# Patient Record
Sex: FEMALE | Race: WHITE | NOT HISPANIC OR LATINO | Employment: OTHER | URBAN - METROPOLITAN AREA
[De-identification: names, ages, dates, MRNs, and addresses within clinical notes are randomized per-mention and may not be internally consistent; named-entity substitution may affect disease eponyms.]

---

## 2024-02-03 NOTE — PROGRESS NOTES
Subjective   Sue Carter is a 73 y.o. female who presents for new patient. CAA-RI  HPI  73-year-old female with recommended patient.  Cerebral Amyloid angiopathy with related inflammation Denies  completed steroid therapy was seen at [facility]  Dr Mendez at [facility] ,concerned one episode of chest pain one episodes lasting one hour and gone,  no new episodes reported, patient walks daily up to 30 min without issues shortness of breath fever chill nausea vomiting constipation diarrhea dysuria urgency frequency. Patient Vitamin d calcium.     Mother angina 85 passed CVA  Father MI age 53 ,survived   age 78 Smoker   from Lung cancer  2 brothers 1 passed age 80 Darby disease pacemaker  Brother 79 alive MI stents     Review of Systems  10 system review pertinent as above  Objective     There were no vitals taken for this visit.   Physical Exam  HEENT: Atraumatic normocephalic the pupils are equal and round and reactive to light the sclerae nonicteric extraocular motion are intact.  Neck: Is supple without JVD no carotid bruits the trachea is midline there are no masses pulses are equal and bilateral with normal upstroke.  Skin: Normal.  Skin good texture.  Moist.  Good turgor.  No lesions, no rashes.  Lymph: No lymphadenopathy appreciated, no masses, no lesions  Lungs: Are clear to auscultation and percussion, good breath sounds bilaterally, no rhonchi, no wheezing, good diaphragmatic excursion.  Heart: Normal rate and normal rhythm S1, S2, no S3, no gallop, murmur or rub.  Abdomen: Soft, nontender, no organomegaly, good bowel sounds.    Extremities: Full range of motion, good pulses bilateral.  No cyanosis, no clubbing or edema.  Neuro: Cranial nerves II-XII are grossly intact there is no sensory or motor deficits.  Able to move all extremities.      Assessment/Plan     New to me patient    Significant medical risks include but not limited to blood work and radiology report  Pressure reviewed extensive notes from the  [facility]  Consultation recommendations    Cerebral amyloid angiopathy  With related inflammation  Diagnosed incidentally in 2020 December  Follows with Dr. Mendez [facility]  Completed steroids  Patient is instructed to follow regularly    History of  Fasting blood work CBC BMP lipids AST ALT evaluate 25-hydroxy    Prevention  Colonoscopy 05/05/2022  Mammogram 08/24/2022  Bone density 2021[facility]    EKG 2019 NSR Nl EKG    CACS score ordered  In setting of Fhx CAD  Father age 53 Se Fhx above    Cholesterol 2022 October 173, LDL 97    Immunizations  Flu vaccine 12/2023  Pneumonia vaccine 05/2016   Shingles vaccine declined  Corona vaccine 2/2 and 3 boosters  RSV declined      Hx DVT 2020  Yoana filter  Will follow with [facility]  Problem List Items Addressed This Visit       Autoimmune disorder (CMS/HCC)    Relevant Orders    Basic Metabolic Panel    Cerebral amyloid angiopathy (CODE) - Primary    Relevant Orders    CBC     Other Visit Diagnoses       Dyslipidemia        Relevant Orders    Basic Metabolic Panel    Lipid Panel    AST    ALT    CT cardiac scoring wo IV contrast    Atypical chest pain        Relevant Orders    Basic Metabolic Panel    ECG 12 lead (Clinic Performed) (Completed)    Vitamin D insufficiency        Relevant Orders    Vitamin D 25-Hydroxy,Total (for eval of Vitamin D levels)    Family history of early CAD        Relevant Orders    CT cardiac scoring wo IV contrast    Encounter for screening mammogram for malignant neoplasm of breast        Relevant Orders    BI mammo bilateral screening tomosynthesis              Yuval Gonzalez MD

## 2024-02-07 ENCOUNTER — OFFICE VISIT (OUTPATIENT)
Dept: PRIMARY CARE | Facility: CLINIC | Age: 74
End: 2024-02-07
Payer: MEDICARE

## 2024-02-07 VITALS
BODY MASS INDEX: 24.11 KG/M2 | HEIGHT: 66 IN | SYSTOLIC BLOOD PRESSURE: 124 MMHG | TEMPERATURE: 97.5 F | DIASTOLIC BLOOD PRESSURE: 82 MMHG | RESPIRATION RATE: 16 BRPM | HEART RATE: 78 BPM | WEIGHT: 150 LBS

## 2024-02-07 DIAGNOSIS — E78.5 DYSLIPIDEMIA: ICD-10-CM

## 2024-02-07 DIAGNOSIS — I68.0 CEREBRAL AMYLOID ANGIOPATHY (CODE): Primary | ICD-10-CM

## 2024-02-07 DIAGNOSIS — Z12.31 ENCOUNTER FOR SCREENING MAMMOGRAM FOR MALIGNANT NEOPLASM OF BREAST: ICD-10-CM

## 2024-02-07 DIAGNOSIS — D89.89 AUTOIMMUNE DISORDER (MULTI): ICD-10-CM

## 2024-02-07 DIAGNOSIS — R07.89 ATYPICAL CHEST PAIN: ICD-10-CM

## 2024-02-07 DIAGNOSIS — Z82.49 FAMILY HISTORY OF EARLY CAD: ICD-10-CM

## 2024-02-07 DIAGNOSIS — E55.9 VITAMIN D INSUFFICIENCY: ICD-10-CM

## 2024-02-07 PROBLEM — M81.0 AGE-RELATED OSTEOPOROSIS WITHOUT CURRENT PATHOLOGICAL FRACTURE: Status: ACTIVE | Noted: 2021-10-29

## 2024-02-07 PROBLEM — H04.123 DRY EYE SYNDROME OF BOTH EYES: Status: ACTIVE | Noted: 2023-12-01

## 2024-02-07 PROBLEM — I82.402 ACUTE DEEP VEIN THROMBOSIS (DVT) OF LEFT LOWER EXTREMITY (MULTI): Status: ACTIVE | Noted: 2020-07-17

## 2024-02-07 PROBLEM — H35.3130 BILATERAL DRY AGE-RELATED MACULAR DEGENERATION: Status: ACTIVE | Noted: 2023-11-29

## 2024-02-07 PROBLEM — H35.361 MACULAR DRUSEN, RIGHT: Status: ACTIVE | Noted: 2023-11-29

## 2024-02-07 PROCEDURE — 82306 VITAMIN D 25 HYDROXY: CPT | Performed by: INTERNAL MEDICINE

## 2024-02-07 PROCEDURE — 80048 BASIC METABOLIC PNL TOTAL CA: CPT | Performed by: INTERNAL MEDICINE

## 2024-02-07 PROCEDURE — 1159F MED LIST DOCD IN RCRD: CPT | Performed by: INTERNAL MEDICINE

## 2024-02-07 PROCEDURE — 85025 COMPLETE CBC W/AUTO DIFF WBC: CPT | Performed by: INTERNAL MEDICINE

## 2024-02-07 PROCEDURE — 84450 TRANSFERASE (AST) (SGOT): CPT | Performed by: INTERNAL MEDICINE

## 2024-02-07 PROCEDURE — 84460 ALANINE AMINO (ALT) (SGPT): CPT | Performed by: INTERNAL MEDICINE

## 2024-02-07 PROCEDURE — 80061 LIPID PANEL: CPT | Performed by: INTERNAL MEDICINE

## 2024-02-07 PROCEDURE — 1126F AMNT PAIN NOTED NONE PRSNT: CPT | Performed by: INTERNAL MEDICINE

## 2024-02-07 PROCEDURE — 93000 ELECTROCARDIOGRAM COMPLETE: CPT | Performed by: INTERNAL MEDICINE

## 2024-02-07 PROCEDURE — 1036F TOBACCO NON-USER: CPT | Performed by: INTERNAL MEDICINE

## 2024-02-07 PROCEDURE — 99215 OFFICE O/P EST HI 40 MIN: CPT | Performed by: INTERNAL MEDICINE

## 2024-02-07 ASSESSMENT — PAIN SCALES - GENERAL: PAINLEVEL: 0-NO PAIN

## 2024-02-07 ASSESSMENT — ENCOUNTER SYMPTOMS
OCCASIONAL FEELINGS OF UNSTEADINESS: 0
DEPRESSION: 0
LOSS OF SENSATION IN FEET: 0

## 2024-02-08 ENCOUNTER — HOSPITAL ENCOUNTER (OUTPATIENT)
Dept: RADIOLOGY | Facility: CLINIC | Age: 74
Discharge: HOME | End: 2024-02-08
Payer: MEDICARE

## 2024-02-08 DIAGNOSIS — Z12.31 ENCOUNTER FOR SCREENING MAMMOGRAM FOR MALIGNANT NEOPLASM OF BREAST: ICD-10-CM

## 2024-02-08 PROCEDURE — 77063 BREAST TOMOSYNTHESIS BI: CPT | Performed by: STUDENT IN AN ORGANIZED HEALTH CARE EDUCATION/TRAINING PROGRAM

## 2024-02-08 PROCEDURE — 77067 SCR MAMMO BI INCL CAD: CPT | Performed by: STUDENT IN AN ORGANIZED HEALTH CARE EDUCATION/TRAINING PROGRAM

## 2024-02-08 PROCEDURE — 77067 SCR MAMMO BI INCL CAD: CPT

## 2024-02-09 ENCOUNTER — HOSPITAL ENCOUNTER (OUTPATIENT)
Dept: RADIOLOGY | Facility: EXTERNAL LOCATION | Age: 74
Discharge: HOME | End: 2024-02-09

## 2024-03-29 ENCOUNTER — HOSPITAL ENCOUNTER (OUTPATIENT)
Dept: RADIOLOGY | Facility: HOSPITAL | Age: 74
Discharge: HOME | End: 2024-03-29
Payer: MEDICARE

## 2024-03-29 DIAGNOSIS — Z82.49 FAMILY HISTORY OF EARLY CAD: ICD-10-CM

## 2024-03-29 DIAGNOSIS — E78.5 DYSLIPIDEMIA: ICD-10-CM

## 2024-03-29 PROCEDURE — 75571 CT HRT W/O DYE W/CA TEST: CPT

## 2024-05-02 NOTE — PROGRESS NOTES
Subjective   uSe Carter is a 73 y.o. female who presents for here for follow-up.  hospitals  73-year-old female with recommended patient.  Cerebral Amyloid angiopathy with related inflammation   completed steroid therapy follows at [facility]  Dr Mendez at [facility] ,concerned one episode of chest pain one episodes lasting one hour and gone,  no new episodes reported, patient is active with no major medical denies chest pain shortness of breath fever chill nausea vomiting constipation diarrhea dysuria urgency frequency.  Patient was seen for physical exam and blood work in February 7, 2024      Review of Systems  10 system review pertinent as above  Objective     Visit Vitals  /82   Pulse 78   Temp 36.4 °C (97.5 °F)   Resp 15      Physical Exam  HEENT: Atraumatic normocephalic the pupils are equal and round and reactive to light the sclerae nonicteric extraocular motion are intact.  Neck: Is supple without JVD no carotid bruits the trachea is midline there are no masses pulses are equal and bilateral with normal upstroke.  Skin: Normal.  Skin good texture.  Moist.  Good turgor.  No lesions, no rashes.  Lymph: No lymphadenopathy appreciated, no masses, no lesions  Lungs: Are clear to auscultation and percussion, good breath sounds bilaterally, no rhonchi, no wheezing, good diaphragmatic excursion.  Heart: Normal rate and normal rhythm S1, S2, no S3, no gallop, murmur or rub.  Abdomen: Soft, nontender, no organomegaly, good bowel sounds.    Extremities: Full range of motion, good pulses bilateral.  No cyanosis, no clubbing or edema.  Neuro: Cranial nerves II-XII are grossly intact there is no sensory or motor deficits.  Able to move all extremities.      Assessment/Plan         Last blood works February 7, 2024    History of cerebral amyloid angiopathy  With related inflammation  Diagnosed incidentally in 2020 December  Follows with Dr. Mendez [facility]  Completed steroids SP repeat MRI 05/05/2024  Will review at  with   Andrea  Patient is instructed to follow regularly    History of  Fasting blood work BMP    Prevention  Colonoscopy 05/05/2022  Mammogram 02/07/2024  Bone density 2021[facility]    EKG 2019 NSR Nl EKG    CACS score =47 LAD low risk LDL 90 Mg/Dl 02/07/24  In setting of Fhx CAD  Father age 53 Se Fhx above    Cholesterol 2022 October 173, LDL 97    Immunizations  Flu vaccine 12/2023  Pneumonia vaccine 05/2016   Shingles vaccine declined  Corona vaccine 2/2 and 3 boosters  RSV declined      Hx DVT 2020  Yoana filter  Was no removed  Will follow with [facility] Dr GÓMEZ Mccoy  Problem List Items Addressed This Visit    None          Yuval Gonzalez MD

## 2024-05-08 ENCOUNTER — OFFICE VISIT (OUTPATIENT)
Dept: PRIMARY CARE | Facility: CLINIC | Age: 74
End: 2024-05-08
Payer: MEDICARE

## 2024-05-08 VITALS
SYSTOLIC BLOOD PRESSURE: 126 MMHG | TEMPERATURE: 97.5 F | RESPIRATION RATE: 15 BRPM | WEIGHT: 150 LBS | HEIGHT: 66 IN | BODY MASS INDEX: 24.11 KG/M2 | DIASTOLIC BLOOD PRESSURE: 82 MMHG | HEART RATE: 78 BPM

## 2024-05-08 DIAGNOSIS — I68.0 CEREBRAL AMYLOID ANGIOPATHY (CODE): ICD-10-CM

## 2024-05-08 DIAGNOSIS — I82.4Y2 ACUTE DEEP VEIN THROMBOSIS (DVT) OF PROXIMAL VEIN OF LEFT LOWER EXTREMITY (MULTI): Primary | ICD-10-CM

## 2024-05-08 DIAGNOSIS — M81.0 AGE-RELATED OSTEOPOROSIS WITHOUT CURRENT PATHOLOGICAL FRACTURE: ICD-10-CM

## 2024-05-08 DIAGNOSIS — H04.123 DRY EYE SYNDROME OF BOTH EYES: ICD-10-CM

## 2024-05-08 PROCEDURE — 1036F TOBACCO NON-USER: CPT | Performed by: INTERNAL MEDICINE

## 2024-05-08 PROCEDURE — 1170F FXNL STATUS ASSESSED: CPT | Performed by: INTERNAL MEDICINE

## 2024-05-08 PROCEDURE — 1159F MED LIST DOCD IN RCRD: CPT | Performed by: INTERNAL MEDICINE

## 2024-05-08 PROCEDURE — 99214 OFFICE O/P EST MOD 30 MIN: CPT | Performed by: INTERNAL MEDICINE

## 2024-05-08 PROCEDURE — G0438 PPPS, INITIAL VISIT: HCPCS | Performed by: INTERNAL MEDICINE

## 2024-05-08 PROCEDURE — 1126F AMNT PAIN NOTED NONE PRSNT: CPT | Performed by: INTERNAL MEDICINE

## 2024-05-08 ASSESSMENT — ACTIVITIES OF DAILY LIVING (ADL)
JUDGMENT_ADEQUATE_SAFELY_COMPLETE_DAILY_ACTIVITIES: YES
USING TELEPHONE: INDEPENDENT
HEARING - RIGHT EAR: FUNCTIONAL
HEARING - LEFT EAR: FUNCTIONAL
DOING HOUSEWORK: INDEPENDENT
EATING: INDEPENDENT
NEEDS ASSISTANCE WITH FOOD: INDEPENDENT
PILL BOX USED: NO
WALKS IN HOME: INDEPENDENT
DRESSING YOURSELF: INDEPENDENT
FEEDING YOURSELF: INDEPENDENT
PREPARING MEALS: INDEPENDENT
TOILETING: INDEPENDENT
MANAGING FINANCES: INDEPENDENT
BATHING: INDEPENDENT
TAKING MEDICATION: INDEPENDENT
USING TRANSPORTATION: INDEPENDENT
ADEQUATE_TO_COMPLETE_ADL: YES
GROCERY SHOPPING: INDEPENDENT
PATIENT'S MEMORY ADEQUATE TO SAFELY COMPLETE DAILY ACTIVITIES?: YES
GROOMING: INDEPENDENT

## 2024-05-08 ASSESSMENT — ANXIETY QUESTIONNAIRES
4. TROUBLE RELAXING: SEVERAL DAYS
3. WORRYING TOO MUCH ABOUT DIFFERENT THINGS: NOT AT ALL
2. NOT BEING ABLE TO STOP OR CONTROL WORRYING: SEVERAL DAYS
1. FEELING NERVOUS, ANXIOUS, OR ON EDGE: NOT AT ALL
6. BECOMING EASILY ANNOYED OR IRRITABLE: NOT AT ALL
IF YOU CHECKED OFF ANY PROBLEMS ON THIS QUESTIONNAIRE, HOW DIFFICULT HAVE THESE PROBLEMS MADE IT FOR YOU TO DO YOUR WORK, TAKE CARE OF THINGS AT HOME, OR GET ALONG WITH OTHER PEOPLE: NOT DIFFICULT AT ALL
7. FEELING AFRAID AS IF SOMETHING AWFUL MIGHT HAPPEN: NOT AT ALL

## 2024-05-08 ASSESSMENT — COLUMBIA-SUICIDE SEVERITY RATING SCALE - C-SSRS
6. HAVE YOU EVER DONE ANYTHING, STARTED TO DO ANYTHING, OR PREPARED TO DO ANYTHING TO END YOUR LIFE?: NO
2. HAVE YOU ACTUALLY HAD ANY THOUGHTS OF KILLING YOURSELF?: NO
1. IN THE PAST MONTH, HAVE YOU WISHED YOU WERE DEAD OR WISHED YOU COULD GO TO SLEEP AND NOT WAKE UP?: NO

## 2024-05-08 ASSESSMENT — GERIATRIC MINI NUTRITIONAL ASSESSMENT (MNA)
A HAS FOOD INTAKE DECLINED OVER THE PAST 3 MONTHS DUE TO LOSS OF APPETITE, DIGESTIVE PROBLEMS, CHEWING OR SWALLOWING DIFFICULTIES?: NO DECREASE IN FOOD INTAKE
C GENERAL MOBILITY: GOES OUT
B WEIGHT LOSS DURING THE LAST 3 MONTHS: NO WEIGHT LOSS
E NEUROPSYCHOLOGICAL PROBLEMS: NO PSYCHOLOGICAL PROBLEMS
D HAS SUFFERED PSYCHOLOGICAL STRESS OR ACUTE DISEASE IN THE PAST 3 MONTHS?: NO

## 2024-05-08 ASSESSMENT — ENCOUNTER SYMPTOMS
LOSS OF SENSATION IN FEET: 0
OCCASIONAL FEELINGS OF UNSTEADINESS: 0
DEPRESSION: 0

## 2024-05-08 ASSESSMENT — PATIENT HEALTH QUESTIONNAIRE - PHQ9
2. FEELING DOWN, DEPRESSED OR HOPELESS: NOT AT ALL
SUM OF ALL RESPONSES TO PHQ9 QUESTIONS 1 AND 2: 0
1. LITTLE INTEREST OR PLEASURE IN DOING THINGS: NOT AT ALL

## 2024-05-08 ASSESSMENT — PAIN SCALES - GENERAL: PAINLEVEL: 0-NO PAIN

## 2024-05-08 NOTE — PROGRESS NOTES
"Subjective   Reason for Visit: Sue Carter is an 73 y.o. female here for a Medicare Wellness visit.   Reviewed all medications by prescribing practitioner or clinical pharmacist (such as prescriptions, OTCs, herbal therapies and supplements) and documented in the medical record.    HPI  73-year-old female here for Medicare wellness with no major medical denies chest pain shortness of breath fever chill nausea vomiting constipation diarrhea dysuria urgency frequency.  She did not lives at home she is self-sufficient no history of falling  Patient Care Team:  Yuval Gonzalez MD as PCP - General (Internal Medicine)     Review of Systems  10 system are pertinent as above  Objective   Vitals:  /82   Pulse 78   Temp 36.4 °C (97.5 °F)   Resp 15   Ht 1.676 m (5' 6\")   Wt 68 kg (150 lb)   BMI 24.21 kg/m²       Physical Exam  HEENT: Atraumatic normocephalic the pupils are equal and round and reactive to light the sclerae nonicteric extraocular motion are intact.  Neck: Is supple without JVD no carotid bruits the trachea is midline there are no masses pulses are equal and bilateral with normal upstroke.  Skin: Normal.  Skin good texture.  Moist.  Good turgor.  No lesions, no rashes.  Lymph: No lymphadenopathy appreciated, no masses, no lesions  Lungs: Are clear to auscultation and percussion, good breath sounds bilaterally, no rhonchi, no wheezing, good diaphragmatic excursion.  Heart: Normal rate and normal rhythm S1, S2, no S3, no gallop, murmur or rub.  Abdomen: Soft, nontender, no organomegaly, good bowel sounds.    Extremities: Full range of motion, good pulses bilateral.  No cyanosis, no clubbing or edema.  Neuro: Cranial nerves II-XII are grossly intact there is no sensory or motor deficits.  Able to move all extremities.  Assessment/Plan   Medicare wellness  Problem List Items Addressed This Visit       Acute deep vein thrombosis (DVT) of left lower extremity (Multi) - Primary    Overview     Last Assessment & " Plan: Formatting of this note might be different from the original. -see LE u/s above -absolute contraindication to anticoagulation 2/2 recent subarachnoid hemmorage -vascular consulted for IVC filter -NPO for IVC filter today -continue to monitor         Age-related osteoporosis without current pathological fracture    Cerebral amyloid angiopathy (CODE)    Dry eye syndrome of both eyes

## 2024-10-01 ENCOUNTER — APPOINTMENT (OUTPATIENT)
Dept: PRIMARY CARE | Facility: CLINIC | Age: 74
End: 2024-10-01
Payer: MEDICARE

## 2024-10-29 ENCOUNTER — APPOINTMENT (OUTPATIENT)
Dept: PRIMARY CARE | Facility: CLINIC | Age: 74
End: 2024-10-29
Payer: MEDICARE

## 2024-10-29 VITALS
HEART RATE: 76 BPM | TEMPERATURE: 97.7 F | HEIGHT: 66 IN | WEIGHT: 153 LBS | BODY MASS INDEX: 24.59 KG/M2 | DIASTOLIC BLOOD PRESSURE: 80 MMHG | SYSTOLIC BLOOD PRESSURE: 120 MMHG

## 2024-10-29 DIAGNOSIS — I68.0 CEREBRAL AMYLOID ANGIOPATHY (CODE): Primary | ICD-10-CM

## 2024-10-29 DIAGNOSIS — M81.0 AGE-RELATED OSTEOPOROSIS WITHOUT CURRENT PATHOLOGICAL FRACTURE: ICD-10-CM

## 2024-10-29 DIAGNOSIS — H35.3130 BILATERAL NONEXUDATIVE AGE-RELATED MACULAR DEGENERATION, UNSPECIFIED STAGE: ICD-10-CM

## 2024-10-29 PROCEDURE — 1158F ADVNC CARE PLAN TLK DOCD: CPT | Performed by: INTERNAL MEDICINE

## 2024-10-29 PROCEDURE — 1159F MED LIST DOCD IN RCRD: CPT | Performed by: INTERNAL MEDICINE

## 2024-10-29 PROCEDURE — 1123F ACP DISCUSS/DSCN MKR DOCD: CPT | Performed by: INTERNAL MEDICINE

## 2024-10-29 PROCEDURE — 99214 OFFICE O/P EST MOD 30 MIN: CPT | Performed by: INTERNAL MEDICINE

## 2024-10-29 PROCEDURE — 1126F AMNT PAIN NOTED NONE PRSNT: CPT | Performed by: INTERNAL MEDICINE

## 2024-10-29 ASSESSMENT — ENCOUNTER SYMPTOMS
LOSS OF SENSATION IN FEET: 0
OCCASIONAL FEELINGS OF UNSTEADINESS: 0
DEPRESSION: 0

## 2024-10-29 ASSESSMENT — PAIN SCALES - GENERAL: PAINLEVEL_OUTOF10: 0-NO PAIN

## 2024-10-31 ENCOUNTER — CLINICAL SUPPORT (OUTPATIENT)
Dept: PRIMARY CARE | Facility: CLINIC | Age: 74
End: 2024-10-31
Payer: MEDICARE

## 2024-10-31 DIAGNOSIS — Z23 NEED FOR PNEUMOCOCCAL VACCINATION: ICD-10-CM

## 2024-10-31 PROCEDURE — G0009 ADMIN PNEUMOCOCCAL VACCINE: HCPCS | Performed by: INTERNAL MEDICINE

## 2024-10-31 PROCEDURE — 90677 PCV20 VACCINE IM: CPT | Performed by: INTERNAL MEDICINE

## 2024-12-10 ENCOUNTER — APPOINTMENT (OUTPATIENT)
Dept: RADIOLOGY | Facility: HOSPITAL | Age: 74
End: 2024-12-10
Payer: MEDICARE

## 2024-12-10 ENCOUNTER — APPOINTMENT (OUTPATIENT)
Dept: CARDIOLOGY | Facility: HOSPITAL | Age: 74
End: 2024-12-10
Payer: MEDICARE

## 2024-12-10 ENCOUNTER — HOSPITAL ENCOUNTER (INPATIENT)
Facility: HOSPITAL | Age: 74
LOS: 3 days | Discharge: HOME HEALTH | End: 2024-12-14
Attending: EMERGENCY MEDICINE | Admitting: SURGERY
Payer: MEDICARE

## 2024-12-10 ENCOUNTER — HOSPITAL ENCOUNTER (EMERGENCY)
Facility: HOSPITAL | Age: 74
Discharge: OTHER INSTITUTION | End: 2024-12-10
Attending: INTERNAL MEDICINE
Payer: MEDICARE

## 2024-12-10 VITALS
OXYGEN SATURATION: 96 % | TEMPERATURE: 98.7 F | SYSTOLIC BLOOD PRESSURE: 122 MMHG | WEIGHT: 150 LBS | RESPIRATION RATE: 16 BRPM | BODY MASS INDEX: 24.11 KG/M2 | DIASTOLIC BLOOD PRESSURE: 64 MMHG | HEIGHT: 66 IN | HEART RATE: 72 BPM

## 2024-12-10 DIAGNOSIS — S32.401A CLOSED DISPLACED FRACTURE OF RIGHT ACETABULUM, UNSPECIFIED PORTION OF ACETABULUM, INITIAL ENCOUNTER (MULTI): Primary | ICD-10-CM

## 2024-12-10 DIAGNOSIS — S32.431A: Primary | ICD-10-CM

## 2024-12-10 LAB
ABO GROUP (TYPE) IN BLOOD: NORMAL
ALBUMIN SERPL BCP-MCNC: 3.6 G/DL (ref 3.4–5)
ALP SERPL-CCNC: 69 U/L (ref 33–136)
ALT SERPL W P-5'-P-CCNC: 17 U/L (ref 7–45)
ANION GAP SERPL CALC-SCNC: 13 MMOL/L (ref 10–20)
ANTIBODY SCREEN: NORMAL
APTT PPP: 28 SECONDS (ref 27–38)
AST SERPL W P-5'-P-CCNC: 25 U/L (ref 9–39)
BASOPHILS # BLD AUTO: 0.04 X10*3/UL (ref 0–0.1)
BASOPHILS NFR BLD AUTO: 0.2 %
BILIRUB SERPL-MCNC: 0.5 MG/DL (ref 0–1.2)
BUN SERPL-MCNC: 16 MG/DL (ref 6–23)
CALCIUM SERPL-MCNC: 8.7 MG/DL (ref 8.6–10.3)
CHLORIDE SERPL-SCNC: 103 MMOL/L (ref 98–107)
CO2 SERPL-SCNC: 25 MMOL/L (ref 21–32)
CREAT SERPL-MCNC: 0.73 MG/DL (ref 0.5–1.05)
EGFRCR SERPLBLD CKD-EPI 2021: 86 ML/MIN/1.73M*2
EOSINOPHIL # BLD AUTO: 0.04 X10*3/UL (ref 0–0.4)
EOSINOPHIL NFR BLD AUTO: 0.2 %
ERYTHROCYTE [DISTWIDTH] IN BLOOD BY AUTOMATED COUNT: 12.7 % (ref 11.5–14.5)
GLUCOSE SERPL-MCNC: 98 MG/DL (ref 74–99)
HCT VFR BLD AUTO: 38.8 % (ref 36–46)
HGB BLD-MCNC: 13.2 G/DL (ref 12–16)
IMM GRANULOCYTES # BLD AUTO: 0.06 X10*3/UL (ref 0–0.5)
IMM GRANULOCYTES NFR BLD AUTO: 0.4 % (ref 0–0.9)
INR PPP: 1.1 (ref 0.9–1.1)
LYMPHOCYTES # BLD AUTO: 1.46 X10*3/UL (ref 0.8–3)
LYMPHOCYTES NFR BLD AUTO: 9 %
MCH RBC QN AUTO: 30.8 PG (ref 26–34)
MCHC RBC AUTO-ENTMCNC: 34 G/DL (ref 32–36)
MCV RBC AUTO: 90 FL (ref 80–100)
MONOCYTES # BLD AUTO: 0.9 X10*3/UL (ref 0.05–0.8)
MONOCYTES NFR BLD AUTO: 5.6 %
NEUTROPHILS # BLD AUTO: 13.65 X10*3/UL (ref 1.6–5.5)
NEUTROPHILS NFR BLD AUTO: 84.6 %
NRBC BLD-RTO: 0 /100 WBCS (ref 0–0)
PLATELET # BLD AUTO: 159 X10*3/UL (ref 150–450)
POTASSIUM SERPL-SCNC: 3.8 MMOL/L (ref 3.5–5.3)
PROT SERPL-MCNC: 7 G/DL (ref 6.4–8.2)
PROTHROMBIN TIME: 12.1 SECONDS (ref 9.8–12.8)
RBC # BLD AUTO: 4.29 X10*6/UL (ref 4–5.2)
RH FACTOR (ANTIGEN D): NORMAL
SODIUM SERPL-SCNC: 137 MMOL/L (ref 136–145)
WBC # BLD AUTO: 16.2 X10*3/UL (ref 4.4–11.3)

## 2024-12-10 PROCEDURE — 80053 COMPREHEN METABOLIC PANEL: CPT | Performed by: NURSE PRACTITIONER

## 2024-12-10 PROCEDURE — 2500000004 HC RX 250 GENERAL PHARMACY W/ HCPCS (ALT 636 FOR OP/ED)

## 2024-12-10 PROCEDURE — 76377 3D RENDER W/INTRP POSTPROCES: CPT

## 2024-12-10 PROCEDURE — 83735 ASSAY OF MAGNESIUM: CPT

## 2024-12-10 PROCEDURE — 73090 X-RAY EXAM OF FOREARM: CPT | Mod: RIGHT SIDE | Performed by: RADIOLOGY

## 2024-12-10 PROCEDURE — 85025 COMPLETE CBC W/AUTO DIFF WBC: CPT | Performed by: NURSE PRACTITIONER

## 2024-12-10 PROCEDURE — 73502 X-RAY EXAM HIP UNI 2-3 VIEWS: CPT | Mod: RIGHT SIDE | Performed by: RADIOLOGY

## 2024-12-10 PROCEDURE — 86901 BLOOD TYPING SEROLOGIC RH(D): CPT

## 2024-12-10 PROCEDURE — G0390 TRAUMA RESPONS W/HOSP CRITI: HCPCS

## 2024-12-10 PROCEDURE — 70450 CT HEAD/BRAIN W/O DYE: CPT | Performed by: RADIOLOGY

## 2024-12-10 PROCEDURE — 36415 COLL VENOUS BLD VENIPUNCTURE: CPT | Performed by: NURSE PRACTITIONER

## 2024-12-10 PROCEDURE — 36415 COLL VENOUS BLD VENIPUNCTURE: CPT

## 2024-12-10 PROCEDURE — 96375 TX/PRO/DX INJ NEW DRUG ADDON: CPT

## 2024-12-10 PROCEDURE — 85610 PROTHROMBIN TIME: CPT | Performed by: NURSE PRACTITIONER

## 2024-12-10 PROCEDURE — 70450 CT HEAD/BRAIN W/O DYE: CPT

## 2024-12-10 PROCEDURE — 93005 ELECTROCARDIOGRAM TRACING: CPT

## 2024-12-10 PROCEDURE — 96374 THER/PROPH/DIAG INJ IV PUSH: CPT

## 2024-12-10 PROCEDURE — 73090 X-RAY EXAM OF FOREARM: CPT | Mod: RT

## 2024-12-10 PROCEDURE — 72192 CT PELVIS W/O DYE: CPT

## 2024-12-10 PROCEDURE — 85730 THROMBOPLASTIN TIME PARTIAL: CPT | Performed by: NURSE PRACTITIONER

## 2024-12-10 PROCEDURE — 73502 X-RAY EXAM HIP UNI 2-3 VIEWS: CPT | Mod: RT

## 2024-12-10 PROCEDURE — 86900 BLOOD TYPING SEROLOGIC ABO: CPT | Performed by: NURSE PRACTITIONER

## 2024-12-10 PROCEDURE — 99285 EMERGENCY DEPT VISIT HI MDM: CPT | Mod: 25 | Performed by: INTERNAL MEDICINE

## 2024-12-10 PROCEDURE — 73562 X-RAY EXAM OF KNEE 3: CPT | Mod: RT

## 2024-12-10 PROCEDURE — 99285 EMERGENCY DEPT VISIT HI MDM: CPT | Performed by: EMERGENCY MEDICINE

## 2024-12-10 PROCEDURE — 71046 X-RAY EXAM CHEST 2 VIEWS: CPT | Performed by: STUDENT IN AN ORGANIZED HEALTH CARE EDUCATION/TRAINING PROGRAM

## 2024-12-10 PROCEDURE — 71046 X-RAY EXAM CHEST 2 VIEWS: CPT

## 2024-12-10 PROCEDURE — 72125 CT NECK SPINE W/O DYE: CPT | Performed by: RADIOLOGY

## 2024-12-10 PROCEDURE — 73562 X-RAY EXAM OF KNEE 3: CPT | Mod: RIGHT SIDE | Performed by: RADIOLOGY

## 2024-12-10 PROCEDURE — 72125 CT NECK SPINE W/O DYE: CPT

## 2024-12-10 PROCEDURE — 93010 ELECTROCARDIOGRAM REPORT: CPT | Performed by: EMERGENCY MEDICINE

## 2024-12-10 RX ORDER — ONDANSETRON HYDROCHLORIDE 2 MG/ML
4 INJECTION, SOLUTION INTRAVENOUS ONCE
Status: COMPLETED | OUTPATIENT
Start: 2024-12-10 | End: 2024-12-10

## 2024-12-10 RX ORDER — ORPHENADRINE CITRATE 30 MG/ML
60 INJECTION INTRAMUSCULAR; INTRAVENOUS ONCE
Status: COMPLETED | OUTPATIENT
Start: 2024-12-10 | End: 2024-12-10

## 2024-12-10 RX ORDER — LIDOCAINE HYDROCHLORIDE AND EPINEPHRINE 5; 5 MG/ML; UG/ML
20 INJECTION, SOLUTION INFILTRATION; PERINEURAL ONCE
Status: DISCONTINUED | OUTPATIENT
Start: 2024-12-10 | End: 2024-12-11

## 2024-12-10 RX ORDER — HYDROMORPHONE HYDROCHLORIDE 1 MG/ML
0.5 INJECTION, SOLUTION INTRAMUSCULAR; INTRAVENOUS; SUBCUTANEOUS ONCE
Status: COMPLETED | OUTPATIENT
Start: 2024-12-10 | End: 2024-12-10

## 2024-12-10 ASSESSMENT — PAIN DESCRIPTION - LOCATION: LOCATION: HIP

## 2024-12-10 ASSESSMENT — COLUMBIA-SUICIDE SEVERITY RATING SCALE - C-SSRS
6. HAVE YOU EVER DONE ANYTHING, STARTED TO DO ANYTHING, OR PREPARED TO DO ANYTHING TO END YOUR LIFE?: NO
1. IN THE PAST MONTH, HAVE YOU WISHED YOU WERE DEAD OR WISHED YOU COULD GO TO SLEEP AND NOT WAKE UP?: NO
2. HAVE YOU ACTUALLY HAD ANY THOUGHTS OF KILLING YOURSELF?: NO

## 2024-12-10 ASSESSMENT — PAIN - FUNCTIONAL ASSESSMENT: PAIN_FUNCTIONAL_ASSESSMENT: 0-10

## 2024-12-10 ASSESSMENT — PAIN DESCRIPTION - PAIN TYPE: TYPE: ACUTE PAIN

## 2024-12-10 ASSESSMENT — PAIN DESCRIPTION - ORIENTATION: ORIENTATION: RIGHT

## 2024-12-10 ASSESSMENT — PAIN SCALES - GENERAL: PAINLEVEL_OUTOF10: 8

## 2024-12-10 NOTE — ED TRIAGE NOTES
Secondary to patient volumes and overcrowding, I performed a brief medical screening exam of the patient in triage, as the patient awaits space in the main ED.    History of Present Illness:  Sue Carter presents with   Chief Complaint   Patient presents with    Fall    Hip Pain   Right forearm pain, and right knee pain.    Physical Exam:  General - In no acute distress  Respiratory - Breathing comfortably  Cardiac - Normal S1, S2, no m/g/r  Neurologic - Moving all extremities  Abdomen -bowel sounds present, no CVAT, nontender    Medical Decision Making:  Patient will require further evaluation in the main ED.    Initial diagnostic tests were ordered from triage.      The patient demonstrates understanding that this initial evaluation is a brief medical screening exam and the expectation is that they await for space in the main ED to be further evaluated.  The patient understands that, if they leave prior to further evaluation in the main ED after this initial evaluation in triage, they are doing so under their own accord knowing that their evaluation/work-up is not yet complete. The patient also understands that any preliminary diagnostic results, including abnormalities, may not be shared with them, if they choose to leave prior to further evaluation in the main ED.

## 2024-12-10 NOTE — ED PROVIDER NOTES
"HPI     CC: Fall and Hip Pain     HPI: Sue Carter is a 74 y.o. female with a history of cerebral amyloid angiopathy, osteoporosis, DVT not on AC 2/2 subarachnoid hemorrhage (patient tells me no pmhx and no medications), presents with right arm and leg pain after a fall.  Patient states that she does not know why she fell, she was trying to put something down and her legs came out from under her causing her to land on her right hip.  She endorses pain throughout her right arm and knee but mainly to her right hip.  She has been unable to bear weight.  She does have an abrasion to the right forearm.  She thinks she just bruised her arm.  She denies any numbness/tingling anywhere.  She does not think she hit her head.    ROS: 10-point review of systems was performed and is otherwise negative except as noted in HPI.    Limitations to history: N/A    Independent Historians: N/A    External Records Reviewed: Outpatient notes in EMR    Past Medical History: Noncontributory except per HPI     Past Surgical History: Noncontributory except per HPI     Family History: Reviewed and noncontributory     Social History:  Denies tobacco. Denies ETOH. Denies illicit drugs.    Social Determinants Affecting Care: N/A    No Known Allergies    Home Meds: No current outpatient medications     Physical Exam     ED Triage Vitals [12/10/24 1619]   Temperature Heart Rate Respirations BP   37.1 °C (98.7 °F) 68 16 113/72      Pulse Ox Temp src Heart Rate Source Patient Position   95 % -- -- --      BP Location FiO2 (%)     -- --         Heart Rate:  [68]   Temperature:  [37.1 °C (98.7 °F)]   Respirations:  [16]   BP: (113)/(72)   Height:  [167.6 cm (5' 6\")]   Weight:  [68 kg (150 lb)]   Pulse Ox:  [95 %]      Physical Exam  Vitals and nursing note reviewed.     CONSTITUTIONAL: Well appearing, well nourished, in no acute distress.   HENMT: Head atraumatic. No facial or scalp TTP. Airway patent. Nasal mucosa clear. Mouth with normal mucosa, clear " oropharynx. Uvula midline. TMs clear bilaterally with no hemotympanum. Neck supple.    EYES: Clear bilaterally. No periorbital TTP.  Pupils equally round and reactive to light, extraocular movements grossly intact.    CARDIOVASCULAR: Normal rate, regular rhythm.  Heart sounds S1, S2.  No murmurs, rubs or gallops. Normal pulses. Capillary refill <2 sec.   RESPIRATORY: No increased work of breathing. Breath sounds clear and equal bilaterally.  GASTROINTESTINAL: Abdomen soft, non-distended, non-tender. No rebound, no guarding. Bowel sounds normal in all 4 quadrants. No palpable masses.   GENITOURINARY:  No CVA tenderness.  MUSCULOSKELETAL: Abrasion with overlying tenderness R forearm. TTP R knee, no obvious effusion.  TTP R proximal femur with resistance to any ROM.  No midline C/T/L TTP or stepoffs. No other muscle or joint deformity or TTP. SILT throughout. No edema.  2+ DP/PT/RP.  NEUROLOGICAL: GCS 15. Alert and oriented, no asymmetry, moving all extremities equally.  SKIN: Warm, dry and intact. No rash. No seatbelt sign, schroeder sign, raccoon eyes or other notable skin lesions except as noted above.   PSYCHIATRIC: Normal mood and affect.  HEME/LYMPH: No adenopathy or splenomegaly.    Diagnostic Results      ECG: ECGs read and interpreted by me. See ED Course, below, for interpretation.    Labs Reviewed   CBC WITH AUTO DIFFERENTIAL - Abnormal       Result Value    WBC 16.2 (*)     nRBC 0.0      RBC 4.29      Hemoglobin 13.2      Hematocrit 38.8      MCV 90      MCH 30.8      MCHC 34.0      RDW 12.7      Platelets 159      Neutrophils % 84.6      Immature Granulocytes %, Automated 0.4      Lymphocytes % 9.0      Monocytes % 5.6      Eosinophils % 0.2      Basophils % 0.2      Neutrophils Absolute 13.65 (*)     Immature Granulocytes Absolute, Automated 0.06      Lymphocytes Absolute 1.46      Monocytes Absolute 0.90 (*)     Eosinophils Absolute 0.04      Basophils Absolute 0.04     COMPREHENSIVE METABOLIC PANEL -  Normal    Glucose 98      Sodium 137      Potassium 3.8      Chloride 103      Bicarbonate 25      Anion Gap 13      Urea Nitrogen 16      Creatinine 0.73      eGFR 86      Calcium 8.7      Albumin 3.6      Alkaline Phosphatase 69      Total Protein 7.0      AST 25      Bilirubin, Total 0.5      ALT 17     PROTIME-INR - Normal    Protime 12.1      INR 1.1     APTT - Normal    aPTT 28      Narrative:     The APTT is no longer used for monitoring Unfractionated Heparin Therapy. For monitoring Heparin Therapy, use the Heparin Assay.   TYPE AND SCREEN    ABO TYPE A      Rh TYPE POS      ANTIBODY SCREEN NEG           CT cervical spine wo IV contrast   Final Result   CT HEAD:   1. No acute intracranial abnormality or calvarial fracture.   2. Right temporal lobe encephalomalacia.   3. Diffuse nonspecific white matter changes, likely sequela of   small-vessel ischemic disease.             CT CERVICAL SPINE:   1. No acute fracture or traumatic malalignment of the cervical spine.        MACRO:   None        Signed by: Tom Marinelli 12/10/2024 7:10 PM   Dictation workstation:   [id number]      CT head wo IV contrast   Final Result   CT HEAD:   1. No acute intracranial abnormality or calvarial fracture.   2. Right temporal lobe encephalomalacia.   3. Diffuse nonspecific white matter changes, likely sequela of   small-vessel ischemic disease.             CT CERVICAL SPINE:   1. No acute fracture or traumatic malalignment of the cervical spine.        MACRO:   None        Signed by: Tom Marinelli 12/10/2024 7:10 PM   Dictation workstation:   [id number]      CT pelvis wo IV contrast   Final Result   1. Posterior subluxation of the right femoral head with resultant   comminuted fracture of the posterior wall of the right acetabulum and   posterior column. There is also a mild impaction deformity of the   right femoral head.   2. Fat stranding and edema about the fracture sites, including about   the right sciatic nerve.  Correlate for clinical evidence of   neurovascular injury.        MACRO:   None        Signed by: Tom Marinelli 12/10/2024 7:24 PM   Dictation workstation:   [id number]      XR chest 2 views   Final Result   No acute cardiopulmonary process.        Emphysema.        MACRO:   None.        Signed by: Juan Andrews 12/10/2024 6:02 PM   Dictation workstation:   [id number]      XR hip right with pelvis when performed 2 or 3 views   Final Result   Findings compatible with a posterior wall of the acetabulum   comminuted fracture with mild displacement. Posterior subluxation of   the femoral head. No definite femoral fracture seen. Recommend CT of   the right hip for complete evaluation        MACRO:   None        Signed by: Adrián Herron 12/10/2024 5:19 PM   Dictation workstation:   [id number]      XR forearm right 2 views   Final Result   No acute abnormality in the right forearm             MACRO:   None        Signed by: Adrián Herron 12/10/2024 5:16 PM   Dictation workstation:   [id number]      XR knee right 3 views   Final Result   No acute abnormality in the right knee             MACRO:   None        Signed by: Adrián Herron 12/10/2024 5:17 PM   Dictation workstation:   [id number]                    Ariel Coma Scale Score: 15                  Procedure  Procedures    ED Course & MDM   Assessment/Plan:   Sue Carter is a 74 y.o. female with a history of cerebral amyloid angiopathy, osteoporosis, DVT not on AC 2/2 subarachnoid hemorrhage (patient tells me no pmhx and no medications), presents with right arm and leg pain after a fall.  She has an abrasion to her right heart forearm, tenderness to the right proximal femur and knee.  No other signs of trauma on exam.  She is neurovascularly intact throughout.  Workup was initiated by triage provider and is concerned for a right acetabular fracture with posterior subluxation of the femoral head.  CT of the pelvis was ordered.  X-rays of the knee,  forearm and chest are unremarkable.  I did speak with orthopedics resident who advises keeping patient recumbent and transferring downtown.  Transfer center order placed.  See below for details of ED course and ultimate disposition.    Medications - No data to display     ED Course as of 12/10/24 2015   Tue Dec 10, 2024   1920 Spoke with Dr. Benitez from [facility] who agrees patient should be evaluated.  [CG]   1921 Spoke with Dr. Echavarria who accepts patient in transfer to the ED. [CG]   1952 ECG read interpreted by me.  Sinus rhythm with marked sinus arrhythmia, rate 69.  Normal axis.  Normal intervals.  No ST or T wave derangements. [CG]   1952 Labs are notable for mild leukocytosis 16.2 otherwise negative labs [CG]   1952 CT pelvis 1. Posterior subluxation of the right femoral head with resultant comminuted fracture of the posterior wall of the right acetabulum and posterior column. There is also a mild impaction deformity of the right femoral head.  2. Fat stranding and edema about the fracture sites, including about the right sciatic nerve. Correlate for clinical evidence of neurovascular injury.   [CG]   1954 CTH/CS no traumatic injury. [CG]      ED Course User Index  [CG] Elen Chamberlain MD         Diagnoses as of 12/10/24 2015   Closed displaced fracture of anterior column of right acetabulum, initial encounter (Multi)       Disposition:   Transferred. The patient was [place] [facility] [place]. Risks/benefits of transfer discussed. EMTALA form completed. Patient was transferred without incident.     ED Prescriptions    None         Elen Chamberlain MD  EM/IM/Peds    This note was dictated by speech recognition. Minor errors in transcription may be present.     Elen Chamberlain MD  12/10/24 2015

## 2024-12-10 NOTE — ED TRIAGE NOTES
Pt to ED from home after slipping and falling today. Pt denies LOC, denies thinners and denies hitting her head. Pt states she was in her living room when she slipped and fell landing on her right hip. Pt endorses right hip pain, right knee pain and right arm pain. Pt states she is unable to bear weight on her hip.

## 2024-12-11 ENCOUNTER — APPOINTMENT (OUTPATIENT)
Dept: RADIOLOGY | Facility: HOSPITAL | Age: 74
End: 2024-12-11
Payer: MEDICARE

## 2024-12-11 ENCOUNTER — ANESTHESIA (OUTPATIENT)
Dept: OPERATING ROOM | Facility: HOSPITAL | Age: 74
End: 2024-12-11
Payer: MEDICARE

## 2024-12-11 ENCOUNTER — CLINICAL SUPPORT (OUTPATIENT)
Dept: EMERGENCY MEDICINE | Facility: HOSPITAL | Age: 74
End: 2024-12-11
Payer: MEDICARE

## 2024-12-11 ENCOUNTER — ANESTHESIA EVENT (OUTPATIENT)
Dept: OPERATING ROOM | Facility: HOSPITAL | Age: 74
End: 2024-12-11
Payer: MEDICARE

## 2024-12-11 PROBLEM — S32.401A: Status: ACTIVE | Noted: 2024-12-11

## 2024-12-11 LAB
ABO GROUP (TYPE) IN BLOOD: NORMAL
ABO GROUP (TYPE) IN BLOOD: NORMAL
ANTIBODY SCREEN: NORMAL
ATRIAL RATE: 63 BPM
ERYTHROCYTE [DISTWIDTH] IN BLOOD BY AUTOMATED COUNT: 12.7 % (ref 11.5–14.5)
HCT VFR BLD AUTO: 37.5 % (ref 36–46)
HGB BLD-MCNC: 12.1 G/DL (ref 12–16)
MAGNESIUM SERPL-MCNC: 1.74 MG/DL (ref 1.6–2.4)
MCH RBC QN AUTO: 30 PG (ref 26–34)
MCHC RBC AUTO-ENTMCNC: 32.3 G/DL (ref 32–36)
MCV RBC AUTO: 93 FL (ref 80–100)
NRBC BLD-RTO: 0 /100 WBCS (ref 0–0)
P AXIS: 37 DEGREES
P OFFSET: 202 MS
P ONSET: 152 MS
PLATELET # BLD AUTO: 179 X10*3/UL (ref 150–450)
PR INTERVAL: 136 MS
Q ONSET: 220 MS
QRS COUNT: 10 BEATS
QRS DURATION: 84 MS
QT INTERVAL: 428 MS
QTC CALCULATION(BAZETT): 437 MS
QTC FREDERICIA: 435 MS
R AXIS: 26 DEGREES
RBC # BLD AUTO: 4.04 X10*6/UL (ref 4–5.2)
RH FACTOR (ANTIGEN D): NORMAL
RH FACTOR (ANTIGEN D): NORMAL
T AXIS: 46 DEGREES
T OFFSET: 434 MS
VENTRICULAR RATE: 63 BPM
WBC # BLD AUTO: 17 X10*3/UL (ref 4.4–11.3)

## 2024-12-11 PROCEDURE — 99222 1ST HOSP IP/OBS MODERATE 55: CPT

## 2024-12-11 PROCEDURE — 3600000004 HC OR TIME - INITIAL BASE CHARGE - PROCEDURE LEVEL FOUR: Performed by: ORTHOPAEDIC SURGERY

## 2024-12-11 PROCEDURE — 2500000001 HC RX 250 WO HCPCS SELF ADMINISTERED DRUGS (ALT 637 FOR MEDICARE OP): Performed by: PHYSICIAN ASSISTANT

## 2024-12-11 PROCEDURE — 2500000004 HC RX 250 GENERAL PHARMACY W/ HCPCS (ALT 636 FOR OP/ED)

## 2024-12-11 PROCEDURE — 72190 X-RAY EXAM OF PELVIS: CPT

## 2024-12-11 PROCEDURE — 0QS404Z REPOSITION RIGHT ACETABULUM WITH INTERNAL FIXATION DEVICE, OPEN APPROACH: ICD-10-PCS | Performed by: ORTHOPAEDIC SURGERY

## 2024-12-11 PROCEDURE — 36415 COLL VENOUS BLD VENIPUNCTURE: CPT | Performed by: SURGERY

## 2024-12-11 PROCEDURE — 2780000003 HC OR 278 NO HCPCS: Performed by: ORTHOPAEDIC SURGERY

## 2024-12-11 PROCEDURE — 3700000001 HC GENERAL ANESTHESIA TIME - INITIAL BASE CHARGE: Performed by: ORTHOPAEDIC SURGERY

## 2024-12-11 PROCEDURE — 2500000004 HC RX 250 GENERAL PHARMACY W/ HCPCS (ALT 636 FOR OP/ED): Performed by: ORTHOPAEDIC SURGERY

## 2024-12-11 PROCEDURE — 51702 INSERT TEMP BLADDER CATH: CPT

## 2024-12-11 PROCEDURE — 2500000004 HC RX 250 GENERAL PHARMACY W/ HCPCS (ALT 636 FOR OP/ED): Mod: JZ

## 2024-12-11 PROCEDURE — 72170 X-RAY EXAM OF PELVIS: CPT | Performed by: RADIOLOGY

## 2024-12-11 PROCEDURE — 27228 TREAT HIP FRACTURE(S): CPT | Performed by: ORTHOPAEDIC SURGERY

## 2024-12-11 PROCEDURE — A6213 FOAM DRG >16<=48 SQ IN W/BDR: HCPCS | Performed by: ORTHOPAEDIC SURGERY

## 2024-12-11 PROCEDURE — 1200000002 HC GENERAL ROOM WITH TELEMETRY DAILY

## 2024-12-11 PROCEDURE — 2720000007 HC OR 272 NO HCPCS: Performed by: ORTHOPAEDIC SURGERY

## 2024-12-11 PROCEDURE — 3700000002 HC GENERAL ANESTHESIA TIME - EACH INCREMENTAL 1 MINUTE: Performed by: ORTHOPAEDIC SURGERY

## 2024-12-11 PROCEDURE — A27228 PR OPEN INTERN FIX COMPLEX ACETABUL FX: Performed by: ANESTHESIOLOGY

## 2024-12-11 PROCEDURE — 36415 COLL VENOUS BLD VENIPUNCTURE: CPT

## 2024-12-11 PROCEDURE — C1713 ANCHOR/SCREW BN/BN,TIS/BN: HCPCS | Performed by: ORTHOPAEDIC SURGERY

## 2024-12-11 PROCEDURE — 27299 UNLISTED PX PELVIS/HIP JOINT: CPT | Performed by: ORTHOPAEDIC SURGERY

## 2024-12-11 PROCEDURE — 27299 UNLISTED PX PELVIS/HIP JOINT: CPT | Performed by: STUDENT IN AN ORGANIZED HEALTH CARE EDUCATION/TRAINING PROGRAM

## 2024-12-11 PROCEDURE — 85027 COMPLETE CBC AUTOMATED: CPT | Performed by: SURGERY

## 2024-12-11 PROCEDURE — 2500000001 HC RX 250 WO HCPCS SELF ADMINISTERED DRUGS (ALT 637 FOR MEDICARE OP)

## 2024-12-11 PROCEDURE — 20670 REMOVAL IMPLANT SUPERFICIAL: CPT | Performed by: ORTHOPAEDIC SURGERY

## 2024-12-11 PROCEDURE — 2500000005 HC RX 250 GENERAL PHARMACY W/O HCPCS: Performed by: ANESTHESIOLOGY

## 2024-12-11 PROCEDURE — 7100000002 HC RECOVERY ROOM TIME - EACH INCREMENTAL 1 MINUTE: Performed by: ORTHOPAEDIC SURGERY

## 2024-12-11 PROCEDURE — 99100 ANES PT EXTEME AGE<1 YR&>70: CPT | Performed by: ANESTHESIOLOGY

## 2024-12-11 PROCEDURE — 3600000009 HC OR TIME - EACH INCREMENTAL 1 MINUTE - PROCEDURE LEVEL FOUR: Performed by: ORTHOPAEDIC SURGERY

## 2024-12-11 PROCEDURE — 2500000004 HC RX 250 GENERAL PHARMACY W/ HCPCS (ALT 636 FOR OP/ED): Performed by: ANESTHESIOLOGY

## 2024-12-11 PROCEDURE — 2500000005 HC RX 250 GENERAL PHARMACY W/O HCPCS

## 2024-12-11 PROCEDURE — 7100000001 HC RECOVERY ROOM TIME - INITIAL BASE CHARGE: Performed by: ORTHOPAEDIC SURGERY

## 2024-12-11 PROCEDURE — P9045 ALBUMIN (HUMAN), 5%, 250 ML: HCPCS | Mod: JZ

## 2024-12-11 PROCEDURE — 27228 TREAT HIP FRACTURE(S): CPT | Performed by: STUDENT IN AN ORGANIZED HEALTH CARE EDUCATION/TRAINING PROGRAM

## 2024-12-11 PROCEDURE — 72170 X-RAY EXAM OF PELVIS: CPT

## 2024-12-11 DEVICE — SCREW, CORTICAL, SELF-TAPPING, 3.5 X 38 MM, STAINLESS STEEL: Type: IMPLANTABLE DEVICE | Site: HIP | Status: FUNCTIONAL

## 2024-12-11 DEVICE — SCREW, CORTICAL, SELF-TAPPING, 3.5 X 22 MM, STAINLESS STEEL: Type: IMPLANTABLE DEVICE | Site: HIP | Status: FUNCTIONAL

## 2024-12-11 DEVICE — SCREW, CORTICAL, SELF-TAPPING, 3.5 X 32 MM, STAINLESS STEEL: Type: IMPLANTABLE DEVICE | Site: HIP | Status: FUNCTIONAL

## 2024-12-11 DEVICE — SCREW, CORTICAL, SELF-TAPPING, 3.5 X 50 MM, STAINLESS STEEL: Type: IMPLANTABLE DEVICE | Site: HIP | Status: FUNCTIONAL

## 2024-12-11 DEVICE — SCREW, CORTICAL, SELF-TAPPING, 3.5 X 30 MM, STAINLESS STEEL: Type: IMPLANTABLE DEVICE | Site: HIP | Status: FUNCTIONAL

## 2024-12-11 DEVICE — SCREW CORTEX 3.5 X 45: Type: IMPLANTABLE DEVICE | Site: HIP | Status: FUNCTIONAL

## 2024-12-11 DEVICE — IMPLANTABLE DEVICE: Type: IMPLANTABLE DEVICE | Site: HIP | Status: FUNCTIONAL

## 2024-12-11 DEVICE — BONE CHIPS,  CANCELL 30CC 1.7-10MM: Type: IMPLANTABLE DEVICE | Site: FLANK | Status: FUNCTIONAL

## 2024-12-11 DEVICE — SCREW, CORTICAL, SELF-TAPPING, 3.5 X 34 MM, STAINLESS STEEL: Type: IMPLANTABLE DEVICE | Site: HIP | Status: FUNCTIONAL

## 2024-12-11 DEVICE — SCREW, CORTICAL, SELF-TAPPING, 3.5 X 24 MM, STAINLESS STEEL: Type: IMPLANTABLE DEVICE | Site: HIP | Status: FUNCTIONAL

## 2024-12-11 DEVICE — SCREW, CORTICAL, SELF-TAPPING, 3.5 X 36 MM, STAINLESS STEEL: Type: IMPLANTABLE DEVICE | Site: HIP | Status: FUNCTIONAL

## 2024-12-11 RX ORDER — FENTANYL CITRATE 50 UG/ML
INJECTION, SOLUTION INTRAMUSCULAR; INTRAVENOUS AS NEEDED
Status: DISCONTINUED | OUTPATIENT
Start: 2024-12-11 | End: 2024-12-11

## 2024-12-11 RX ORDER — CEFAZOLIN SODIUM 2 G/100ML
2 INJECTION, SOLUTION INTRAVENOUS EVERY 8 HOURS
Status: COMPLETED | OUTPATIENT
Start: 2024-12-11 | End: 2024-12-12

## 2024-12-11 RX ORDER — TALC
3 POWDER (GRAM) TOPICAL NIGHTLY PRN
Status: DISCONTINUED | OUTPATIENT
Start: 2024-12-11 | End: 2024-12-14 | Stop reason: HOSPADM

## 2024-12-11 RX ORDER — LIDOCAINE HYDROCHLORIDE 10 MG/ML
0.1 INJECTION, SOLUTION INFILTRATION; PERINEURAL ONCE
Status: DISCONTINUED | OUTPATIENT
Start: 2024-12-11 | End: 2024-12-11 | Stop reason: HOSPADM

## 2024-12-11 RX ORDER — PHENYLEPHRINE HCL IN 0.9% NACL 0.4MG/10ML
SYRINGE (ML) INTRAVENOUS AS NEEDED
Status: DISCONTINUED | OUTPATIENT
Start: 2024-12-11 | End: 2024-12-11

## 2024-12-11 RX ORDER — ROCURONIUM BROMIDE 10 MG/ML
INJECTION, SOLUTION INTRAVENOUS AS NEEDED
Status: DISCONTINUED | OUTPATIENT
Start: 2024-12-11 | End: 2024-12-11

## 2024-12-11 RX ORDER — CEFAZOLIN 1 G/1
INJECTION, POWDER, FOR SOLUTION INTRAVENOUS AS NEEDED
Status: DISCONTINUED | OUTPATIENT
Start: 2024-12-11 | End: 2024-12-11

## 2024-12-11 RX ORDER — HYDRALAZINE HYDROCHLORIDE 20 MG/ML
5 INJECTION INTRAMUSCULAR; INTRAVENOUS EVERY 30 MIN PRN
Status: DISCONTINUED | OUTPATIENT
Start: 2024-12-11 | End: 2024-12-11 | Stop reason: HOSPADM

## 2024-12-11 RX ORDER — OXYCODONE HYDROCHLORIDE 5 MG/1
10 TABLET ORAL EVERY 4 HOURS PRN
Status: DISCONTINUED | OUTPATIENT
Start: 2024-12-11 | End: 2024-12-11 | Stop reason: HOSPADM

## 2024-12-11 RX ORDER — ONDANSETRON HYDROCHLORIDE 2 MG/ML
4 INJECTION, SOLUTION INTRAVENOUS EVERY 8 HOURS PRN
Status: DISCONTINUED | OUTPATIENT
Start: 2024-12-11 | End: 2024-12-11

## 2024-12-11 RX ORDER — ENOXAPARIN SODIUM 100 MG/ML
30 INJECTION SUBCUTANEOUS EVERY 12 HOURS
Status: DISCONTINUED | OUTPATIENT
Start: 2024-12-11 | End: 2024-12-12

## 2024-12-11 RX ORDER — TOBRAMYCIN 1.2 G/30ML
INJECTION, POWDER, LYOPHILIZED, FOR SOLUTION INTRAVENOUS AS NEEDED
Status: DISCONTINUED | OUTPATIENT
Start: 2024-12-11 | End: 2024-12-11 | Stop reason: HOSPADM

## 2024-12-11 RX ORDER — ESMOLOL HYDROCHLORIDE 10 MG/ML
INJECTION INTRAVENOUS AS NEEDED
Status: DISCONTINUED | OUTPATIENT
Start: 2024-12-11 | End: 2024-12-11

## 2024-12-11 RX ORDER — MULTIVIT-MIN/IRON FUM/FOLIC AC 7.5 MG-4
1 TABLET ORAL DAILY
COMMUNITY

## 2024-12-11 RX ORDER — METHOCARBAMOL 100 MG/ML
1000 INJECTION, SOLUTION INTRAMUSCULAR; INTRAVENOUS ONCE AS NEEDED
Status: DISCONTINUED | OUTPATIENT
Start: 2024-12-11 | End: 2024-12-11 | Stop reason: HOSPADM

## 2024-12-11 RX ORDER — HYDROMORPHONE HYDROCHLORIDE 1 MG/ML
INJECTION, SOLUTION INTRAMUSCULAR; INTRAVENOUS; SUBCUTANEOUS AS NEEDED
Status: DISCONTINUED | OUTPATIENT
Start: 2024-12-11 | End: 2024-12-11

## 2024-12-11 RX ORDER — LIDOCAINE HYDROCHLORIDE 10 MG/ML
20 INJECTION, SOLUTION EPIDURAL; INFILTRATION; INTRACAUDAL; PERINEURAL ONCE
Status: DISCONTINUED | OUTPATIENT
Start: 2024-12-11 | End: 2024-12-11

## 2024-12-11 RX ORDER — LABETALOL HYDROCHLORIDE 5 MG/ML
5 INJECTION, SOLUTION INTRAVENOUS ONCE AS NEEDED
Status: DISCONTINUED | OUTPATIENT
Start: 2024-12-11 | End: 2024-12-11 | Stop reason: HOSPADM

## 2024-12-11 RX ORDER — OXYCODONE HYDROCHLORIDE 5 MG/1
5 TABLET ORAL EVERY 4 HOURS PRN
Status: DISCONTINUED | OUTPATIENT
Start: 2024-12-11 | End: 2024-12-14 | Stop reason: HOSPADM

## 2024-12-11 RX ORDER — MIDAZOLAM HYDROCHLORIDE 1 MG/ML
INJECTION INTRAMUSCULAR; INTRAVENOUS AS NEEDED
Status: DISCONTINUED | OUTPATIENT
Start: 2024-12-11 | End: 2024-12-11

## 2024-12-11 RX ORDER — PROPOFOL 10 MG/ML
INJECTION, EMULSION INTRAVENOUS AS NEEDED
Status: DISCONTINUED | OUTPATIENT
Start: 2024-12-11 | End: 2024-12-11

## 2024-12-11 RX ORDER — LIDOCAINE HYDROCHLORIDE 10 MG/ML
INJECTION, SOLUTION EPIDURAL; INFILTRATION; INTRACAUDAL; PERINEURAL
Status: DISPENSED
Start: 2024-12-11 | End: 2024-12-11

## 2024-12-11 RX ORDER — SODIUM CHLORIDE, SODIUM LACTATE, POTASSIUM CHLORIDE, CALCIUM CHLORIDE 600; 310; 30; 20 MG/100ML; MG/100ML; MG/100ML; MG/100ML
INJECTION, SOLUTION INTRAVENOUS CONTINUOUS PRN
Status: DISCONTINUED | OUTPATIENT
Start: 2024-12-11 | End: 2024-12-11

## 2024-12-11 RX ORDER — LIDOCAINE HYDROCHLORIDE 20 MG/ML
INJECTION, SOLUTION INFILTRATION; PERINEURAL AS NEEDED
Status: DISCONTINUED | OUTPATIENT
Start: 2024-12-11 | End: 2024-12-11

## 2024-12-11 RX ORDER — ALBUTEROL SULFATE 0.83 MG/ML
2.5 SOLUTION RESPIRATORY (INHALATION) ONCE AS NEEDED
Status: DISCONTINUED | OUTPATIENT
Start: 2024-12-11 | End: 2024-12-11 | Stop reason: HOSPADM

## 2024-12-11 RX ORDER — ONDANSETRON 4 MG/1
4 TABLET, FILM COATED ORAL ONCE
Status: COMPLETED | OUTPATIENT
Start: 2024-12-11 | End: 2024-12-11

## 2024-12-11 RX ORDER — ONDANSETRON 4 MG/1
4 TABLET, FILM COATED ORAL EVERY 8 HOURS PRN
Status: DISCONTINUED | OUTPATIENT
Start: 2024-12-11 | End: 2024-12-11

## 2024-12-11 RX ORDER — POLYETHYLENE GLYCOL 3350 17 G/17G
17 POWDER, FOR SOLUTION ORAL DAILY
Status: DISCONTINUED | OUTPATIENT
Start: 2024-12-11 | End: 2024-12-13

## 2024-12-11 RX ORDER — VANCOMYCIN HYDROCHLORIDE 1 G/20ML
INJECTION, POWDER, LYOPHILIZED, FOR SOLUTION INTRAVENOUS AS NEEDED
Status: DISCONTINUED | OUTPATIENT
Start: 2024-12-11 | End: 2024-12-11 | Stop reason: HOSPADM

## 2024-12-11 RX ORDER — OXYCODONE HYDROCHLORIDE 5 MG/1
5 TABLET ORAL EVERY 4 HOURS PRN
Status: DISCONTINUED | OUTPATIENT
Start: 2024-12-11 | End: 2024-12-11 | Stop reason: HOSPADM

## 2024-12-11 RX ORDER — AMOXICILLIN 250 MG
2 CAPSULE ORAL 2 TIMES DAILY
Status: DISCONTINUED | OUTPATIENT
Start: 2024-12-11 | End: 2024-12-14 | Stop reason: HOSPADM

## 2024-12-11 RX ORDER — OXYCODONE HYDROCHLORIDE 5 MG/1
2.5 TABLET ORAL EVERY 4 HOURS PRN
Status: DISCONTINUED | OUTPATIENT
Start: 2024-12-11 | End: 2024-12-14 | Stop reason: HOSPADM

## 2024-12-11 RX ORDER — LIDOCAINE HYDROCHLORIDE 10 MG/ML
INJECTION, SOLUTION INFILTRATION; PERINEURAL
Status: COMPLETED
Start: 2024-12-11 | End: 2024-12-11

## 2024-12-11 RX ORDER — ONDANSETRON HYDROCHLORIDE 2 MG/ML
4 INJECTION, SOLUTION INTRAVENOUS ONCE
Status: COMPLETED | OUTPATIENT
Start: 2024-12-11 | End: 2024-12-11

## 2024-12-11 RX ORDER — HYDROMORPHONE HYDROCHLORIDE 0.2 MG/ML
0.2 INJECTION INTRAMUSCULAR; INTRAVENOUS; SUBCUTANEOUS EVERY 5 MIN PRN
Status: DISCONTINUED | OUTPATIENT
Start: 2024-12-11 | End: 2024-12-11 | Stop reason: HOSPADM

## 2024-12-11 RX ORDER — LIDOCAINE HYDROCHLORIDE 10 MG/ML
10 INJECTION, SOLUTION INFILTRATION; PERINEURAL ONCE
Status: COMPLETED | OUTPATIENT
Start: 2024-12-11 | End: 2024-12-11

## 2024-12-11 RX ORDER — ONDANSETRON HYDROCHLORIDE 2 MG/ML
4 INJECTION, SOLUTION INTRAVENOUS ONCE AS NEEDED
Status: DISCONTINUED | OUTPATIENT
Start: 2024-12-11 | End: 2024-12-11 | Stop reason: HOSPADM

## 2024-12-11 RX ORDER — ALBUMIN HUMAN 50 G/1000ML
SOLUTION INTRAVENOUS AS NEEDED
Status: DISCONTINUED | OUTPATIENT
Start: 2024-12-11 | End: 2024-12-11

## 2024-12-11 RX ORDER — ACETAMINOPHEN 325 MG/1
975 TABLET ORAL EVERY 8 HOURS SCHEDULED
Status: DISCONTINUED | OUTPATIENT
Start: 2024-12-11 | End: 2024-12-14 | Stop reason: HOSPADM

## 2024-12-11 RX ORDER — PSYLLIUM HUSK 0.4 G
1 CAPSULE ORAL 2 TIMES DAILY
Status: DISCONTINUED | OUTPATIENT
Start: 2024-12-11 | End: 2024-12-14 | Stop reason: HOSPADM

## 2024-12-11 SDOH — SOCIAL STABILITY: SOCIAL INSECURITY: WITHIN THE LAST YEAR, HAVE YOU BEEN HUMILIATED OR EMOTIONALLY ABUSED IN OTHER WAYS BY YOUR PARTNER OR EX-PARTNER?: NO

## 2024-12-11 SDOH — ECONOMIC STABILITY: FOOD INSECURITY: WITHIN THE PAST 12 MONTHS, THE FOOD YOU BOUGHT JUST DIDN'T LAST AND YOU DIDN'T HAVE MONEY TO GET MORE.: NEVER TRUE

## 2024-12-11 SDOH — SOCIAL STABILITY: SOCIAL INSECURITY: DOES ANYONE TRY TO KEEP YOU FROM HAVING/CONTACTING OTHER FRIENDS OR DOING THINGS OUTSIDE YOUR HOME?: NO

## 2024-12-11 SDOH — SOCIAL STABILITY: SOCIAL INSECURITY: WITHIN THE LAST YEAR, HAVE YOU BEEN AFRAID OF YOUR PARTNER OR EX-PARTNER?: NO

## 2024-12-11 SDOH — ECONOMIC STABILITY: HOUSING INSECURITY: IN THE LAST 12 MONTHS, WAS THERE A TIME WHEN YOU WERE NOT ABLE TO PAY THE MORTGAGE OR RENT ON TIME?: NO

## 2024-12-11 SDOH — SOCIAL STABILITY: SOCIAL INSECURITY: HAVE YOU HAD ANY THOUGHTS OF HARMING ANYONE ELSE?: NO

## 2024-12-11 SDOH — SOCIAL STABILITY: SOCIAL INSECURITY
WITHIN THE LAST YEAR, HAVE YOU BEEN RAPED OR FORCED TO HAVE ANY KIND OF SEXUAL ACTIVITY BY YOUR PARTNER OR EX-PARTNER?: NO

## 2024-12-11 SDOH — ECONOMIC STABILITY: HOUSING INSECURITY: AT ANY TIME IN THE PAST 12 MONTHS, WERE YOU HOMELESS OR LIVING IN A SHELTER (INCLUDING NOW)?: NO

## 2024-12-11 SDOH — ECONOMIC STABILITY: INCOME INSECURITY: IN THE PAST 12 MONTHS HAS THE ELECTRIC, GAS, OIL, OR WATER COMPANY THREATENED TO SHUT OFF SERVICES IN YOUR HOME?: NO

## 2024-12-11 SDOH — SOCIAL STABILITY: SOCIAL INSECURITY: ARE THERE ANY APPARENT SIGNS OF INJURIES/BEHAVIORS THAT COULD BE RELATED TO ABUSE/NEGLECT?: NO

## 2024-12-11 SDOH — ECONOMIC STABILITY: HOUSING INSECURITY: IN THE PAST 12 MONTHS, HOW MANY TIMES HAVE YOU MOVED WHERE YOU WERE LIVING?: 0

## 2024-12-11 SDOH — SOCIAL STABILITY: SOCIAL INSECURITY
WITHIN THE LAST YEAR, HAVE YOU BEEN KICKED, HIT, SLAPPED, OR OTHERWISE PHYSICALLY HURT BY YOUR PARTNER OR EX-PARTNER?: NO

## 2024-12-11 SDOH — SOCIAL STABILITY: SOCIAL INSECURITY: ABUSE: ADULT

## 2024-12-11 SDOH — ECONOMIC STABILITY: FOOD INSECURITY: WITHIN THE PAST 12 MONTHS, YOU WORRIED THAT YOUR FOOD WOULD RUN OUT BEFORE YOU GOT THE MONEY TO BUY MORE.: NEVER TRUE

## 2024-12-11 SDOH — SOCIAL STABILITY: SOCIAL INSECURITY: DO YOU FEEL ANYONE HAS EXPLOITED OR TAKEN ADVANTAGE OF YOU FINANCIALLY OR OF YOUR PERSONAL PROPERTY?: NO

## 2024-12-11 SDOH — HEALTH STABILITY: MENTAL HEALTH: CURRENT SMOKER: 0

## 2024-12-11 SDOH — SOCIAL STABILITY: SOCIAL INSECURITY: HAS ANYONE EVER THREATENED TO HURT YOUR FAMILY OR YOUR PETS?: NO

## 2024-12-11 SDOH — SOCIAL STABILITY: SOCIAL INSECURITY: ARE YOU OR HAVE YOU BEEN THREATENED OR ABUSED PHYSICALLY, EMOTIONALLY, OR SEXUALLY BY ANYONE?: NO

## 2024-12-11 SDOH — SOCIAL STABILITY: SOCIAL INSECURITY: WERE YOU ABLE TO COMPLETE ALL THE BEHAVIORAL HEALTH SCREENINGS?: YES

## 2024-12-11 SDOH — ECONOMIC STABILITY: TRANSPORTATION INSECURITY: IN THE PAST 12 MONTHS, HAS LACK OF TRANSPORTATION KEPT YOU FROM MEDICAL APPOINTMENTS OR FROM GETTING MEDICATIONS?: NO

## 2024-12-11 SDOH — SOCIAL STABILITY: SOCIAL INSECURITY: HAVE YOU HAD THOUGHTS OF HARMING ANYONE ELSE?: NO

## 2024-12-11 ASSESSMENT — COGNITIVE AND FUNCTIONAL STATUS - GENERAL
PATIENT BASELINE BEDBOUND: NO
STANDING UP FROM CHAIR USING ARMS: TOTAL
MOVING TO AND FROM BED TO CHAIR: A LOT
HELP NEEDED FOR BATHING: A LOT
MOVING FROM LYING ON BACK TO SITTING ON SIDE OF FLAT BED WITH BEDRAILS: A LOT
TURNING FROM BACK TO SIDE WHILE IN FLAT BAD: A LOT
TOILETING: A LOT
TOILETING: A LOT
DRESSING REGULAR LOWER BODY CLOTHING: TOTAL
MOBILITY SCORE: 10
WALKING IN HOSPITAL ROOM: TOTAL
MOVING FROM LYING ON BACK TO SITTING ON SIDE OF FLAT BED WITH BEDRAILS: A LOT
WALKING IN HOSPITAL ROOM: TOTAL
DAILY ACTIVITIY SCORE: 16
DRESSING REGULAR UPPER BODY CLOTHING: A LOT
DAILY ACTIVITIY SCORE: 15
CLIMB 3 TO 5 STEPS WITH RAILING: TOTAL
CLIMB 3 TO 5 STEPS WITH RAILING: TOTAL
TURNING FROM BACK TO SIDE WHILE IN FLAT BAD: A LOT
STANDING UP FROM CHAIR USING ARMS: A LOT
MOVING TO AND FROM BED TO CHAIR: A LOT
DRESSING REGULAR UPPER BODY CLOTHING: A LOT
MOBILITY SCORE: 9
DRESSING REGULAR LOWER BODY CLOTHING: A LOT
HELP NEEDED FOR BATHING: A LOT

## 2024-12-11 ASSESSMENT — PAIN SCALES - GENERAL
PAINLEVEL_OUTOF10: 5 - MODERATE PAIN
PAINLEVEL_OUTOF10: 8
PAIN_LEVEL: 0
PAINLEVEL_OUTOF10: 8
PAINLEVEL_OUTOF10: 4
PAINLEVEL_OUTOF10: 0 - NO PAIN
PAINLEVEL_OUTOF10: 5 - MODERATE PAIN
PAINLEVEL_OUTOF10: 0 - NO PAIN

## 2024-12-11 ASSESSMENT — ENCOUNTER SYMPTOMS
COUGH: 0
DIARRHEA: 0
SHORTNESS OF BREATH: 0
VOMITING: 0
CONFUSION: 0
WHEEZING: 0
DIZZINESS: 0
NECK PAIN: 0
NUMBNESS: 0
NAUSEA: 1
WEAKNESS: 0
TREMORS: 0
ABDOMINAL PAIN: 0
COLOR CHANGE: 0
DIAPHORESIS: 0
FATIGUE: 0
ABDOMINAL DISTENTION: 0
AGITATION: 0

## 2024-12-11 ASSESSMENT — LIFESTYLE VARIABLES
HOW MANY STANDARD DRINKS CONTAINING ALCOHOL DO YOU HAVE ON A TYPICAL DAY: PATIENT DOES NOT DRINK
HOW OFTEN DO YOU HAVE A DRINK CONTAINING ALCOHOL: NEVER
AUDIT-C TOTAL SCORE: 0
HOW OFTEN DO YOU HAVE 6 OR MORE DRINKS ON ONE OCCASION: NEVER
SKIP TO QUESTIONS 9-10: 1
AUDIT-C TOTAL SCORE: 0

## 2024-12-11 ASSESSMENT — COLUMBIA-SUICIDE SEVERITY RATING SCALE - C-SSRS
6. HAVE YOU EVER DONE ANYTHING, STARTED TO DO ANYTHING, OR PREPARED TO DO ANYTHING TO END YOUR LIFE?: NO
2. HAVE YOU ACTUALLY HAD ANY THOUGHTS OF KILLING YOURSELF?: NO
2. HAVE YOU ACTUALLY HAD ANY THOUGHTS OF KILLING YOURSELF?: NO
1. IN THE PAST MONTH, HAVE YOU WISHED YOU WERE DEAD OR WISHED YOU COULD GO TO SLEEP AND NOT WAKE UP?: NO
1. IN THE PAST MONTH, HAVE YOU WISHED YOU WERE DEAD OR WISHED YOU COULD GO TO SLEEP AND NOT WAKE UP?: NO
6. HAVE YOU EVER DONE ANYTHING, STARTED TO DO ANYTHING, OR PREPARED TO DO ANYTHING TO END YOUR LIFE?: NO

## 2024-12-11 ASSESSMENT — PAIN - FUNCTIONAL ASSESSMENT
PAIN_FUNCTIONAL_ASSESSMENT: 0-10
PAIN_FUNCTIONAL_ASSESSMENT: 0-10
PAIN_FUNCTIONAL_ASSESSMENT: UNABLE TO SELF-REPORT
PAIN_FUNCTIONAL_ASSESSMENT: 0-10

## 2024-12-11 ASSESSMENT — ACTIVITIES OF DAILY LIVING (ADL)
DRESSING YOURSELF: INDEPENDENT
HEARING - RIGHT EAR: FUNCTIONAL
PATIENT'S MEMORY ADEQUATE TO SAFELY COMPLETE DAILY ACTIVITIES?: YES
WALKS IN HOME: INDEPENDENT
HEARING - LEFT EAR: FUNCTIONAL
GROOMING: INDEPENDENT
ADEQUATE_TO_COMPLETE_ADL: YES
TOILETING: INDEPENDENT
BATHING: INDEPENDENT
LACK_OF_TRANSPORTATION: NO
LACK_OF_TRANSPORTATION: NO
FEEDING YOURSELF: INDEPENDENT
JUDGMENT_ADEQUATE_SAFELY_COMPLETE_DAILY_ACTIVITIES: YES

## 2024-12-11 ASSESSMENT — PAIN DESCRIPTION - PAIN TYPE: TYPE: ACUTE PAIN

## 2024-12-11 ASSESSMENT — PAIN DESCRIPTION - FREQUENCY: FREQUENCY: CONSTANT/CONTINUOUS

## 2024-12-11 ASSESSMENT — PAIN DESCRIPTION - ORIENTATION: ORIENTATION: RIGHT

## 2024-12-11 ASSESSMENT — PAIN DESCRIPTION - DESCRIPTORS: DESCRIPTORS: ACHING

## 2024-12-11 ASSESSMENT — PATIENT HEALTH QUESTIONNAIRE - PHQ9
2. FEELING DOWN, DEPRESSED OR HOPELESS: NOT AT ALL
1. LITTLE INTEREST OR PLEASURE IN DOING THINGS: NOT AT ALL
SUM OF ALL RESPONSES TO PHQ9 QUESTIONS 1 & 2: 0

## 2024-12-11 ASSESSMENT — PAIN DESCRIPTION - PROGRESSION: CLINICAL_PROGRESSION: GRADUALLY IMPROVING

## 2024-12-11 ASSESSMENT — PAIN SCALES - WONG BAKER: WONGBAKER_NUMERICALRESPONSE: HURTS LITTLE BIT

## 2024-12-11 ASSESSMENT — PAIN DESCRIPTION - ONSET: ONSET: ONGOING

## 2024-12-11 ASSESSMENT — PAIN DESCRIPTION - LOCATION: LOCATION: HIP

## 2024-12-11 NOTE — PROGRESS NOTES
Physical Therapy                 Therapy Communication Note    Patient Name: Sue Carter  MRN: [id number]  Department: [place] [address]  Room: [facility]  Today's Date: 12/11/2024     Discipline: Physical Therapy    Missed Visit Reason: Missed Visit Reason: Patient placed on medical hold (Pt is on strict bedrest with plan for OR today.  PT will hold until pt is medically appropriate and schedule allows post-operatively)    Missed Time: Attempt 0819

## 2024-12-11 NOTE — PROGRESS NOTES
"Orthopaedic Surgery Progress Note    S:    Evaluated post-operatively in PACU. Pain controlled on current regimen.  Denies any new onset numbness, tingling or weakness. Denies nausea, vomiting, chest pain, dyspnea, or calf tenderness. Denies any fever or chills. Ibarra in place.  PT.     O:  /73   Pulse 81   Temp 36.3 °C (97.3 °F)   Resp 24   Ht 1.676 m (5' 5.98\")   Wt 68 kg (150 lb)   SpO2 100%   BMI 24.22 kg/m²     GEN - NAD, resting comfortably in hospital bed  HEENT - MMM, EOMI, NCAT  CV - RRR by peripheral palpation, limbs wwp  PULM - NWOB on RA  ABD - Non-distended  NEURO - MILLS spontaneously, CNs II - XII grossly intact  PSYCH - Appropriate mood and affect    MSK:  RLE:   - Skin intact, no obvious deformity  - Tender at surgical site w painful ROM   - Post-operative Mepilex c/d/I   - Motor intact in DF/PF/EHL/FHL  - SILT in saph/sural/SPN/DPN distributions  - Foot wwp, 2+ DP/PT pulse, brisk cap refill  - Compartments soft and compressible, no pain with passive dorsiflexion     A/P: 74 y.o. female PMH Cerebral amyloid angiopathy, hx of DVT s/p R acetabulum ORIF on 12/11 with Dr. Benitez.    Plan:  - Weight bearing: FFWB, Posterior Hip Precautions  - DVT ppx: SCDs, okay for chemo PPx per primary; recommend at least ASA 81 mg BID for 4 weeks post-op  - Diet: Okay for diet from orthopedic perspective   - Perioperative Antibiotics: 24 hour perioperative ancef   - Please send with Calcium (as carbonate)-Vitamin D 600mg-400IU PO BID for 30 days upon discharge   - Drain: None  - Post-operative Imaging: None   - No plans to return to the OR with orthopedic surgery this admission.   - Pt will return to Trauma Service.    This plan was discussed with the attending, Dr. Benitez.    Dominga Mosqueda, DO  Orthopaedic Surgery PGY-1  [facility]     First call: Dominga Mosqueda, PGY-1, Evelio Connors, PGY-1  Second call: Gunner Salcedo, PGY-2  Third call: Robert Garcia, PGY-3    From 6pm-7am, weekends, " holidays, and if no answer and there is an emergent issue, please page orthopaedic consult pager, [phone].

## 2024-12-11 NOTE — CARE PLAN
The patient's goals for the shift include comfort     The clinical goals for the shift include pain control

## 2024-12-11 NOTE — HOSPITAL COURSE
"74 year-old female presents as a trauma consult, transferred from [facility], s/p mechanical ground level fall. Patient states she was walking and her \"legs gave out\". She denies dizziness, lightheadedness prior to the fall. She fell, landing on her right side. Patient unable to ambulate after the fall. Imaging obtained at outside hospital shows comminuted fracture of right acetabulum. She denies blood thinner use, head strike, and loss of consciousness. She endorses right hip pain and mild nausea at time of exam. Patient went to the OR with orthopedics for R hip posterior ORIF. Patient FFWB with posterior hip precautions post-operatively. Patient received nilson-operative Ancef. Working with PT/OT, rec'd acute rehab. Able to work with PT and perform stairs appropriately. Has capacity, requesting HH and will stay with her friend Radha who confirmed will be able to provide 24/7 care. Patient and Radha educated by PT today for movement restrictions. HHC SOC confirmed.     At the time of discharge, patient's pain was controlled with oral analgesia, patient was urinating, having BMs, sleeping, and tolerating a diet. Based on PT/OT's recommendation, patient was discharged in stable condition with scripts and follow up appointments as appropriate. Discharge plan was discussed with the patient/family and all questions were discussed and answered. Patient/family agreeable to plan. Patient discharged in stable condition, being transported by her friend. Discussed risks and benefits associated with other modes of transportation, patient with capacity able to relay back; still wishes to pursue.    PCP Followup:  Please follow up with your PCP within 1-2 weeks after discharge regarding your recent hospital admission.      Orthopedic Followup:  If you have not been contacted within 2-3 days of discharge from the hospital, please call [phone] to schedule your follow up visit within 2 weeks of discharge with orthopedics " regarding your recent hospital admission.

## 2024-12-11 NOTE — CONSULTS
Orthopaedic Surgery Consult H&P    HPI:   Orthopaedic Problems/Injuries: R PW tab fx  Other Injuries: None     74F (Cerebral amyloid angiopathy, hx of DVT) mGLF transferred from [facility]. Community ambulator at baseline. Golfer.Denies numbness, tingling, and open wounds on the affected limb.     PMH: per above/EMR  PSH: per above/EMR  SocHx:      -  Denies tobacco use      -  Denies EtOH use      -  Denies other drug use  FamHx:  Non-contributory to this patient's acute orthopaedic problem.   Allergies: Reviewed in EMR  Meds: Reviewed in EMR    ROS      - 14 point ROS negative except as above    Physical Exam:  Gen: AOx3, NAD  HEENT: normocephalic atraumatic  Psych: appropriate mood and affect  Resp: nonlabored breathing  Cardiac: Extremities WWP, RRR to peripheral palpation  Neuro: CN 2-12 grossly intact  Skin: no rashes    Right lower extremity:  - Skin intact   - Tender to palpation over right hip  - Fires EHL/DF/PF.  - Sensation intact to light touch in sural, saphenous, superficial/deep peroneal, tibial nerve distributions.  - 2+ DP pulse, < 2 seconds capillary refill.    A full secondary exam was performed and all relevant findings discussed and noted above.    Imaging:  AP and lateral radiographs of the right hip display concentrically reduced hip with comminuted acetabular fracture.    Assessment:  Orthopaedic Problems/Injuries: R PW tab fx    74F (Cerebral amyloid angiopathy, hx of DVT) mGLF transferred from [facility]. Community ambulator at baseline. Golfer. On exam, closed. NVI. CT w PW tab fx w femoral head subluxation. Placed in 14lb DF traction.          Plan:  - NPO today for upcoming surgery with orthopedics.  - Admit to Trauma; Appreciate documentation of clearance by primary team  - Please obtain pre-operative labs/studies: T&S, PT/INR, CBC, BMP, CXR, EKG  - Please place bynum in setting of immobilizing acetabulum fracture   - Consented and posted to OR schedule for ORIF + MIGDALIA w/ orthopedic surgery on  12/11  - Strict Bedrest, NWB RLE extremity.   - Pre-operative ABx: None indicated   - No indication for transfusion pre-operatively,  2U PRBC on hold for OR   - DVT PPx: SCDs, okay for chemoppx per primary    Consult seen and staffed within 30 minutes of notification.    This consult was staffed with attending physician, Dr. Benitez.    Elías Bonilla MD  PGY-2 Orthopaedic Surgery  On-call Resident  _________________________________________________________    This patient will be followed by the Ortho Trauma Team while inpatient. See team members and contacts below:    Ortho Trauma  First Call: Evelio Connors, PGY-1 & Dominga Mosqueda  Second Call: Gunner Salcedo, PGY-2  Third Call: Robert Garcia PGY-3

## 2024-12-11 NOTE — OP NOTE
Hip Acetabular Fracture ORIF Posterior (R) Operative Note     Date: 2024  OR Location: [facility] OR    Name: Sue Carter, : 1950, Age: 74 y.o., MRN: [id number], Sex: female    Diagnosis  Pre-op Diagnosis      * Closed displaced fracture of right acetabulum, unspecified portion of acetabulum, initial encounter (Multi) [S32.401A] Post-op Diagnosis     * Closed displaced fracture of right acetabulum, unspecified portion of acetabulum, initial encounter (Multi) [S32.401A]     Procedures  Open treatment of posterior column and posterior wall acetabular fracture    Hip labrum debridement    Removal of traction pin under anesthesia    Surgeons      * Bryan Benitez - Primary    Resident/Fellow/Other Assistant:  Surgeons and Role:     * John Mcclendon MD - Fellow    Dr. Mcclendon participated in this case as the assistant, performing components of the positioning, approach, debridement, reduction, fixation, and closure. Due to the nature and complexity of the case, no qualified resident of an appropriate level was available to assist.    Staff:   Circulator: Yanet  Scrub Person: Yany  Circulator: Neno Gimenez Person: Darshan Ruiz Scrub: Jose Ruiz Circulator: Monica    Anesthesia Staff: Anesthesiologist: Tom Beckett MD  C-AA: RAD Bhatt  Anesthesia Resident: Carlee Madrid MD    Procedure Summary  Anesthesia: General  ASA: II  Estimated Blood Loss: 300mL  Intra-op Medications:   Administrations occurring from 0952 to 1352 on 24:   Medication Name Total Dose   tobramycin (Nebcin) injection 1,200 mg   vancomycin (Vancocin) vial for injection 1 g   albumin human bottle 5% 250 mL   ceFAZolin (Ancef) vial 1 g 2 g   dexAMETHasone (Decadron) injection 4 mg/mL 8 mg   esmolol 10 mg/mL 20 mg   fentaNYL (Sublimaze) injection 50 mcg/mL 200 mcg   lactated Ringer's infusion Cannot be calculated   lidocaine (Xylocaine) injection 2 % 100 mg   midazolam PF (Versed) injection 1 mg/mL 1 mg    phenylephrine 40 mcg/mL syringe 10 mL 120 mcg   propofol (Diprivan) injection 10 mg/mL 120 mg   rocuronium (ZeMuron) 50 mg/5 mL injection 100 mg              Anesthesia Record               Intraprocedure I/O Totals          Intake    lactated Ringer's 700.00 mL    Total Intake 700 mL       Output    Urine 90 mL    Est. Blood Loss 300 mL    Total Output 390 mL       Net    Net Volume 310 mL          Specimen: No specimens collected              Drains and/or Catheters:   Urethral Catheter Double-lumen 16 Fr. (Active)   Site Assessment Clean;Skin intact 12/11/24 0952   Collection Container Standard drainage bag 12/11/24 0952   Securement Method Securing device (Describe) 12/11/24 0952   Reason for Continuing Urinary Catheterization surgical procedures: urological/gynecological, pelvic oncology, anal, prolonged surgical procedure 12/11/24 0206   Output (mL) 200 mL 12/11/24 0952   $ Urethral Catheter Charge Indwelling cath 12/11/24 0206       Tourniquet Times:         Implants:  Implants       Type Name Action Serial No.      Graft BONE CHIPS,  CANCELL 30CC 1.7-10MM - [id number] - [id number] Implanted [id number]     Screw PLATE, 3.5 SPRING 3H - [id number] Implanted      Screw SCREW, CORTICAL, SELF-TAPPING, 3.5 X 30 MM, STAINL[employer] STE[employer] - [id number] Implanted      Screw SCREW, CORTICAL, SELF-TAPPING, 3.5 X 34 MM, STAINLESS STEEL - [id number] Implanted      Screw PLATE, RECON 3.5 8H LP - [id number] Implanted      Screw SCREW, CORTICAL, SELF-TAPPING, PELVIC, 3.5 X 70 MM, STAINLESS STEEL - [id number] Implanted      Screw SCREW, CORTICAL, SELF-TAPPING, 3.5 X 32 MM, STAINLESS STEEL - [id number] Implanted      Screw SCREW CORTEX 3.5 X 45 - [id number] Implanted      Screw SCREW, CORTICAL, SELF-TAPPING, 3.5 X 36 MM, STAINLESS STEEL - [id number] Implanted      Screw SCREW, CORTICAL, SELF-TAPPING, 3.5 X 38 MM, STAINLESS STEEL - [id number] Implanted      Screw SCREW, CORTICAL, SELF-TAPPING, 3.5 X 50 MM, STAINLESS STEEL -  [id number] Implanted      Screw SCREW, CORTICAL, SELF-TAPPING, 3.5 X 22 MM, STAINLESS STEEL - [id number] Implanted      Screw SCREW, CORTICAL, SELF-TAPPING, 3.5 X 24 MM, STAINLESS STEEL - [id number] Implanted      Screw PLATE, RECON 3.5 X 108 6H CVD LP - [id number] Implanted      Screw SCREW, CORTICAL, SELF-TAPPING, PELVIC, 3.5 X 65 MM, STAINLESS STEEL - [id number] Implanted               Findings: Closed, displaced intra-articular right acetabular fracture involving the posterior column and posterior wall with noted injury to the labrum, marginal impaction, radiographic evidence of a femoral head injury.  Adequate reduction and appropriately placed hardware noted postoperatively.    Indications: Sue Carter is an 74 y.o. female who is having surgery for Closed displaced fracture of right acetabulum, unspecified portion of acetabulum, initial encounter (Multi) [S32.401A].  Patient has a history of cerebral amyloid angiopathy as well as a history of a DVT and subsequent IVC filter placement who sustained a ground-level fall resulting in the injury noted above.  There were no other significant injuries as result the incident.    The patient was seen in the preoperative area. The risks, benefits, complications, treatment options, non-operative alternatives, expected recovery and outcomes were discussed with the patient. The possibilities of reaction to medication, pulmonary aspiration, injury to surrounding structures, bleeding, recurrent infection, the need for additional procedures, failure to diagnose a condition, and creating a complication requiring transfusion or operation were discussed with the patient. The patient concurred with the proposed plan, giving informed consent.  The site of surgery was properly noted/marked if necessary per policy. The patient has been actively warmed in preoperative area. Preoperative antibiotics have been ordered and given within 1 hours of incision. Venous thrombosis prophylaxis  have been ordered including unilateral sequential compression device    Procedure Details: Patient was subsequently taken back to the operating room where initial timeout was performed clued patient's name, date of birth, MRN, procedure be performed, correct laterality which were agreed upon by the entire surgical staff.  Patient was then intubated in the supine position as per anesthesia protocol.  Next the traction pin was removed from the her distal femur under anesthesia after being appropriately sterilized.  She was then transition to the operating room table where she was placed in the left lateral decubitus position with all bony prominences well-padded.  An axillary roll was applied.  The pegs were installed in the patient's lateral decubitus position was maintained appropriately.  She was then prepped and draped in usual sterile fashion.    Preincision surgical pause performed ensuring administration of IV antibiotics and TXA.  A 15 cm incision was made over the right femur laterally taken advantage of the coplanar block approach..  The deeper tissues were incised used utilizing electrocautery.  The IT band was identified as was the gluteus kwadwo fascia proximally and this was incised utilizing a 10 blade.  The hemorrhagic bursa was excised.  The gluteus minimus was slightly debrided.  The piriformis was identified and tagged.  The conjoined tendon was then identified and tagged.  Both these structures were then incised.  The sciatic nerve was identified and protected throughout the entirety of the case.  We then identified our fracture site of the posterior wall which was noted to be in at least 3 pieces.  These were all relatively well-maintained given their soft tissue connections and this was maintained as best as possible.  The posterior wall fracture was then elevated and the joint was thoroughly inspected and irrigated flushing out any intra-articular debris.  The labrum was then debrided.  The  marginal impaction of the acetabulum was then elevated down to the femoral head using the femoral head as a template.  The bone void was then filled utilizing freeze-dried bone graft.  The wall was then reduced and this was held utilizing K wire fixation.  At this point, fluoroscopic images were obtained and we are overall happy with the reduction.  We then applied 3 spring plates and these maintained our overall reduction appropriately.  These plates were secured utilizing nonlocking screw fixation.  Next, we turned our attention to the posterior wall buttress plate and this was contoured appropriately.  Then secured utilizing nonlocking screw fixation.  Again, fluoroscopic images were obtained and we are overall happy with our hardware placement and reduction.  A final, more medial plate was applied to treat the posterior column aspect of the fracture.  This was secured utilizing nonlocking screw fixation.  All provisional hardware was then removed.  We took final fluoroscopic images and were overall happy with our reduction, hardware placement and noted that all screws were extra-articular.  Using several liters of sterile saline.  Bone tunnels were utilized to repair the external rotators back to the greater trochanter.  The IT band was closed utilizing in a running fashion.  2-0 Vicryl was utilized to close the deep dermal layer and it interrupted fashion.  Staples utilized to close the skin.  A sterile bandage was applied.  The drapes taken down the counts were all correct x 2.  Patient was then transition to the supine position where she was subsequently transferred to the hospital bed in the supine position.  She was then awakened as per anesthesia protocol.  Neck she was taken back to the PACU in stable condition.    Patient will be foot flat weightbearing on the operative extremity.  We will discuss anticoagulation with the primary team given the patient's significant history.  She is to receive IV  antibiotics 24 hours while in house.  She will return to the clinic in 2 to 3 weeks for repeat evaluation, staple removal, and repeat radiographs of the left right hip including AP, lateral, and Judet a views as well as an AP pelvis    Complications:  None; patient tolerated the procedure well.    Disposition: PACU - hemodynamically stable.  Condition: stable         Attending Attestation: I was present for the entire procedure.    Bryan Benitez  Phone Number: [phone]

## 2024-12-11 NOTE — BRIEF OP NOTE
Date: 2024  OR Location: [facility] OR    Name: Sue Carter : 1950, Age: 74 y.o., MRN: [id number], Sex: female    Diagnosis  Pre-op Diagnosis      * Closed displaced fracture of right acetabulum, unspecified portion of acetabulum, initial encounter (Multi) [S32.401A] Post-op Diagnosis     * Closed displaced fracture of right acetabulum, unspecified portion of acetabulum, initial encounter (Multi) [S32.401A]     Procedures  Hip Acetabular Fracture ORIF Posterior  51240 - MS OPTX ACTBLR FX INVG ANT&POST 2 COLUMNS FX W/INT      Surgeons      * Bryan Benitez - Primary    Resident/Fellow/Other Assistant:  Surgeons and Role:     * John Mcclendon MD - Fellow     * Dominga Mosqueda DO - Resident    Staff:   Circulator: Yanet Gimenez Person: Yany  Circulator: Neno Gimenez Person: Darshan Ruiz Scrub: Jose Ruiz Circulator: Monica    Anesthesia Staff: Anesthesiologist: Tom Beckett MD  C-AA: RAD Bhatt  Anesthesia Resident: Carlee Madrid MD    Procedure Summary  Anesthesia: General  ASA: II  Estimated Blood Loss: 300mL  Intra-op Medications:   Administrations occurring from 0952 to 1352 on 24:   Medication Name Total Dose   tobramycin (Nebcin) injection 1,200 mg   vancomycin (Vancocin) vial for injection 1 g   albumin human bottle 5% 250 mL   ceFAZolin (Ancef) vial 1 g 2 g   dexAMETHasone (Decadron) injection 4 mg/mL 8 mg   esmolol 10 mg/mL 20 mg   fentaNYL (Sublimaze) injection 50 mcg/mL 200 mcg   lactated Ringer's infusion Cannot be calculated   lidocaine (Xylocaine) injection 2 % 100 mg   midazolam PF (Versed) injection 1 mg/mL 1 mg   phenylephrine 40 mcg/mL syringe 10 mL 120 mcg   propofol (Diprivan) injection 10 mg/mL 120 mg   rocuronium (ZeMuron) 50 mg/5 mL injection 100 mg              Anesthesia Record               Intraprocedure I/O Totals          Intake    lactated Ringer's 700.00 mL    Total Intake 700 mL       Output    Urine 90 mL    Est. Blood Loss 300 mL     Total Output 390 mL       Net    Net Volume 310 mL          Specimen: No specimens collected               Findings: R posterior acetabulum fx    Complications:  None; patient tolerated the procedure well.     Disposition: PACU - hemodynamically stable.  Condition: stable  Specimens Collected: No specimens collected  Attending Attestation:     Bryan Benitez  Phone Number: [phone]

## 2024-12-11 NOTE — PROGRESS NOTES
"Pharmacy Medication History Review    Sue Carter is a 74 y.o. female admitted for Closed displaced fracture of right acetabulum (Multi). Pharmacy reviewed the patient's [id number] medications and allergies for accuracy.    The list below reflects the updated PTA list.   Prior to Admission Medications   Prescriptions Last Dose Informant Patient Reported? Taking?   multivitamin with minerals tablet 12/9/24 Self Yes Yes   Sig: Take 1 tablet by mouth once daily.      Facility-Administered Medications: None        The list below reflects the updated allergy list. Please review each documented allergy for additional clarification and justification.  Allergies  Reviewed by Judi Rendon RN on 12/11/2024   No Known Allergies         Patient declines M2B at discharge.     Sources:   Patient Interview - good historian  Admission MedRec Grid  OARRS - none  EPIC medication dispense report - none    Medications ADDED:  MVI  Medications CHANGED:  None  Medications REMOVED/MARKED NOT TAKING:   None     Additional Comments:  Patient endorses no current prescription medication use    Apurva Patrick, PharmD  Transitions of Care Pharmacist  12/11/24     Secure Chat preferred   If no response call [phone] or [employer] \"Med Rec\"    "

## 2024-12-11 NOTE — ANESTHESIA POSTPROCEDURE EVALUATION
Patient: Sue Carter    Procedure Summary       Date: 12/11/24 Room / Location: [facility] OR 01 / [facility] OR    Anesthesia Start: 1037 Anesthesia Stop: 1458    Procedure: Hip Acetabular Fracture ORIF Posterior (Right: Hip) Diagnosis:       Closed displaced fracture of right acetabulum, unspecified portion of acetabulum, initial encounter (Multi)      (Closed displaced fracture of right acetabulum, unspecified portion of acetabulum, initial encounter (Multi) [S32.401A])    Surgeons: Bryan Benitez MD Responsible Provider: Tom Beckett MD    Anesthesia Type: general ASA Status: 2            Anesthesia Type: general    Vitals Value Taken Time   /73 12/11/24 1452   Temp 36.3 12/11/24 1458   Pulse 86 12/11/24 1454   Resp 23 12/11/24 1454   SpO2 100 % 12/11/24 1454   Vitals shown include unfiled device data.    Anesthesia Post Evaluation    Patient location during evaluation: PACU  Patient participation: complete - patient participated  Level of consciousness: sleepy but conscious  Pain score: 0  Pain management: adequate  Multimodal analgesia pain management approach  Airway patency: patent  Two or more strategies used to mitigate risk of obstructive sleep apnea  Cardiovascular status: acceptable, hemodynamically stable and blood pressure returned to baseline  Respiratory status: acceptable, face mask and airway suctioned  Hydration status: acceptable  Postoperative Nausea and Vomiting: none        No notable events documented.

## 2024-12-11 NOTE — ED PROVIDER NOTES
CC: Fall     History provided by: Patient  Limitations to History: None    HPI:  Patient is a 74-year-old female with history of cerebral amyloid angiopathy, osteoporosis, DVT not on anticoagulation secondary to prior subarachnoid hemorrhage who presents as a transfer from [facility] emergency department for orthopedic surgery evaluation.  Patient initially presented to outside hospital with right arm and leg pain after a fall.  Patient states she was trying to put something down her legs came out from under her causing her to land on her right hip.  She cannot bear weight on the right lower extremity.  She denies any prodrome, chest pain, dizziness, shortness of breath prior to the fall.  Patient states she is not on blood thinners, denies any headache, vision changes at this time.  She denies any back pain.  Notes minimal pain right now in her right lower extremity.  She received 10 mg of ketamine by physicians ambulance [place] [facility]    External Records Reviewed:  I reviewed prior ED visits, Care Everywhere, discharge summaries and outpatient records as appropriate.   ???????????????????????????????????????????????????????????????  Triage Vitals:  T 36.8 °C (98.2 °F)  HR 84  /88  RR 16  O2 96 % None (Room air)    Physical Exam  Constitutional:       Appearance: Normal appearance.   HENT:      Head: Normocephalic and atraumatic.   Eyes:      Conjunctiva/sclera: Conjunctivae normal.   Cardiovascular:      Rate and Rhythm: Normal rate and regular rhythm.   Pulmonary:      Effort: Pulmonary effort is normal.      Breath sounds: Normal breath sounds.      Comments: On Supplemental O2  Abdominal:      General: Abdomen is flat.      Palpations: Abdomen is soft.      Tenderness: There is no abdominal tenderness.   Musculoskeletal:      Comments: No midline C spine tenderness to palpation, right lower extremity appears slightly shortened when compared to the left however no obvious external or internal rotation,  tenderness to right hip, overall compartments are soft, no open wounds, palpable common femoral pulses bilaterally, sensation in bilateral lower extremities grossly intact and symmetric, 5 out of 5 strength with plantarflexion and dorsiflexion bilaterally, limited flexion extension at hip and knee secondary to pain and right lower extremity   Neurological:      Mental Status: She is alert.        ???????????????????????????????????????????????????????????????  ED Course/Treatment/Medical Decision Making  MDM:  Patient is a 74-year-old female who presents as a transfer from outside hospital for orthopedic surgery evaluation in the setting of right lower extremity injury status post mechanical fall.  I reviewed imaging from outside hospital as below. Labs from outside hospital reviewed and type and screen repeated.  CMP within normal limits, coagulation screen within normal limits, CBC with leukocytosis of 16.2 and left shift otherwise within normal limits.  On examination, she is neurovascularly intact in right lower extremity, no open wounds noted.  Patient will be given IV analgesics as needed.  Trauma surgery and orthopedic surgery consulted.      ED Course:  ED Course as of 12/11/24 0019   Tue Dec 10, 2024   8000 CTH reviewed:  CT HEAD:  1. No acute intracranial abnormality or calvarial fracture.  2. Right temporal lobe encephalomalacia.  3. Diffuse nonspecific white matter changes, likely sequela of  small-vessel ischemic disease.   [SA]   5944 CT C spine reviewed:  CT CERVICAL SPINE:  1. No acute fracture or traumatic malalignment of the cervical spine.   [SA]   2319 CT Pelvis:  IMPRESSION:  1. Posterior subluxation of the right femoral head with resultant  comminuted fracture of the posterior wall of the right acetabulum and  posterior column. There is also a mild impaction deformity of the  right femoral head.  2. Fat stranding and edema about the fracture sites, including about  the right sciatic nerve.  Correlate for clinical evidence of  neurovascular injury.   [SA]   2319 CXR:  IMPRESSION:  No acute cardiopulmonary process.      Emphysema.   [SA]   2335 Patient to be placed in traction by ortho, will go to OR tomorrow, trauma surgery aware of consult [SA]   2358 ECG 12 Lead  Interpreted by me.  12/10/2024 at 2343.  Sinus rhythm 63 bpm.  , QRS 84, QTc 437.  No ST elevation. []      ED Course User Index  [] Neno Lees MD  [] Angélica Hopper DO         Diagnoses as of 12/11/24 0019   Closed displaced fracture of right acetabulum, unspecified portion of acetabulum, initial encounter (Multi)         EKG Interpretation:  See ED Course/Below:    Independent Interpretation of Studies:  I independently interpreted labs/imaging as stated in ED Course or below.    Differential diagnoses considered include but are not limited to: See MDM/Below:    Social Determinants Limiting Care:  None identified The following actions were taken to address these social determinants:      Discussion of Management with Other Providers: See MDM/Below:    Disposition:  Admitted    ANTONIO Mercado, PGY-3    I reviewed the case with the attending ED physician. The attending ED physician agrees with the plan. Patient and/or patient´s representative was counseled regarding labs, imaging, likely diagnosis, and plan. All questions were answered.    Disclaimer: This note was dictated by speech recognition.  Attempt at proofreading was made to minimize errors.  Errors in transcription may be present.  Please call if questions.    Procedures ? SmartLinks last updated 12/11/2024 12:19 AM        Angélica Hopper DO  Resident  12/11/24 0020

## 2024-12-11 NOTE — ED NOTES
This RN called report to Charge RN at [facility] ED at this time and answered all questions. Pt transported to [facility].      Gabrielle Spatz, RN  12/10/24 7821

## 2024-12-11 NOTE — ANESTHESIA PROCEDURE NOTES
Airway  Date/Time: 12/11/2024 10:47 AM  Urgency: elective    Airway not difficult    Staffing  Performed: RAD   Authorized by: Tom Beckett MD    Performed by: RAD Bhatt  Patient location during procedure: OR    Indications and Patient Condition  Indications for airway management: anesthesia  Spontaneous Ventilation: absent  Sedation level: deep  Preoxygenated: yes  Patient position: sniffing  MILS maintained throughout  Mask difficulty assessment: 1 - vent by mask  Planned trial extubation    Final Airway Details  Final airway type: endotracheal airway      Successful airway: ETT     Successful intubation technique: direct laryngoscopy  Facilitating devices/methods: intubating stylet  Endotracheal tube insertion site: oral  Blade: Santosh  Blade size: #3  ETT size (mm): 7.0  Cormack-Lehane Classification: grade I - full view of glottis  Placement verified by: chest auscultation and capnometry   Measured from: teeth  ETT to teeth (cm): 22  Number of attempts at approach: 1  Number of other approaches attempted: 0

## 2024-12-11 NOTE — CARE PLAN
The patient's goals for the shift include pain control     The clinical goals for the shift include pain control      Problem: Pain  Goal: Takes deep breaths with improved pain control throughout the shift  Outcome: Progressing  Goal: Turns in bed with improved pain control throughout the shift  Outcome: Progressing  Goal: Walks with improved pain control throughout the shift  Outcome: Progressing  Goal: Performs ADL's with improved pain control throughout shift  Outcome: Progressing  Goal: Participates in PT with improved pain control throughout the shift  Outcome: Progressing  Goal: Free from opioid side effects throughout the shift  Outcome: Progressing  Goal: Free from acute confusion related to pain meds throughout the shift  Outcome: Progressing

## 2024-12-11 NOTE — H&P
"[facility]  TRAUMA SERVICE - HISTORY AND PHYSICAL / CONSULT    Patient Name: Sue Carter  MRN: [id number]  Admit Date: 1210  : 1950  AGE: 74 y.o.   GENDER: female  ==============================================================================  MECHANISM OF INJURY / CHIEF COMPLAINT:   74 year-old female presents as a trauma consult, transferred from [facility], s/p mechanical ground level fall. Patient states she was walking and her \"legs gave out\". She denies dizziness, lightheadedness prior to the fall. She fell, landing on her right side. Patient unable to ambulate after the fall. Imaging obtained at outside hospital shows comminuted fracture of right acetabulum. She denies blood thinner use, head strike, and loss of consciousness. She endorses right hip pain and mild nausea at time of exam.     LOC (yes/no?): no  Anticoagulant / Anti-platelet Rx? (for what dx?): denies  Referring Facility Name (N/A for scene EMR run): [facility]    INJURIES:   Comminuted R acetabular fx with posterior subluxation of R femoral head    OTHER MEDICAL PROBLEMS:  Hx cerebral amyloid angiopathy  Hx DVT () not on anticoagulation  Hx osteoporosis    INCIDENTAL FINDINGS:  Right temporal lobe encephalomalacia.  Diffuse nonspecific white matter changes - likely sequela of small-vessel ischemic disease    ==============================================================================  ADMISSION PLAN OF CARE:  CT head, C-spine, pelvis, and R extremity XRs reviewed. Injury as above. No indication for further imaging at this time as patient nontender, without external signs of trauma on exam.   Orthopedics consulted, appreciate recommendations  EKG: NSR, HR 63, no ischemic changes   CXR negative for acute cardiopulmonary process  RCRI 0, Class I Risk, 3.9% 30-day risk of death, MI, or cardiac arrest. Given the urgency of orthopedic surgical intervention and review of the above risk factors, there is no " additional workup necessary from a trauma perspective. Final risk versus benefits of all surgical procedures is based on the surgical and the anesthesia teams' assessments. Patient is medically optimized for surgery from a trauma surgery perspective at this time.  Clear liquid diet, NPO at 0500  Multimodal pain control  DVT Ppx: SCDs, Lvx 30 BID  PT/OT when appropriate    Dispo: admit to trauma floor with telemetry    Patient and plan discussed with attending, Dr. Valles.      Rosemary Duke PA-C  Trauma, Critical Care, and Acute Care Surgery  Floor: [id number] TSICU: [id number]    Total time of 32 minutes spent reviewing PMH, examining the patient, and documenting in the EMR with with >50% of time spent face to face with patient/family discussing plan of care/management, counseling/educating on disease processes, explaining results of diagnostic testing.  ==============================================================================  PAST MEDICAL HISTORY:   PMH: Cerebral amyloid angiopathy, osteoporosis, bilateral dry macular degeneration, Hx DVT (2020)  No past medical history on file.    PSH:   No past surgical history on file.  FH:   Family History   Problem Relation Name Age of Onset    Breast cancer Mother  80     SOCIAL HISTORY:    Smoking: denies   Social History     Tobacco Use   Smoking Status Never   Smokeless Tobacco Never       Alcohol: denies   Social History     Substance and Sexual Activity   Alcohol Use Never       Drug use: denies    MEDICATIONS: denies  Prior to Admission medications    Not on File     ALLERGIES: NKDA  No Known Allergies    REVIEW OF SYSTEMS:  Review of Systems   Constitutional:  Negative for diaphoresis and fatigue.   Respiratory:  Negative for cough, shortness of breath and wheezing.    Gastrointestinal:  Positive for nausea. Negative for abdominal distention, abdominal pain, diarrhea and vomiting.   Musculoskeletal:  Positive for gait problem. Negative for neck pain.   Skin:  Negative  for color change.   Neurological:  Negative for dizziness, tremors, syncope, weakness and numbness.   Psychiatric/Behavioral:  Negative for agitation and confusion.      PHYSICAL EXAM:  PRIMARY SURVEY:  Airway  Airway is patent.     Breathing  Breathing is normal. Right breath sounds are normal. Left breath sounds are normal.     Circulation  Cardiac rhythm is regular. Rate is regular.   Pulses  Radial: 2+ on the right; 2+ on the left.  Pedal: 2+ on the right; 2+ on the left.    Disability  Nedrow Coma Score  Eye:4   Verbal:5   Motor:6      15  Pupils  Right Pupil:   round        Left Pupil:   round           Motor Strength   strength:  5/5 on the right  5/5 on the left  Dorsiflex strength:  5/5 on the right  5/5 on the left  Plantarflex strength:  5/5 on the right  5/5 on the left  The patient does not have a sensory deficit.       SECONDARY SURVEY/PHYSICAL EXAM:  Physical Exam  Constitutional:       Appearance: She is not diaphoretic.   HENT:      Head: Normocephalic and atraumatic.      Comments: No bony step-offs, no scalp hematomas noted.     Right Ear: External ear normal.      Left Ear: External ear normal.      Nose: Nose normal.      Mouth/Throat:      Mouth: Mucous membranes are moist.      Pharynx: Oropharynx is clear.      Comments: Dentition grossly intact. No malocclusion of the jaw noted.  Eyes:      Extraocular Movements: Extraocular movements intact.   Cardiovascular:      Rate and Rhythm: Normal rate.      Pulses: Normal pulses.   Pulmonary:      Effort: Pulmonary effort is normal. No respiratory distress.      Breath sounds: Normal breath sounds. No stridor. No wheezing.   Abdominal:      General: Abdomen is flat. There is no distension.      Palpations: Abdomen is soft.      Tenderness: There is no abdominal tenderness.   Musculoskeletal:         General: Tenderness present.      Cervical back: Normal range of motion. No tenderness.      Comments: R hip mildly tender to palpation.  Compartments are soft and compressible. LLE, bilateral upper extremities nontender. No obvious deformity or injury.   Skin:     General: Skin is warm and dry.   Neurological:      Mental Status: She is alert and oriented to person, place, and time.      Sensory: No sensory deficit.      Motor: No weakness.      Comments: Bilateral plantarflexion/dorsiflexion 5/5. Sensation to light touch grossly intact throughout to bilateral upper and lower extremities.  strength intact, 5/5 bilaterally.   Psychiatric:         Mood and Affect: Mood normal.         Behavior: Behavior normal.       IMAGING SUMMARY:   CT Head: no acute traumatic findings  CT C-Spine: no acute traumatic findings  CT Pelvis: findings as above  CXR/PXR: no acute cardiopulmonary process  R knee, R forearm XRs: no acute traumatic findings    LABS:  Results from last 7 days   Lab Units 12/10/24  1841   WBC AUTO x10*3/uL 16.2*   HEMOGLOBIN g/dL 13.2   HEMATOCRIT % 38.8   PLATELETS AUTO x10*3/uL 159   NEUTROS PCT AUTO % 84.6   LYMPHS PCT AUTO % 9.0   MONOS PCT AUTO % 5.6   EOS PCT AUTO % 0.2     Results from last 7 days   Lab Units 12/10/24  1841   APTT seconds 28   INR  1.1     Results from last 7 days   Lab Units 12/10/24  1841   SODIUM mmol/L 137   POTASSIUM mmol/L 3.8   CHLORIDE mmol/L 103   CO2 mmol/L 25   BUN mg/dL 16   CREATININE mg/dL 0.73   CALCIUM mg/dL 8.7   PROTEIN TOTAL g/dL 7.0   BILIRUBIN TOTAL mg/dL 0.5   ALK PHOS U/L 69   ALT U/L 17   AST U/L 25   GLUCOSE mg/dL 98     Results from last 7 days   Lab Units 12/10/24  1841   BILIRUBIN TOTAL mg/dL 0.5         I have reviewed all laboratory and imaging results ordered/pertinent for this encounter.

## 2024-12-11 NOTE — H&P
[facility] Department of Orthopaedic Surgery   Surgical History & Physical <30 Days    Reason for Surgery: R acetabulum fx  Planned Procedure:  R tab ORIF + MGIDALIA    History & Physical Reviewed:  I have reviewed the History and Physical for obtained within the last 30 days. Relevant findings and updates are noted below:  No significant changes.    Home medications were reviewed with significant updates noted below:  No significant changes.    ERAS patient?: No    COVID-19 Risk Consent:   Surgeon has reviewed the key risks related to jovana COVID-19 and subsequent sequelae.     12/11/24 at 1:35 AM - Elías Bonilla MD

## 2024-12-11 NOTE — ANESTHESIA PREPROCEDURE EVALUATION
Patient: Sue Carter    Procedure Information       Date/Time: 12/11/24 0952    Procedures:       Hip Acetabular Fracture ORIF Posterior (Right: Hip)      Arthroplasty Total Hip Posterior Approach (Right: Hip)    Location: [facility] OR 01 / [facility] OR    Surgeons: Bryan Benitez MD            Relevant Problems   Cardiac (within normal limits)      /Renal (within normal limits)      Endocrine (within normal limits)      Hematology   (+) Acute deep vein thrombosis (DVT) of left lower extremity (Multi)      Musculoskeletal (within normal limits)       Clinical information reviewed:   Tobacco  Allergies  Meds   Med Hx  Surg Hx  OB Status  Fam Hx  Soc   Hx        NPO Detail:  NPO/Void Status  Date of Last Liquid: 12/10/24  Time of Last Liquid: 2359  Date of Last Solid: 12/10/24  Time of Last Solid: 2359         Physical Exam    Airway  Mallampati: II  TM distance: >3 FB     Cardiovascular   Rhythm: regular  Rate: normal     Dental - normal exam     Pulmonary - normal exam  Breath sounds clear to auscultation     Abdominal - normal exam         Anesthesia Plan    History of general anesthesia?: yes  History of complications of general anesthesia?: no    ASA 2     general     The patient is not a current smoker.    intravenous induction   Postoperative administration of opioids is intended.  Trial extubation is planned.  Anesthetic plan and risks discussed with patient.    Plan discussed with CRNA.

## 2024-12-11 NOTE — PROGRESS NOTES
Occupational Therapy                 Therapy Communication Note    Patient Name: Sue Carter  MRN: [id number]  Department: [facility] [place] [facility]  Room: [facility]*  Today's Date: 12/11/2024     Discipline: Occupational Therapy    Missed Visit Reason: Missed Visit Reason: Patient placed on medical hold (Pt is on strict bedrest with plan for OR 12/11 for ORIF + MIGDALIA. Will re-attempt OT eval when medically appropiate and as schedule permits.)    Missed Time: Attempt

## 2024-12-12 ENCOUNTER — APPOINTMENT (OUTPATIENT)
Dept: RADIOLOGY | Facility: HOSPITAL | Age: 74
End: 2024-12-12
Payer: MEDICARE

## 2024-12-12 LAB
ALBUMIN SERPL BCP-MCNC: 3.6 G/DL (ref 3.4–5)
ANION GAP SERPL CALC-SCNC: 11 MMOL/L (ref 10–20)
BUN SERPL-MCNC: 27 MG/DL (ref 6–23)
CALCIUM SERPL-MCNC: 9.1 MG/DL (ref 8.6–10.6)
CHLORIDE SERPL-SCNC: 96 MMOL/L (ref 98–107)
CO2 SERPL-SCNC: 33 MMOL/L (ref 21–32)
CREAT SERPL-MCNC: 1.16 MG/DL (ref 0.5–1.05)
EGFRCR SERPLBLD CKD-EPI 2021: 49 ML/MIN/1.73M*2
GLUCOSE SERPL-MCNC: 104 MG/DL (ref 74–99)
MAGNESIUM SERPL-MCNC: 2.9 MG/DL (ref 1.6–2.4)
PHOSPHATE SERPL-MCNC: 2.7 MG/DL (ref 2.5–4.9)
POTASSIUM SERPL-SCNC: 3.9 MMOL/L (ref 3.5–5.3)
SODIUM SERPL-SCNC: 136 MMOL/L (ref 136–145)

## 2024-12-12 PROCEDURE — 2500000001 HC RX 250 WO HCPCS SELF ADMINISTERED DRUGS (ALT 637 FOR MEDICARE OP)

## 2024-12-12 PROCEDURE — 1200000002 HC GENERAL ROOM WITH TELEMETRY DAILY

## 2024-12-12 PROCEDURE — 2500000004 HC RX 250 GENERAL PHARMACY W/ HCPCS (ALT 636 FOR OP/ED): Mod: JZ

## 2024-12-12 PROCEDURE — 97535 SELF CARE MNGMENT TRAINING: CPT | Mod: GO

## 2024-12-12 PROCEDURE — 83735 ASSAY OF MAGNESIUM: CPT

## 2024-12-12 PROCEDURE — 2500000001 HC RX 250 WO HCPCS SELF ADMINISTERED DRUGS (ALT 637 FOR MEDICARE OP): Performed by: PHYSICIAN ASSISTANT

## 2024-12-12 PROCEDURE — 97530 THERAPEUTIC ACTIVITIES: CPT | Mod: GP

## 2024-12-12 PROCEDURE — 97162 PT EVAL MOD COMPLEX 30 MIN: CPT | Mod: GP

## 2024-12-12 PROCEDURE — 2500000004 HC RX 250 GENERAL PHARMACY W/ HCPCS (ALT 636 FOR OP/ED)

## 2024-12-12 PROCEDURE — 2500000005 HC RX 250 GENERAL PHARMACY W/O HCPCS

## 2024-12-12 PROCEDURE — 97165 OT EVAL LOW COMPLEX 30 MIN: CPT | Mod: GO

## 2024-12-12 PROCEDURE — 36415 COLL VENOUS BLD VENIPUNCTURE: CPT

## 2024-12-12 PROCEDURE — 80069 RENAL FUNCTION PANEL: CPT

## 2024-12-12 PROCEDURE — 72190 X-RAY EXAM OF PELVIS: CPT

## 2024-12-12 RX ORDER — METHOCARBAMOL 500 MG/1
500 TABLET, FILM COATED ORAL EVERY 8 HOURS SCHEDULED
Status: DISCONTINUED | OUTPATIENT
Start: 2024-12-12 | End: 2024-12-14 | Stop reason: HOSPADM

## 2024-12-12 RX ORDER — LIDOCAINE 560 MG/1
1 PATCH PERCUTANEOUS; TOPICAL; TRANSDERMAL DAILY
Status: DISCONTINUED | OUTPATIENT
Start: 2024-12-12 | End: 2024-12-14 | Stop reason: HOSPADM

## 2024-12-12 RX ORDER — HEPARIN SODIUM 5000 [USP'U]/ML
5000 INJECTION, SOLUTION INTRAVENOUS; SUBCUTANEOUS EVERY 8 HOURS
Status: DISCONTINUED | OUTPATIENT
Start: 2024-12-12 | End: 2024-12-14 | Stop reason: HOSPADM

## 2024-12-12 RX ORDER — SODIUM CHLORIDE, SODIUM LACTATE, POTASSIUM CHLORIDE, CALCIUM CHLORIDE 600; 310; 30; 20 MG/100ML; MG/100ML; MG/100ML; MG/100ML
75 INJECTION, SOLUTION INTRAVENOUS CONTINUOUS
Status: ACTIVE | OUTPATIENT
Start: 2024-12-12 | End: 2024-12-13

## 2024-12-12 RX ORDER — ONDANSETRON HYDROCHLORIDE 2 MG/ML
4 INJECTION, SOLUTION INTRAVENOUS EVERY 8 HOURS PRN
Status: DISCONTINUED | OUTPATIENT
Start: 2024-12-12 | End: 2024-12-14 | Stop reason: HOSPADM

## 2024-12-12 RX ORDER — IBUPROFEN 400 MG/1
200 TABLET ORAL EVERY 6 HOURS SCHEDULED
Status: DISCONTINUED | OUTPATIENT
Start: 2024-12-12 | End: 2024-12-14 | Stop reason: HOSPADM

## 2024-12-12 RX ORDER — PSYLLIUM HUSK 0.4 G
1 CAPSULE ORAL 2 TIMES DAILY
Qty: 180 TABLET | Refills: 0 | Status: SHIPPED | OUTPATIENT
Start: 2024-12-12 | End: 2025-03-12

## 2024-12-12 RX ORDER — ONDANSETRON 4 MG/1
4 TABLET, FILM COATED ORAL EVERY 8 HOURS PRN
Status: DISCONTINUED | OUTPATIENT
Start: 2024-12-12 | End: 2024-12-14 | Stop reason: HOSPADM

## 2024-12-12 RX ORDER — MAGNESIUM SULFATE HEPTAHYDRATE 40 MG/ML
2 INJECTION, SOLUTION INTRAVENOUS ONCE
Status: COMPLETED | OUTPATIENT
Start: 2024-12-12 | End: 2024-12-12

## 2024-12-12 SDOH — ECONOMIC STABILITY: GENERAL

## 2024-12-12 SDOH — ECONOMIC STABILITY: FOOD INSECURITY: WITHIN THE PAST 12 MONTHS, THE FOOD YOU BOUGHT JUST DIDN'T LAST AND YOU DIDN'T HAVE MONEY TO GET MORE.: NEVER TRUE

## 2024-12-12 SDOH — ECONOMIC STABILITY: FOOD INSECURITY: WITHIN THE PAST 12 MONTHS, YOU WORRIED THAT YOUR FOOD WOULD RUN OUT BEFORE YOU GOT MONEY TO BUY MORE.: NEVER TRUE

## 2024-12-12 SDOH — ECONOMIC STABILITY: TRANSPORTATION INSECURITY
IN THE PAST 12 MONTHS, HAS THE LACK OF TRANSPORTATION KEPT YOU FROM MEDICAL APPOINTMENTS OR FROM GETTING MEDICATIONS?: NO

## 2024-12-12 SDOH — ECONOMIC STABILITY: INCOME INSECURITY: IN THE LAST 12 MONTHS, WAS THERE A TIME WHEN YOU WERE NOT ABLE TO PAY THE MORTGAGE OR RENT ON TIME?: NO

## 2024-12-12 SDOH — ECONOMIC STABILITY: HOUSING INSECURITY

## 2024-12-12 SDOH — ECONOMIC STABILITY: HOUSING INSECURITY: IN THE LAST 12 MONTHS, WAS THERE A TIME WHEN YOU WERE NOT ABLE TO PAY THE MORTGAGE OR RENT ON TIME?: NO

## 2024-12-12 SDOH — ECONOMIC STABILITY: TRANSPORTATION INSECURITY
IN THE PAST 12 MONTHS, HAS LACK OF TRANSPORTATION KEPT YOU FROM MEETINGS, WORK, OR FROM GETTING THINGS NEEDED FOR DAILY LIVING?: NO

## 2024-12-12 SDOH — ECONOMIC STABILITY: HOUSING INSECURITY: IN THE LAST 12 MONTHS, HOW MANY PLACES HAVE YOU LIVED?: 1

## 2024-12-12 SDOH — ECONOMIC STABILITY: FOOD INSECURITY

## 2024-12-12 SDOH — ECONOMIC STABILITY: HOUSING INSECURITY: IN THE PAST 12 MONTHS HAS THE ELECTRIC, GAS, OIL, OR WATER COMPANY THREATENED TO SHUT OFF SERVICES IN YOUR HOME?: NO

## 2024-12-12 SDOH — ECONOMIC STABILITY: TRANSPORTATION INSECURITY

## 2024-12-12 SDOH — ECONOMIC STABILITY: TRANSPORTATION INSECURITY: IN THE PAST 12 MONTHS, HAS LACK OF TRANSPORTATION KEPT YOU FROM MEDICAL APPOINTMENTS OR FROM GETTING MEDICATIONS?: NO

## 2024-12-12 SDOH — ECONOMIC STABILITY: FOOD INSECURITY: WITHIN THE PAST 12 MONTHS, YOU WORRIED THAT YOUR FOOD WOULD RUN OUT BEFORE YOU GOT THE MONEY TO BUY MORE.: NEVER TRUE

## 2024-12-12 SDOH — ECONOMIC STABILITY: HOUSING INSECURITY
IN THE LAST 12 MONTHS, WAS THERE A TIME WHEN YOU DID NOT HAVE A STEADY PLACE TO SLEEP OR SLEPT IN A SHELTER (INCLUDING NOW)?: NO

## 2024-12-12 ASSESSMENT — COGNITIVE AND FUNCTIONAL STATUS - GENERAL
CLIMB 3 TO 5 STEPS WITH RAILING: TOTAL
CLIMB 3 TO 5 STEPS WITH RAILING: A LITTLE
MOVING FROM LYING ON BACK TO SITTING ON SIDE OF FLAT BED WITH BEDRAILS: A LITTLE
WALKING IN HOSPITAL ROOM: A LOT
DAILY ACTIVITIY SCORE: 15
MOBILITY SCORE: 15
DRESSING REGULAR UPPER BODY CLOTHING: A LITTLE
STANDING UP FROM CHAIR USING ARMS: A LITTLE
MOVING TO AND FROM BED TO CHAIR: A LITTLE
TURNING FROM BACK TO SIDE WHILE IN FLAT BAD: A LITTLE
TOILETING: A LITTLE
WALKING IN HOSPITAL ROOM: A LOT
DRESSING REGULAR LOWER BODY CLOTHING: TOTAL
DAILY ACTIVITIY SCORE: 18
MOBILITY SCORE: 17
DRESSING REGULAR LOWER BODY CLOTHING: A LOT
MOVING FROM LYING ON BACK TO SITTING ON SIDE OF FLAT BED WITH BEDRAILS: A LITTLE
DRESSING REGULAR UPPER BODY CLOTHING: A LOT
TOILETING: A LOT
MOVING FROM LYING ON BACK TO SITTING ON SIDE OF FLAT BED WITH BEDRAILS: A LITTLE
STANDING UP FROM CHAIR USING ARMS: A LITTLE
MOBILITY SCORE: 15
MOVING TO AND FROM BED TO CHAIR: A LOT
STANDING UP FROM CHAIR USING ARMS: A LITTLE
HELP NEEDED FOR BATHING: A LOT
TURNING FROM BACK TO SIDE WHILE IN FLAT BAD: A LITTLE
MOVING TO AND FROM BED TO CHAIR: A LITTLE
CLIMB 3 TO 5 STEPS WITH RAILING: TOTAL
TURNING FROM BACK TO SIDE WHILE IN FLAT BAD: A LITTLE
WALKING IN HOSPITAL ROOM: A LITTLE
HELP NEEDED FOR BATHING: A LOT

## 2024-12-12 ASSESSMENT — PAIN DESCRIPTION - LOCATION: LOCATION: LEG

## 2024-12-12 ASSESSMENT — PAIN SCALES - WONG BAKER: WONGBAKER_NUMERICALRESPONSE: HURTS LITTLE MORE

## 2024-12-12 ASSESSMENT — ACTIVITIES OF DAILY LIVING (ADL)
BATHING_ASSISTANCE: MODERATE
LACK_OF_TRANSPORTATION: NO
HOME_MANAGEMENT_TIME_ENTRY: 10
ADL_ASSISTANCE: INDEPENDENT
LACK_OF_TRANSPORTATION: NO

## 2024-12-12 ASSESSMENT — PAIN SCALES - GENERAL
PAINLEVEL_OUTOF10: 7
PAINLEVEL_OUTOF10: 6
PAINLEVEL_OUTOF10: 6
PAINLEVEL_OUTOF10: 2
PAINLEVEL_OUTOF10: 0 - NO PAIN
PAINLEVEL_OUTOF10: 2

## 2024-12-12 ASSESSMENT — PAIN - FUNCTIONAL ASSESSMENT
PAIN_FUNCTIONAL_ASSESSMENT: 0-10

## 2024-12-12 ASSESSMENT — PAIN DESCRIPTION - ORIENTATION: ORIENTATION: RIGHT

## 2024-12-12 ASSESSMENT — SOCIAL DETERMINANTS OF HEALTH (SDOH): IN THE PAST 12 MONTHS, HAS THE ELECTRIC, GAS, OIL, OR WATER COMPANY THREATENED TO SHUT OFF SERVICE IN YOUR HOME?: NO

## 2024-12-12 NOTE — PROGRESS NOTES
Physical Therapy    Physical Therapy Evaluation & Treatment    Patient Name: Sue Carter  MRN: [id number]  Department: [place] [address]  Room: [facility]8076-  Today's Date: 12/12/2024   Time Calculation  Start Time: 1206  Stop Time: 1236  Time Calculation (min): 30 min    Assessment/Plan   PT Assessment  PT Assessment Results: Decreased strength, Impaired balance, Decreased mobility, Orthopedic restrictions  Rehab Prognosis: Good  Physical Needs: Stair navigation into home limited by function/safety, High falls risk due to function or environment  End of Session Communication: Bedside nurse  Assessment Comment: Pt is a 74 y.o female s/p ORIF R acetabulum on 12/11/24 with Dr. Benitez.  Pt presents with decreased strength, balance, and mobility.  Pt would benefit from skilled PT to address the above deficits and increase independence with mobility.  Pt will benefit from high intensity PT upon discharge however if pt refuses, will require an additional PT session to complete stair training (due to stairs to enter home with current WB restrictions and posterior hip precautions) in addition to low intensity PT upon discharge.  End of Session Patient Position: Up in chair, Alarm on (friend in room)   IP OR SWING BED PT PLAN  Inpatient or Swing Bed: Inpatient  PT Plan  Treatment/Interventions: Bed mobility, Transfer training, Gait training, Stair training, Balance training, Neuromuscular re-education, Strengthening, Therapeutic exercise, Therapeutic activity, Home exercise program  PT Plan: Ongoing PT  PT Frequency: Daily  PT Discharge Recommendations: High intensity level of continued care  Equipment Recommended upon Discharge:  (TBD pending amb and stair training)  PT Recommended Transfer Status: Assist x1, Assistive device (RW)  PT - OK to Discharge:  (Yes if D/C to high intensity PT, no if D/C home)      Subjective     General Visit Information:  General  Reason for Referral: Fall with R PW tab fx, s/p ORIF R acetabulum on 12/11  with Dr. Benitez  Past Medical History Relevant to Rehab: Per chart, PMH includes Cerebral amyloid angiopathy, osteoporosis, bilateral dry macular degeneration, Hx DVT (2020)  Family/Caregiver Present: Yes (supportive friend present whom pt will be staying with after discharge)  Co-Treatment: OT  Co-Treatment Reason: to maximize safety with mobility and therapeutic potential  Prior to Session Communication: Bedside nurse  Patient Position Received: Bed, 3 rail up, Alarm off, not on at start of session  General Comment: Pt cleared for PT by RN.  Pt alert and agreeable to PT. +PIV, bynum  Home Living:  Home Living  Type of Home: House  Lives With: Friends  Home Adaptive Equipment: None  Home Layout: Two level, Able to live on main level with bedroom/bathroom  Home Access: Stairs to enter with rails  Entrance Stairs-Rails: Right  Entrance Stairs-Number of Steps: 3  Home Living Comments: Pt will be staying with friend upon discharge, the above info is for friend's home  Prior Level of Function:  Prior Function Per Pt/Caregiver Report  Level of [place]: Independent with ADLs and functional transfers, Independent with homemaking with ambulation  Leisure: golfing  Prior Function Comments: +driving, -falls except fall leading to this admission  Precautions:  Precautions  LE Weight Bearing Status: Flat-foot Weight Bearing (R LE)  Medical Precautions: Fall precautions  Post-Surgical Precautions: Right hip precautions (R posterior hip precautions)    Vital Signs (Past 2hrs)        Date/Time Vitals Session Patient Position Pulse Resp SpO2 BP MAP (mmHg)    12/12/24 1206 Pre PT  --  73  --  --  --  --     12/12/24 1220 During PT  --  96  --  --  --  --            Objective   Pain:  Pain Assessment  Pain Assessment: 0-10  0-10 (Numeric) Pain Score: 2  Pain Type: Surgical pain  Pain Location: Leg  Pain Orientation: Right, Proximal  Pain Interventions: Ambulation/increased activity, Repositioned, Rest  Response to  Interventions: Increase in pain (to 4/10)  Cognition:  Cognition  Overall Cognitive Status: Within Functional Limits  Orientation Level: Oriented X4    General Assessments:  Activity Tolerance  Endurance: Endurance does not limit participation in activity    Sensation  Light Touch: No apparent deficits    Strength  Strength Comments: L LE strength grossly 4+/5 except L hip flex 4/5, R LE strength NT due to FFWB restrictions  Coordination  Movements are Fluid and Coordinated: Yes    Postural Control  Postural Control: Within Functional Limits    Static Sitting Balance  Static Sitting-Balance Support: Bilateral upper extremity supported, Feet supported  Static Sitting-Level of Assistance: Close supervision  Dynamic Sitting Balance  Dynamic Sitting-Balance Support: Bilateral upper extremity supported, Feet supported  Dynamic Sitting-Level of Assistance: Close supervision  Dynamic Sitting-Balance:  (L LE strength testing)    Static Standing Balance  Static Standing-Balance Support: Bilateral upper extremity supported (RW)  Static Standing-Level of Assistance: Contact guard, Close supervision (CGA progressing to close supervision)  Dynamic Standing Balance  Dynamic Standing-Balance Support: Bilateral upper extremity supported (RW)  Dynamic Standing-Level of Assistance: Contact guard, Close supervision (CGA progressing to close supervision)  Functional Assessments:  Bed Mobility  Bed Mobility: Yes  Bed Mobility 1  Bed Mobility 1: Supine to sitting  Level of Assistance 1: Minimum assistance (for R LE management)  Bed Mobility Comments 1: HOB flat, use of bed rail    Transfers  Transfer: Yes  Transfer 1  Transfer From 1: Bed to  Transfer to 1: Stand  Technique 1: Sit to stand  Transfer Device 1: Walker  Transfer Level of Assistance 1: Moderate assistance (x1)  Trials/Comments 1: VCs for hand placement  Transfers 2  Transfer From 2: Chair with arms to  Transfer to 2: Stand  Technique 2: Sit to stand, Stand to sit  Transfer  Device 2: Walker  Transfer Level of Assistance 2: Contact guard  Trials/Comments 2: VCs for hand placement, controlled descent into chair    Ambulation/Gait Training  Ambulation/Gait Training Performed: Yes  Ambulation/Gait Training 1  Surface 1: Level tile  Device 1: Rolling walker  Assistance 1: Contact guard, Close supervision (CGA progressing to close supervision)  Quality of Gait 1: Narrow base of support, Decreased step length, Inconsistent stride length, Diminished heel strike (decreased paddy)  Comments/Distance (ft) 1: 10ft in room, maintained FFWB R LE    Stairs  Stairs: No  Treatments:  Therapeutic Activity  Therapeutic Activity Performed: Yes  Therapeutic Activity 1: transfers, short distance amb, increased time for pt cueing, pt edu on R hip precautions and reminders throughout session to maintain precautions  Outcome Measures:  [facility] Basic Mobility  Turning from your back to your side while in a flat bed without using bedrails: A little  Moving from lying on your back to sitting on the side of a flat bed without using bedrails: A little  Moving to and from bed to chair (including a wheelchair): A lot  Standing up from a chair using your arms (e.g. wheelchair or bedside chair): A little  To walk in hospital room: A little  Climbing 3-5 steps with railing: A little  Basic Mobility - Total Score: 17    Encounter Problems       Encounter Problems (Active)       Balance       Pt will maintain static/dynamic standing balance x4 minutes with LRAD and modified independence to demonstrate improved balance while maintaining FFWB R LE and R posteiror hip precautions.       Start:  12/12/24    Expected End:  12/26/24               Mobility       Pt will complete bed mobility independently with HOB flat and no use of bed rails while maintaining FFWB R LE and R posteiror hip precautions.       Start:  12/12/24    Expected End:  12/26/24            Pt will amb >50ft with LRAD and modified independence while  maintaining FFWB R LE and R posteiror hip precaution in prep for safe discharge home        Start:  12/12/24    Expected End:  12/26/24            Pt will a/descend 3 steps with R rail and LRAD with supervision in prep for safe home entry while maintaining FFWB R LE and R posteiror hip precautions.       Start:  12/12/24    Expected End:  12/26/24               PT Transfers       Pt will transfer sit to stand with LRAD and modified independence while maintaining FFWB R LE and R posteiror hip precautions in prep for out of bed mobility        Start:  12/12/24                   Education Documentation  Precautions, taught by Sena Lagunas PT at 12/12/2024  1:10 PM.  Learner: Patient  Readiness: Acceptance  Method: Explanation  Response: Verbalizes Understanding  Comment: FFWB R LE, R posterior hip precautions    Body Mechanics, taught by Sena Lagunas PT at 12/12/2024  1:10 PM.  Learner: Patient  Readiness: Acceptance  Method: Explanation  Response: Verbalizes Understanding  Comment: FFWB R LE, R posterior hip precautions    Home Exercise Program, taught by Sena Lagunas PT at 12/12/2024  1:10 PM.  Learner: Patient  Readiness: Acceptance  Method: Explanation  Response: Verbalizes Understanding  Comment: FFWB R LE, R posterior hip precautions    Mobility Training, taught by Sena Lagunas PT at 12/12/2024  1:10 PM.  Learner: Patient  Readiness: Acceptance  Method: Explanation  Response: Verbalizes Understanding  Comment: FFWB R LE, R posterior hip precautions    Education Comments  No comments found.

## 2024-12-12 NOTE — PROGRESS NOTES
12/12/24 1441   Discharge Planning   Living Arrangements Alone   Support Systems Friends/neighbors   Assistance Needed Patient independent in ADLs prior to admission/   Type of Residence Private residence   Number of Stairs to Enter Residence 1   Number of Stairs Within Residence 10   Do you have animals or pets at home? No   Who is requesting discharge planning? Provider   Home or Post Acute Services Post acute facilities (Rehab/SNF/etc)   Type of Post Acute Facility Services Rehab   Expected Discharge Disposition Rehab   Does the patient need discharge transport arranged? Yes   RoundTrip coordination needed? Yes   Has discharge transport been arranged? No   Financial Resource Strain   How hard is it for you to pay for the very basics like food, housing, medical care, and heating? Not hard   Housing Stability   In the last 12 months, was there a time when you were not able to pay the mortgage or rent on time? N   In the past 12 months, how many times have you moved where you were living? 0   At any time in the past 12 months, were you homeless or living in a shelter (including now)? N   Transportation Needs   In the past 12 months, has lack of transportation kept you from medical appointments or from getting medications? no   In the past 12 months, has lack of transportation kept you from meetings, work, or from getting things needed for daily living? No     Transitional Care Coordinator Note: TCC met with patient introduced self and role to complete assessment (see above) and discuss discharge planning. Patient confirmed demographics:    Address: [address] [place] [address] [place] [address]   Alternate/Emergency Contact: Radha Arsenioronda (friend) [phone]  Patient Contact: [phone]  DME: Denies use and/or ownership of DME   Homecare:  Denies active HC   Oxygen Use: Denies oxygen, bipap or cpap use   Diabetic:   Denies   Dialysis:  Denies   Falls:  1 Fall   PCP:  Yuval Gonzalez- last visit is 10/29  Pharmacy:  [facility]   Insurance: Medicare     Patient lives alone in two [place] home. Patient independent prior to admission. Per patient plan to move in with friend Radha ([address] [place] [id number]) post discharge. Transitional Care Coordinator Note: Patient discussed in morning rounds, per medical team (trauma) patient is not medically ready. Discharge dispo: Patient stated if needed wants [facility] as her friend has use agency in the past. Therapy team preparing to see patient for discharge dispo during TCC assessment.       Rubin Damon RN BSN   Transitional Care Coordinator

## 2024-12-12 NOTE — CARE PLAN
The patient's goals for the shift include      The clinical goals for the shift include pain control      Problem: Pain  Goal: Takes deep breaths with improved pain control throughout the shift  12/12/2024 1326 by Apurva Ramírez LPN  Outcome: Progressing  12/12/2024 1325 by Apurva Ramírez LPN  Outcome: Progressing  Goal: Turns in bed with improved pain control throughout the shift  12/12/2024 1326 by Apurva Ramírez LPN  Outcome: Progressing  12/12/2024 1325 by Apurva Ramírez LPN  Outcome: Progressing  Goal: Walks with improved pain control throughout the shift  12/12/2024 1326 by Apurva Ramírez LPN  Outcome: Progressing  12/12/2024 1325 by Apurva Ramírez LPN  Outcome: Progressing  Goal: Performs ADL's with improved pain control throughout shift  12/12/2024 1326 by Apurva Ramírez LPN  Outcome: Progressing  12/12/2024 1325 by Apurva Ramírez LPN  Outcome: Progressing  Goal: Participates in PT with improved pain control throughout the shift  12/12/2024 1326 by Apurva Ramírez LPN  Outcome: Progressing  12/12/2024 1325 by Auprva Ramírez LPN  Outcome: Progressing  Goal: Free from opioid side effects throughout the shift  12/12/2024 1326 by Apurva Ramírez LPN  Outcome: Progressing  12/12/2024 1325 by Apurva Ramírez LPN  Outcome: Progressing  Goal: Free from acute confusion related to pain meds throughout the shift  12/12/2024 1326 by Apurva Ramírez LPN  Outcome: Progressing  12/12/2024 1325 by Apurva Ramírez LPN  Outcome: Progressing     Problem: Skin  Goal: Decreased wound size/increased tissue granulation at next dressing change  12/12/2024 1326 by Apurva Ramírez LPN  Outcome: Progressing  Flowsheets (Taken 12/12/2024 1326)  Decreased wound size/increased tissue granulation at next dressing change:   Promote sleep for wound healing   Protective  dressings over bony prominences   Utilize specialty bed per algorithm  12/12/2024 1325 by Apurva Ramríez LPN  Outcome: Progressing  Goal: Participates in plan/prevention/treatment measures  12/12/2024 1326 by Apurva Ramírez LPN  Outcome: Progressing  Flowsheets (Taken 12/12/2024 1326)  Participates in plan/prevention/treatment measures:   Discuss with provider PT/OT consult   Elevate heels   Increase activity/out of bed for meals  12/12/2024 1325 by Apurva Ramírez LPN  Outcome: Progressing  Goal: Prevent/manage excess moisture  12/12/2024 1326 by Apurva Ramírez LPN  Outcome: Progressing  Flowsheets (Taken 12/12/2024 1326)  Prevent/manage excess moisture:   Cleanse incontinence/protect with barrier cream   Monitor for/manage infection if present   Use wicking fabric (obtain order)   Follow provider orders for dressing changes   Moisturize dry skin  12/12/2024 1325 by Apurva Ramírez LPN  Outcome: Progressing  Goal: Prevent/minimize sheer/friction injuries  12/12/2024 1326 by Apurva Ramírez LPN  Outcome: Progressing  Flowsheets (Taken 12/12/2024 1326)  Prevent/minimize sheer/friction injuries:   Complete micro-shifts as needed if patient unable. Adjust patient position to relieve pressure points, not a full turn   Increase activity/out of bed for meals   Use pull sheet   Utilize specialty bed per algorithm   Turn/reposition every 2 hours/use positioning/transfer devices   HOB 30 degrees or less  12/12/2024 1325 by Apurva Ramírez LPN  Outcome: Progressing  Goal: Promote/optimize nutrition  12/12/2024 1326 by Apurva Ramírez LPN  Outcome: Progressing  Flowsheets (Taken 12/12/2024 1326)  Promote/optimize nutrition:   Monitor/record intake including meals   Consume > 50% meals/supplements   Offer water/supplements/favorite foods   Reassess MST if dietician not consulted   Assist with feeding  12/12/2024 1325 by Apurva Pelayo  DAYANA Ramírez  Outcome: Progressing  Goal: Promote skin healing  12/12/2024 1326 by Apurva Ramírez LPN  Outcome: Progressing  Flowsheets (Taken 12/12/2024 1326)  Promote skin healing:   Assess skin/pad under line(s)/device(s)   Protective dressings over bony prominences   Turn/reposition every 2 hours/use positioning/transfer devices   Ensure correct size (line/device) and apply per  instructions   Rotate device position/do not position patient on device  12/12/2024 1325 by pAurva Ramírez LPN  Outcome: Progressing      This document is complete and the patient is ready for discharge.

## 2024-12-12 NOTE — DISCHARGE INSTRUCTIONS
Orthopaedic Surgery Discharge Instructions:  Follow-Up Instructions  You will need to be seen in clinic by Dr. Benitez in 2 weeks for a post-operative evaluation.    You will need to call and schedule an appointment, unless there is a previous appointment that appears on your discharge instructions.  The direct orthopaedic clinic appointment line phone number is [phone].  Please do not delay in calling to make this appointment.    You should also follow up with your primary care provider in 1-2 weeks.    Activity Restrictions  1) No driving until further instructed by your orthopaedic physician, which will be addressed at your outpatient appointments.    2) No driving or operating heavy machinery while taking narcotic pain medication.    3) Weight bearing status --> flat foot weight bearing right lower extremity, posterior hip precautions.     Discharge Medications  You have been sent home with the following home medications: Oxycodone, Colace, and Aspirin.  Please wean yourself off the oxycodone, as tolerated. A good time to take the medication is before physical therapy sessions and bedtime. Colace is a stool softener to reduce the narcotic pain medications cause. Take it twice a day while taking narcotic pain medication to ensure you maintain your regular bowel movement frequency. Baby aspirin is taken twice daily to help reduce your risk of blood clots.    You should also take tylenol 650mg every 6 hours as needed to reduce the amount of oxycodone you need for pain.    Wound care instructions:   1) Leave operative dressing in place until POD7 (12/19/24). Then remove and leave incision open to air. Let water run freely over incision when showering, do not scrub. Do not soak in pool or tub.    2) Call if any drainage after 7 days, increased redness/warmth/swelling at incision site, abnormal pain/tenderness of the extremity, abnormal swelling of the extremity that does not respond to elevation, SOB/chest pain.

## 2024-12-12 NOTE — PROGRESS NOTES
Occupational Therapy    Evaluation and Treatment    Patient Name: Sue Carter  MRN: [id number]  Today's Date: 12/12/2024  [facility]  Time Calculation  Start Time: 1206  Stop Time: 1236  Time Calculation (min): 30 min    Assessment  IP OT Assessment  OT Assessment: Decreased ADLs, IADLs, functional mobility & transfers.  Prognosis: Excellent  Barriers to Discharge Home: No anticipated barriers  Evaluation/Treatment Tolerance: Patient tolerated treatment well  Medical Staff Made Aware: Yes  End of Session Communication: Bedside nurse  End of Session Patient Position: Up in chair, Alarm on  Plan:  Inpatient Plan  Treatment Interventions: ADL retraining, Functional transfer training, Endurance training, Patient/family training, Equipment evaluation/education, Compensatory technique education  OT Discharge Recommendations: High intensity level of continued care (May progress to low)  Equipment Recommended upon Discharge: Wheeled walker (Hip kit)  OT Recommended Transfer Status: Assistive equipment (Comment), Assist of 1 (FWW)  OT - OK to Discharge: Yes  OT Assessment  OT Assessment Results: Decreased ADL status, Decreased safe judgment during ADL, Decreased functional mobility, Decreased IADLs, Decreased gross motor control  Prognosis: Excellent  Evaluation/Treatment Tolerance: Patient tolerated treatment well  Medical Staff Made Aware: Yes  Strengths: Ability to acquire knowledge, Support of Caregivers, Premorbid level of function  Barriers to Participation:  (none)    Subjective   Current Problem:  1. Closed displaced fracture of right acetabulum, unspecified portion of acetabulum, initial encounter (Multi)  Case Request Operating Room: Hip Acetabular Fracture ORIF Posterior, Arthroplasty Total Hip Posterior Approach    Case Request Operating Room: Hip Acetabular Fracture ORIF Posterior, Arthroplasty Total Hip Posterior Approach    calcium carbonate-vitamin D3 500 mg-5 mcg (200 unit) tablet        General:  Reason  for Referral: Fall with R PW tab fx, s/p ORIF R acetabulum on 12/11 with Dr. Benitez  Past Medical History Relevant to Rehab: Cerebral amyloid angiopathy, osteoporosis, bilateral dry macular degeneration, Hx DVT (2020)  Co-Treatment: PT  Co-Treatment Reason: cotx for safety with functional mobility/transfers  Prior to Session Communication: Bedside nurse  Patient Position Received: Bed, 3 rail up, Alarm off, not on at start of session  Family/Caregiver Present: Yes (friend present & very supportive throuhgout session)  General Comment: Pt pleasant & receptive to therapy.   Precautions:  LE Weight Bearing Status: Flat-foot Weight Bearing (R LE)  Medical Precautions: Fall precautions  Post-Surgical Precautions: Right hip precautions  Vital Signs:  Vital Signs (Past 2hrs)        Date/Time Vitals Session Patient Position Pulse Resp SpO2 BP MAP (mmHg)    12/12/24 1445 --  --  --  --  --  110/68  --                   Pain:  Pain Assessment  Pain Assessment: 0-10  0-10 (Numeric) Pain Score: 2  Pain Type: Surgical pain  Pain Location: Leg  Pain Orientation: Right  Pain Interventions: Ambulation/increased activity, Repositioned    Objective   Cognition:  Overall Cognitive Status: Within Functional Limits  Orientation Level: Oriented X4  Insight: Mild (cues to adhere to hip precuations post education)     Home Living:  Type of Home: House  Lives With: Friends (Pt plans to d/c to friends home. Home set up information for friends home.)  Home Adaptive Equipment: None  Home Layout: Two level, Able to live on main level with bedroom/bathroom  Home Access: Stairs to enter with rails  Entrance Stairs-Rails: Right  Entrance Stairs-Number of Steps: 3  Bathroom Shower/Tub: Walk-in shower  Bathroom Toilet: Handicapped height  Bathroom Equipment: Built-in shower seat   Prior Function:  Level of [place]: Independent with ADLs and functional transfers, Independent with homemaking with ambulation  Receives Help From: Friends  ADL  Assistance: Independent  Homemaking Assistance: Independent  Ambulatory Assistance: Independent  Vocational: Retired  Leisure: golfing  Hand Dominance: Right  Prior Function Comments: +driving, -falls except fall leading to this admission    ADL:  Eating Assistance: Independent  Grooming Assistance: Independent (anticipated)  Bathing Assistance: Moderate (anticipated)  UE Dressing Assistance: Stand by  UE Dressing Deficit: Setup  LE Dressing Assistance: Moderate  LE Dressing Deficit: Don/doff R sock, Don/doff L sock  Toileting Assistance with Device: Minimal (anticipated)  Activity Tolerance:  Endurance: Endurance does not limit participation in activity  Balance:  Dynamic Sitting Balance  Dynamic Sitting-Balance Support: No upper extremity supported, Feet supported  Dynamic Sitting-Level of Assistance: Close supervision  Dynamic Standing Balance  Dynamic Standing-Balance Support: Bilateral upper extremity supported  Dynamic Standing-Level of Assistance: Contact guard  Static Sitting Balance  Static Sitting-Balance Support: No upper extremity supported, Feet supported  Static Sitting-Level of Assistance: Independent  Static Standing Balance  Static Standing-Balance Support: Bilateral upper extremity supported  Static Standing-Level of Assistance: Contact guard  Bed Mobility/Transfers: Bed Mobility/Transfers: Bed Mobility  Bed Mobility: Yes  Bed Mobility 1  Bed Mobility 1: Supine to sitting  Level of Assistance 1: Minimum assistance, Minimal verbal cues  Bed Mobility Comments 1: HOB flat, cued for technique. Assist at R LE.  Bed Mobility 2  Bed Mobility  2: Scooting (fwd seated at EOB)  Level of Assistance 2: Close supervision  Functional Mobility  Functional Mobility Performed: Yes  Functional Mobility 1  Surface 1: Level tile  Device 1: Rolling walker  Assistance 1: Contact guard, Minimal verbal cues  Comments 1: x min householf distance within room. Cued for safe body mechanics when turning & WB'ing.   and  Transfers  Transfer: Yes  Transfer 1  Transfer From 1: Bed to  Transfer to 1: Stand  Technique 1: Sit to stand  Transfer Device 1: Walker  Transfer Level of Assistance 1: Moderate assistance, Minimal verbal cues  Trials/Comments 1: x1 from EOB. Cued for technique, R LE positioning.  Transfers 2  Transfer From 2: Chair with arms to  Transfer to 2: Stand  Technique 2: Sit to stand  Transfer Device 2: Walker  Transfer Level of Assistance 2: Contact guard  Trials/Comments 2: cued for hand placement and R LE positioning  Transfers 3  Transfer From 3: Stand to  Transfer to 3: Bed, Chair with arms  Technique 3: Stand to sit  Transfer Device 3: Walker  Transfer Level of Assistance 3: Contact guard  Trials/Comments 3: cued for R LE extension  Transfers 4  Transfer From 4: Bed to  Transfer to 4: Chair without arms  Technique 4: Stand pivot  Transfer Device 4: Walker  Transfer Level of Assistance 4: Contact guard    Vision: Vision - Basic Assessment  Current Vision: Wears glasses only for reading   and    Sensation:  Light Touch: No apparent deficits  Strength:  Strength Comments: B UEs WFL  Perception:     Coordination:  Movements are Fluid and Coordinated: Yes   Hand Function:  Hand Function  Gross Grasp: Functional  Coordination: Functional  Extremities:   RUE   RUE : Within Functional Limits, LUE   LUE: Within Functional Limits,     Outcome Measures: [facility] Daily Activity  Putting on and taking off regular lower body clothing: A lot  Bathing (including washing, rinsing, drying): A lot  Putting on and taking off regular upper body clothing: A little  Toileting, which includes using toilet, bedpan or urinal: A little  Taking care of personal grooming such as brushing teeth: None  Eating Meals: None  Daily Activity - Total Score: 18  Brief Confusion Assessment Method (bCAM)  CAM Result: CAM -      ,          Education Documentation  Body Mechanics, taught by Diana Rayo OT at 12/12/2024  4:34 PM.  Learner:  Patient  Readiness: Acceptance  Method: Explanation, Demonstration  Response: Verbalizes Understanding  Comment: OT POC, hip precautions and WB restrictions, AE for LB dressing, fall prevention    Precautions, taught by Diana Rayo OT at 12/12/2024  4:34 PM.  Learner: Patient  Readiness: Acceptance  Method: Explanation, Demonstration  Response: Verbalizes Understanding  Comment: OT POC, hip precautions and WB restrictions, AE for LB dressing, fall prevention    ADL Training, taught by Diana Rayo OT at 12/12/2024  4:34 PM.  Learner: Patient  Readiness: Acceptance  Method: Explanation, Demonstration  Response: Verbalizes Understanding  Comment: OT POC, hip precautions and WB restrictions, AE for LB dressing, fall prevention    Education Comments  No comments found.        Goals:   Encounter Problems       Encounter Problems (Active)       ADLs       Patient will perform LB bathing with modified independent level of assistance and long-handled sponge. (Progressing)       Start:  12/12/24    Expected End:  12/26/24            Patient with complete lower body dressing with modified independent level of assistance donning and doffing all LE clothes  with PRN adaptive equipment. (Progressing)       Start:  12/12/24    Expected End:  12/26/24            Patient will complete toileting including hygiene clothing management/hygiene with modified independent level of assistance. (Progressing)       Start:  12/12/24    Expected End:  12/26/24               COGNITION/SAFETY       Patient will recall and adhere to weight bearing and posterior hip precautions with all ADL and functional mobility in order to promote healing and safety with functional tasks (Progressing)       Start:  12/12/24    Expected End:  12/26/24               MOBILITY       Patient will perform Functional mobility mod  Household distances/Community Distances with modified independent level of assistance and least restrictive device in order to  improve safety and functional mobility. (Progressing)       Start:  12/12/24    Expected End:  12/26/24               TRANSFERS       Patient will perform bed mobility modified independent level of assistance and bed rails in order to improve safety and independence with mobility (Progressing)       Start:  12/12/24    Expected End:  12/26/24            Patient will complete functional transfer to chair/toilet with least restrictive device with modified independent level of assistance. (Progressing)       Start:  12/12/24    Expected End:  12/26/24                   Treatment Completed on Evaluation    Activities of Daily Living:      LE Dressing  LE Dressing: Yes  LE Dressing Adaptive Equipment: Reacher, Sock aide  Sock Level of Assistance: Contact guard, Minimal verbal cues  LE Dressing Where Assessed: Chair  LE Dressing Comments: educated Pt on AE to increase ease & ind with LB dressing. Pt receptive to AE training. Returns demo with min cues for AE management & hip precautions.          12/12/24 at 4:35 PM   Diana Rayo OT   Rehab Office: [phone]

## 2024-12-12 NOTE — CARE PLAN
The patient's goals for the shift include      The clinical goals for the shift include pain control      Problem: Pain  Goal: Takes deep breaths with improved pain control throughout the shift  Outcome: Progressing  Goal: Turns in bed with improved pain control throughout the shift  Outcome: Progressing  Goal: Walks with improved pain control throughout the shift  Outcome: Progressing  Goal: Performs ADL's with improved pain control throughout shift  Outcome: Progressing  Goal: Participates in PT with improved pain control throughout the shift  Outcome: Progressing  Goal: Free from opioid side effects throughout the shift  Outcome: Progressing  Goal: Free from acute confusion related to pain meds throughout the shift  Outcome: Progressing     Problem: Skin  Goal: Decreased wound size/increased tissue granulation at next dressing change  Outcome: Progressing  Goal: Participates in plan/prevention/treatment measures  Outcome: Progressing  Goal: Prevent/manage excess moisture  Outcome: Progressing  Goal: Prevent/minimize sheer/friction injuries  Outcome: Progressing  Goal: Promote/optimize nutrition  Outcome: Progressing  Goal: Promote skin healing  Outcome: Progressing

## 2024-12-12 NOTE — PROGRESS NOTES
"Orthopaedic Surgery Progress Note    Subjective:  No acute events overnight. Pain well controlled. Denies chest pain, shortness of breath, or fevers.    Objective:  /64 (BP Location: Left arm, Patient Position: Lying)   Pulse 72   Temp 35.9 °C (96.7 °F) (Temporal)   Resp 16   Ht 1.676 m (5' 5.98\")   Wt 68 kg (150 lb)   SpO2 94%   BMI 24.22 kg/m²     Gen: arousable, NAD, appropriately conversational  Cardiac: RRR to peripheral palpation  Resp: nonlabored on RA  GI: soft, nondistended    MSK:  RLE:   - Dressing c/d/i  - Motor intact in DF/PF/EHL/FHL  - SILT in saph/sural/SPN/DPN distributions  - Foot wwp, 2+ DP/PT pulse, brisk cap refill  - Compartments soft and compressible    Full tertiary exam was performed without pain or ROM limitations in any other bone or joint other than what is mentioned above.     Results for orders placed or performed during the hospital encounter of 12/10/24 (from the past 24 hours)   CBC   Result Value Ref Range    WBC 17.0 (H) 4.4 - 11.3 x10*3/uL    nRBC 0.0 0.0 - 0.0 /100 WBCs    RBC 4.04 4.00 - 5.20 x10*6/uL    Hemoglobin 12.1 12.0 - 16.0 g/dL    Hematocrit 37.5 36.0 - 46.0 %    MCV 93 80 - 100 fL    MCH 30.0 26.0 - 34.0 pg    MCHC 32.3 32.0 - 36.0 g/dL    RDW 12.7 11.5 - 14.5 %    Platelets 179 150 - 450 x10*3/uL       XR pelvis 3+ views   Final Result   Similar-appearing right posterior acetabular fracture.             MACRO:   None        Signed by: Michael Douglas 12/11/2024 2:33 AM   Dictation workstation:   [id number]      XR pelvis 1-2 views   Final Result   1. Redemonstration of right posterior wall acetabular comminuted   fracture.   2. The previously noted impaction fracture of the right femoral head   is not well visualized on current exam.        I personally reviewed the images/study and I agree with the findings   as stated by Dr. Jeramy Dolan. This study was interpreted at   [facility], [place], [place].        MACRO:   None "        Signed by: Michael Douglas 12/11/2024 1:13 AM   Dictation workstation:   [id number]      FL fluoro images no charge    (Results Pending)       Assessment/Plan: 74 y.o. female s/p ORIF R acetabulum on 12/11 with Dr. Benitez.     - Weightbearing: FFWB, Posterior Hip Precautions   - DVT PPx: SCDs, DVT chemoppx per primary, however recommend at least 4 weeks of DVT chemoprophylaxis  - Pain: Multimodal pain regimen per primary  - Antibiotics: Ancef 2g q8hr x 3 doses  - Dressing: Mepilex remove POD7  - Drain: None  - PT/OT consult  - 5 view pelvis: pending    Orthopaedics will follow peripherally while patient is in house. Recommend:  - Pt will need to be FFWB, Posterior Hip Precautions until first follow up appointment.   - Patient can be continued on normal anticoagulation regimen from an orthopedics perspective, recommend at least 4 weeks  - Dressing can be removed in 7 days. Can shower after that.  - We recommend discharge with calcium(as carbonate)-VitD 600mg-400IU (10mcg) BID x30d  - Patient should follow up w/ Dr. Benitez in 2 weeks after discharge for post-operative appointment (patient may call [phone] to schedule).     Please page with questions     Evelio Connors MD  Orthopedic Surgery PGY-1  [facility]  Available by Epic Chat    While admitted, this patient will be followed by the Ortho Trauma Team. Please contact below residents with any questions (available via Epic Chat).     First call: Evelio Connors/Dominga Mosqueda, PGY-1  Second call: Gunner Salcedo, PGY-2  Third call: Robert Garcia, PGY-3    From 6pm-7am, weekends, holidays, and if no answer and there is an emergent issue, please page orthopaedic consult pager, [phone].

## 2024-12-12 NOTE — PROGRESS NOTES
"[facility]  TRAUMA SERVICE - PROGRESS NOTE    Patient Name: Sue Carter  MRN: [id number]  Admit Date: 1210  : 1950  AGE: 74 y.o.   GENDER: female  ==============================================================================  MECHANISM OF INJURY:   74 year-old female presents as a trauma consult, transferred from [facility], s/p mechanical ground level fall. Patient states she was walking and her \"legs gave out\". She denies dizziness, lightheadedness prior to the fall. She fell, landing on her right side. Patient unable to ambulate after the fall. Imaging obtained at outside hospital shows comminuted fracture of right acetabulum. She denies blood thinner use, head strike, and loss of consciousness. She endorses right hip pain and mild nausea at time of exam.      LOC (yes/no?): no  Anticoagulant / Anti-platelet Rx? (for what dx?): denies  Referring Facility Name (N/A for scene EMR run): [facility]     INJURIES:   Comminuted R acetabular fx with posterior subluxation of R femoral head     OTHER MEDICAL PROBLEMS:  Hx cerebral amyloid angiopathy  Hx DVT () not on anticoagulation  Hx osteoporosis     INCIDENTAL FINDINGS:  Right temporal lobe encephalomalacia.  Diffuse nonspecific white matter changes - likely sequela of small-vessel ischemic disease        PROCEDURES:   R acetabulum ORIF    ==============================================================================  TODAY'S ASSESSMENT AND PLAN OF CARE:    Neuro:  -MMP: advance as tolerated   Scheduled: Tylenol, Robaxin, Lido patch, Ibuprofen   PRN: Oxy 2.5/5 q4  -Melatonin    CV:  -No needs    Pulm:  -No needs, wean O2 as tolerated    GI:   -Regular diet,   -Zofran PRN  -Multivit    : JEFFY, baseline Cr 1.09 ()  -Ibarra out  1330  -TOV  -mIVF given JEFFY    Endo: No needs    ID: completed periop ancef    Heme/PPX: Baseline Hb 12-13  -Prior SAH, has IVC filter ()  -Monitor CBC as appropriate  -SQH TID given JEFFY, " patient refusing despite education  -Ensure SCD given hesitancy with pharmacologics    Serum creatinine: 1.16 mg/dL (H) 12/12/24 1151  Estimated creatinine clearance: 39.2 mL/min (A)    Dispo: Pending pain control, working with PTOT - AR, JEFFY resolution.    Discussed with Dr. Rola Sanderson MD  General Surgery: PGY-1  Trauma Surgery Pager: [phone]        ==============================================================================  CHIEF COMPLAINT / OVERNIGHT EVENTS:   NAEON. . Refusing dvt ppx d/t prior SAH when anticoagulated. No pain when she is still, pain when she moves. Hesitant on dvt ppx resumption despite education. Needs PTOT    MEDICAL HISTORY / ROS:  Admission history and ROS reviewed. Pertinent changes as follows:      PHYSICAL EXAM:  Heart Rate:  [56-96]   Temp:  [35.6 °C (96.1 °F)-36.3 °C (97.3 °F)]   Resp:  [8-24]   BP: ()/(51-75)   SpO2:  [91 %-100 %]   Physical Exam  Constitutional:       General: She is not in acute distress.     Appearance: Normal appearance. She is normal weight. She is not ill-appearing.   HENT:      Head: Normocephalic and atraumatic.      Nose: Nose normal.      Mouth/Throat:      Pharynx: Oropharynx is clear.   Eyes:      General:         Right eye: No discharge.         Left eye: No discharge.   Cardiovascular:      Rate and Rhythm: Normal rate.      Pulses: Normal pulses.   Pulmonary:      Effort: Pulmonary effort is normal. No respiratory distress.      Breath sounds: No wheezing.   Abdominal:      General: Abdomen is flat. There is no distension.      Tenderness: There is no abdominal tenderness.   Genitourinary:     Comments: Stacey, candelaria later    Musculoskeletal:         General: No swelling or tenderness (appropriate).      Cervical back: Normal range of motion.      Comments: Good R DP, soft, swollen compartments   Skin:     General: Skin is warm and dry.      Capillary Refill: Capillary refill takes less than 2 seconds.      Coloration:  Skin is not jaundiced or pale.   Neurological:      General: No focal deficit present.      Mental Status: She is alert and oriented to person, place, and time. Mental status is at baseline.      Sensory: No sensory deficit.      Motor: Weakness (postoperative, appropriate) present.      Comments: 3/5 strength RLE, sensation intact   Psychiatric:         Mood and Affect: Mood normal.         Behavior: Behavior normal.               IMAGING SUMMARY:  (summary of new imaging findings, not a copy of dictation)  12/12 pelvic XR: no acute abnormalities    LABS:  Results from last 7 days   Lab Units 12/11/24  1724 12/10/24  1841   WBC AUTO x10*3/uL 17.0* 16.2*   HEMOGLOBIN g/dL 12.1 13.2   HEMATOCRIT % 37.5 38.8   PLATELETS AUTO x10*3/uL 179 159   NEUTROS PCT AUTO %  --  84.6   LYMPHS PCT AUTO %  --  9.0   MONOS PCT AUTO %  --  5.6   EOS PCT AUTO %  --  0.2     Results from last 7 days   Lab Units 12/10/24  1841   APTT seconds 28   INR  1.1     Results from last 7 days   Lab Units 12/12/24  1151 12/10/24  1841   SODIUM mmol/L 136 137   POTASSIUM mmol/L 3.9 3.8   CHLORIDE mmol/L 96* 103   CO2 mmol/L 33* 25   BUN mg/dL 27* 16   CREATININE mg/dL 1.16* 0.73   CALCIUM mg/dL 9.1 8.7   PROTEIN TOTAL g/dL  --  7.0   BILIRUBIN TOTAL mg/dL  --  0.5   ALK PHOS U/L  --  69   ALT U/L  --  17   AST U/L  --  25   GLUCOSE mg/dL 104* 98     Results from last 7 days   Lab Units 12/10/24  1841   BILIRUBIN TOTAL mg/dL 0.5           I have reviewed all medications, laboratory results, and imaging pertinent for today's encounter.

## 2024-12-12 NOTE — CARE PLAN
The patient's goals for the shift include      The clinical goals for the shift include comfort and pain control

## 2024-12-12 NOTE — PROGRESS NOTES
LSW visited to patient to inquire about AR choices. The patient reported that she had already informed someone that she is not going to any facility for physical therapy. This LSW presented that information in his evening sign out.

## 2024-12-13 LAB
ALBUMIN SERPL BCP-MCNC: 3.6 G/DL (ref 3.4–5)
ANION GAP SERPL CALC-SCNC: 15 MMOL/L (ref 10–20)
BUN SERPL-MCNC: 22 MG/DL (ref 6–23)
CALCIUM SERPL-MCNC: 9.1 MG/DL (ref 8.6–10.6)
CHLORIDE SERPL-SCNC: 100 MMOL/L (ref 98–107)
CO2 SERPL-SCNC: 30 MMOL/L (ref 21–32)
CREAT SERPL-MCNC: 0.85 MG/DL (ref 0.5–1.05)
EGFRCR SERPLBLD CKD-EPI 2021: 72 ML/MIN/1.73M*2
GLUCOSE SERPL-MCNC: 92 MG/DL (ref 74–99)
MAGNESIUM SERPL-MCNC: 2.05 MG/DL (ref 1.6–2.4)
PHOSPHATE SERPL-MCNC: 2.7 MG/DL (ref 2.5–4.9)
POTASSIUM SERPL-SCNC: 4 MMOL/L (ref 3.5–5.3)
SODIUM SERPL-SCNC: 141 MMOL/L (ref 136–145)

## 2024-12-13 PROCEDURE — 1200000002 HC GENERAL ROOM WITH TELEMETRY DAILY

## 2024-12-13 PROCEDURE — 97110 THERAPEUTIC EXERCISES: CPT | Mod: GP

## 2024-12-13 PROCEDURE — 97116 GAIT TRAINING THERAPY: CPT | Mod: GP

## 2024-12-13 PROCEDURE — 2500000004 HC RX 250 GENERAL PHARMACY W/ HCPCS (ALT 636 FOR OP/ED)

## 2024-12-13 PROCEDURE — 80069 RENAL FUNCTION PANEL: CPT

## 2024-12-13 PROCEDURE — 2500000001 HC RX 250 WO HCPCS SELF ADMINISTERED DRUGS (ALT 637 FOR MEDICARE OP): Performed by: PHYSICIAN ASSISTANT

## 2024-12-13 PROCEDURE — 36415 COLL VENOUS BLD VENIPUNCTURE: CPT

## 2024-12-13 PROCEDURE — 2500000001 HC RX 250 WO HCPCS SELF ADMINISTERED DRUGS (ALT 637 FOR MEDICARE OP)

## 2024-12-13 PROCEDURE — 97530 THERAPEUTIC ACTIVITIES: CPT | Mod: GP

## 2024-12-13 PROCEDURE — 2500000005 HC RX 250 GENERAL PHARMACY W/O HCPCS

## 2024-12-13 PROCEDURE — 83735 ASSAY OF MAGNESIUM: CPT

## 2024-12-13 RX ORDER — SYRING-NEEDL,DISP,INSUL,0.3 ML 29 G X1/2"
296 SYRINGE, EMPTY DISPOSABLE MISCELLANEOUS ONCE
Status: COMPLETED | OUTPATIENT
Start: 2024-12-13 | End: 2024-12-13

## 2024-12-13 RX ORDER — SODIUM CHLORIDE, SODIUM LACTATE, POTASSIUM CHLORIDE, CALCIUM CHLORIDE 600; 310; 30; 20 MG/100ML; MG/100ML; MG/100ML; MG/100ML
75 INJECTION, SOLUTION INTRAVENOUS CONTINUOUS
Status: DISCONTINUED | OUTPATIENT
Start: 2024-12-13 | End: 2024-12-13

## 2024-12-13 RX ORDER — POLYETHYLENE GLYCOL 3350 17 G/17G
17 POWDER, FOR SOLUTION ORAL 2 TIMES DAILY
Status: DISCONTINUED | OUTPATIENT
Start: 2024-12-13 | End: 2024-12-14 | Stop reason: HOSPADM

## 2024-12-13 ASSESSMENT — COGNITIVE AND FUNCTIONAL STATUS - GENERAL
TOILETING: A LOT
CLIMB 3 TO 5 STEPS WITH RAILING: A LITTLE
TURNING FROM BACK TO SIDE WHILE IN FLAT BAD: A LITTLE
MOVING FROM LYING ON BACK TO SITTING ON SIDE OF FLAT BED WITH BEDRAILS: A LITTLE
CLIMB 3 TO 5 STEPS WITH RAILING: A LOT
MOVING TO AND FROM BED TO CHAIR: A LITTLE
WALKING IN HOSPITAL ROOM: A LITTLE
MOBILITY SCORE: 17
STANDING UP FROM CHAIR USING ARMS: A LITTLE
TURNING FROM BACK TO SIDE WHILE IN FLAT BAD: A LITTLE
WALKING IN HOSPITAL ROOM: A LITTLE
STANDING UP FROM CHAIR USING ARMS: A LITTLE
DRESSING REGULAR UPPER BODY CLOTHING: A LOT
WALKING IN HOSPITAL ROOM: A LITTLE
CLIMB 3 TO 5 STEPS WITH RAILING: A LOT
STANDING UP FROM CHAIR USING ARMS: A LITTLE
HELP NEEDED FOR BATHING: A LOT
DRESSING REGULAR LOWER BODY CLOTHING: TOTAL
DAILY ACTIVITIY SCORE: 15
MOBILITY SCORE: 17
TURNING FROM BACK TO SIDE WHILE IN FLAT BAD: A LITTLE
MOVING TO AND FROM BED TO CHAIR: A LITTLE
MOVING TO AND FROM BED TO CHAIR: A LITTLE
MOBILITY SCORE: 18

## 2024-12-13 ASSESSMENT — PAIN - FUNCTIONAL ASSESSMENT: PAIN_FUNCTIONAL_ASSESSMENT: 0-10

## 2024-12-13 ASSESSMENT — PAIN SCALES - GENERAL
PAINLEVEL_OUTOF10: 0 - NO PAIN
PAINLEVEL_OUTOF10: 3

## 2024-12-13 NOTE — CARE PLAN
The patient's goals for the shift include  pain control and comfort     The clinical goals for the shift include pain control      Problem: Pain  Goal: Takes deep breaths with improved pain control throughout the shift  Outcome: Progressing  Goal: Turns in bed with improved pain control throughout the shift  Outcome: Progressing  Goal: Walks with improved pain control throughout the shift  Outcome: Progressing  Goal: Performs ADL's with improved pain control throughout shift  Outcome: Progressing  Goal: Participates in PT with improved pain control throughout the shift  Outcome: Progressing  Goal: Free from opioid side effects throughout the shift  Outcome: Progressing  Goal: Free from acute confusion related to pain meds throughout the shift  Outcome: Progressing     Problem: Skin  Goal: Decreased wound size/increased tissue granulation at next dressing change  Outcome: Progressing  Goal: Participates in plan/prevention/treatment measures  Outcome: Progressing  Goal: Prevent/manage excess moisture  Outcome: Progressing  Goal: Prevent/minimize sheer/friction injuries  Outcome: Progressing  Goal: Promote/optimize nutrition  Outcome: Progressing  Goal: Promote skin healing  Outcome: Progressing

## 2024-12-13 NOTE — CARE PLAN
The patient's goals for the shift include      The clinical goals for the shift include pt will remain free and safe from falls and further injury      Problem: Pain  Goal: Takes deep breaths with improved pain control throughout the shift  Outcome: Progressing  Goal: Turns in bed with improved pain control throughout the shift  Outcome: Progressing  Goal: Walks with improved pain control throughout the shift  Outcome: Progressing  Goal: Performs ADL's with improved pain control throughout shift  Outcome: Progressing  Goal: Participates in PT with improved pain control throughout the shift  Outcome: Progressing  Goal: Free from opioid side effects throughout the shift  Outcome: Progressing  Goal: Free from acute confusion related to pain meds throughout the shift  Outcome: Progressing     Problem: Skin  Goal: Decreased wound size/increased tissue granulation at next dressing change  Outcome: Progressing  Goal: Participates in plan/prevention/treatment measures  Outcome: Progressing  Goal: Prevent/manage excess moisture  Outcome: Progressing  Goal: Prevent/minimize sheer/friction injuries  Outcome: Progressing  Goal: Promote/optimize nutrition  Outcome: Progressing  Goal: Promote skin healing  Outcome: Progressing

## 2024-12-13 NOTE — PROGRESS NOTES
Transitional Care Coordinator Note: Patient discussed in morning rounds, per medical team (trauma) patient is medically ready. Discharge dispo: Plan for patient to discharge to AR vs home with HC ([facility]). TCC updated by therapy team, patient evaluated 12/13 and recommendation remains High Intensity. TCC met with patient to discuss discharge plan and provide education on recommended level of care. Patient expressed concerns about rehab, providing TCC with SNF locations that she has visited in the past. TCC provided education on AR and level of care. Patient expressed understanding and agreeable to review brochure for AR [facility] as facility is closet to both patient and patient's friend Radha. Patient requested for discharge planning team to follow up in the evening or tomorrow for confirmed discharge plan of home with homecare vs AR. Per therapy team plan for PT and OT to see patient 12/14 for additional support as well as caregiver education for hip precautions. Trauma team and SW updated.       Rubin Damon RN BSN   Transitional Care Coordinator

## 2024-12-13 NOTE — PROGRESS NOTES
Physical Therapy    Physical Therapy Treatment    Patient Name: Sue Carter  MRN: [id number]  Department: [place] [address]  [id number]  Today's Date: 12/13/2024  Time Calculation  Start Time: 0839  Stop Time: 0923  Time Calculation (min): 44 min         Assessment/Plan   PT Assessment  PT Assessment Results: Decreased strength, Impaired balance, Decreased mobility, Orthopedic restrictions  Rehab Prognosis: Good  Physical Needs: Stair navigation into home limited by function/safety, High falls risk due to function or environment, Ambulating household distances limited by function/safety, Intermittent mobility assistance needed  Medical Staff Made Aware: Yes  End of Session Communication: Bedside nurse  Assessment Comment: Pt tolerated session well with no pain.  Demonstrates slightly ataxic LEs with amb.  Pt had 2 episodes of LOB when turning to sit in chair with Max A and Mod A to regain balance-no fall.  Pt participated in stair training this session with Min A for balance and RW negotiation.  Pt continues to benefit from skilled PT and remains appropriate for high intensity PT upon discharge however if pt refuses, will require additional stair training with friend (caregiver) present in order to safely enter/exit the home as pt has 3 steps to enter with R rail and R posterior hip precautions and FFWB R LE restrictions.  End of Session Patient Position: Up in chair, Alarm on  PT Plan  Inpatient/Swing Bed or Outpatient: Inpatient  PT Plan  Treatment/Interventions: Bed mobility, Transfer training, Gait training, Stair training, Balance training, Neuromuscular re-education, Strengthening, Therapeutic exercise, Therapeutic activity, Home exercise program  PT Plan: Ongoing PT  PT Frequency: Daily  PT Discharge Recommendations: High intensity level of continued care  Equipment Recommended upon Discharge: Wheeled walker  PT Recommended Transfer Status: Assistive device, Stand by assist (RW)  PT - OK to Discharge:  (Yes if D/C  to high intensity PT, no if D/C home)      General Visit Information:   PT  Visit  PT Received On: 12/13/24  General  Reason for Referral: Fall with R PW tab fx, s/p ORIF R acetabulum on 12/11 with Dr. Benitez  Past Medical History Relevant to Rehab: Per chart, PMH includes Cerebral amyloid angiopathy, osteoporosis, bilateral dry macular degeneration, Hx DVT (2020)  Family/Caregiver Present: No  Co-Treatment:  (PTA assisted with stair training portion of session for pt safety)  Prior to Session Communication: Bedside nurse  Patient Position Received: Bed, 3 rail up, Alarm off, not on at start of session  General Comment: Pt cleared for PT by RN.  Pt alert and agreeable to PT. +PIV, external catheter    Subjective   Precautions:  Precautions  LE Weight Bearing Status: Flat-foot Weight Bearing (R LE)  Medical Precautions: Fall precautions  Post-Surgical Precautions: Right hip precautions (R posterior hip precautions-pt unable to recall, reorientation/education provided)    Vital Signs Comment: Pre PT , after amb 122, after stairs 109, end of session 89     Objective   Pain:  Pain Assessment  Pain Assessment: 0-10  0-10 (Numeric) Pain Score: 0 - No pain  Pain Type: Surgical pain  Pain Location: Leg  Pain Orientation: Right, Proximal  Pain Interventions: Ambulation/increased activity, Repositioned, Rest  Response to Interventions: Increase in pain (to 4/10)  Cognition:  Cognition  Overall Cognitive Status: Within Functional Limits  Orientation Level: Oriented X4  Coordination:  Movements are Fluid and Coordinated: Yes (amb appearing slightly ataxic with LEs)  Postural Control:  Postural Control  Postural Control: Within Functional Limits  Static Sitting Balance  Static Sitting-Balance Support: Bilateral upper extremity supported, Feet supported  Static Sitting-Level of Assistance: Distant supervision  Dynamic Sitting Balance  Dynamic Sitting-Balance Support: Bilateral upper extremity supported, Feet  "supported  Dynamic Sitting-Level of Assistance: Close supervision  Dynamic Sitting-Balance: Lateral lean  Static Standing Balance  Static Standing-Balance Support: Bilateral upper extremity supported (RW)  Static Standing-Level of Assistance: Close supervision  Dynamic Standing Balance  Dynamic Standing-Balance Support: Bilateral upper extremity supported (RW)  Dynamic Standing-Level of Assistance: Close supervision, Maximum assistance, Moderate assistance  Dynamic Standing-Balance: Turning  Dynamic Standing-Comments: Pt with LOB 2x when turning requiring Max and Mod A to regain balance  Activity Tolerance:  Activity Tolerance  Endurance: Tolerates 10 - 20 min exercise with multiple rests  Treatments:  Therapeutic Exercise  Therapeutic Exercise Performed: Yes  Therapeutic Exercise Activity 1: LAQx3\" hold, ankle pumps, quad sets x3\" hold, hip abd/add (AAROM R LE) x10 each LE.  Edu on heel slides in full supine for additional HEP    Therapeutic Activity  Therapeutic Activity Performed: Yes  Therapeutic Activity 1: increased time for bed mobility, transfers, pt edu on stair training and friend guarding pt (friend not present)    Bed Mobility  Bed Mobility: Yes  Bed Mobility 1  Bed Mobility 1: Supine to sitting  Level of Assistance 1: Close supervision  Bed Mobility Comments 1: increased time to complete, HOB elevated, increased difficulty moving R LE but able to complete with increased time  Bed Mobility 2  Bed Mobility  2: Scooting (forward at EOB)  Level of Assistance 2: Close supervision  Bed Mobility Comments 2: increased time to complete due to difficulty    Ambulation/Gait Training  Ambulation/Gait Training Performed: Yes  Ambulation/Gait Training 1  Surface 1: Level tile, Carpet  Device 1: Rolling walker  Gait Support Devices: Gait belt  Assistance 1: Close supervision, Maximum assistance  Quality of Gait 1: Narrow base of support, Decreased step length, Diminished heel strike, Inconsistent stride length " (slightly ataxic LEs)  Comments/Distance (ft) 1: 30ft  Ambulation/Gait Training 2  Surface 2: Level tile, Carpet  Device 2: Rolling walker  Gait Support Devices: Gait belt  Assistance 2: Close supervision  Quality of Gait 2: Narrow base of support, Decreased step length, Inconsistent stride length, Diminished heel strike, Ataxic (slightly ataxic LEs)  Comments/Distance (ft) 2: 10ft, seated rest break due to  RN moving pt bed in room, 20ft  Transfers  Transfer: Yes  Transfer 1  Transfer From 1: Bed to  Transfer to 1: Stand  Technique 1: Sit to stand, Stand to sit  Transfer Device 1: Walker  Transfer Level of Assistance 1: Close supervision  Trials/Comments 1: VCs for B hand placement on bed for leverage and  VCs to maintain posterior hip precautions  Transfers 2  Transfer From 2: Chair with arms to  Transfer to 2: Stand  Technique 2: Stand to sit, Sit to stand  Transfer Device 2: Walker, Gait belt  Transfer Level of Assistance 2: Close supervision, Moderate assistance  Trials/Comments 2: 3x throughout session.  Pt with LOB turning to sit in chair with Max A to regain balance,  pt with additional LOB turning to sit in chair 2nd time with mod A to regain balance, no LOB 3rd time.    Stairs  Stairs: Yes  Stairs  Rails 1: Right  Device 1: Railing  Support Devices 1: Gait belt  Assistance 1: Minimum assistance  Comment/Number of Steps 1: 2 steps ascending backward/descending forward with RW legs adjusted apprioriately for safety on the stairs, step by step VCs for sequencing and to maintain FFWB status on R LE.  Assist for RW management.  VCs for full upright posture to prevent forward LOB    Outcome Measures:  [facility] Basic Mobility  Turning from your back to your side while in a flat bed without using bedrails: A little  Moving from lying on your back to sitting on the side of a flat bed without using bedrails: A little  Moving to and from bed to chair (including a wheelchair): A little  Standing up from a chair using  your arms (e.g. wheelchair or bedside chair): A little  To walk in hospital room: A little  Climbing 3-5 steps with railing: A little  Basic Mobility - Total Score: 18    Education Documentation  Precautions, taught by Sena Lagunas PT at 12/13/2024 10:01 AM.  Learner: Patient  Readiness: Acceptance  Method: Explanation  Response: Verbalizes Understanding, Needs Reinforcement  Comment: FFWB R LE, R posterior hip precautions    Body Mechanics, taught by Sena Lagunas PT at 12/13/2024 10:01 AM.  Learner: Patient  Readiness: Acceptance  Method: Explanation  Response: Verbalizes Understanding, Needs Reinforcement  Comment: FFWB R LE, R posterior hip precautions    Home Exercise Program, taught by Sena Lagunas PT at 12/13/2024 10:01 AM.  Learner: Patient  Readiness: Acceptance  Method: Explanation  Response: Verbalizes Understanding, Needs Reinforcement  Comment: FFWB R LE, R posterior hip precautions    Mobility Training, taught by Sena Lagunas PT at 12/13/2024 10:01 AM.  Learner: Patient  Readiness: Acceptance  Method: Explanation  Response: Verbalizes Understanding, Needs Reinforcement  Comment: FFWB R LE, R posterior hip precautions    Education Comments  No comments found.      Encounter Problems       Encounter Problems (Active)       Balance       Pt will maintain static/dynamic standing balance x4 minutes with LRAD and modified independence to demonstrate improved balance while maintaining FFWB R LE and R posteiror hip precautions. (Progressing)       Start:  12/12/24    Expected End:  12/26/24               Mobility       Pt will complete bed mobility independently with HOB flat and no use of bed rails while maintaining FFWB R LE and R posteiror hip precautions. (Progressing)       Start:  12/12/24    Expected End:  12/26/24            Pt will amb >50ft with LRAD and modified independence while maintaining FFWB R LE and R posteiror hip precaution in prep for safe discharge home  (Progressing)       Start:   12/12/24    Expected End:  12/26/24            Pt will a/descend 3 steps with R rail and LRAD with supervision in prep for safe home entry while maintaining FFWB R LE and R posterior hip precautions. (Progressing)       Start:  12/12/24    Expected End:  12/26/24               PT Transfers       Pt will transfer sit to stand with LRAD and modified independence while maintaining FFWB R LE and R posteiror hip precautions in prep for out of bed mobility  (Progressing)       Start:  12/12/24    Expected End:  12/26/24

## 2024-12-13 NOTE — PROGRESS NOTES
"[facility]  TRAUMA SERVICE - PROGRESS NOTE    Patient Name: Sue Carter  MRN: [id number]  Admit Date: 1210  : 1950  AGE: 74 y.o.   GENDER: female  ==============================================================================  MECHANISM OF INJURY:   74 year-old female presents as a trauma consult, transferred from [facility], s/p mechanical ground level fall. Patient states she was walking and her \"legs gave out\". She denies dizziness, lightheadedness prior to the fall. She fell, landing on her right side. Patient unable to ambulate after the fall. Imaging obtained at outside hospital shows comminuted fracture of right acetabulum. She denies blood thinner use, head strike, and loss of consciousness. She endorses right hip pain and mild nausea at time of exam.      LOC (yes/no?): no  Anticoagulant / Anti-platelet Rx? (for what dx?): denies  Referring Facility Name (N/A for scene EMR run): [facility]     INJURIES:   Comminuted R acetabular fx with posterior subluxation of R femoral head     OTHER MEDICAL PROBLEMS:  Hx cerebral amyloid angiopathy  Hx DVT () not on anticoagulation  Hx osteoporosis     INCIDENTAL FINDINGS:  Right temporal lobe encephalomalacia.  Diffuse nonspecific white matter changes - likely sequela of small-vessel ischemic disease        PROCEDURES:   R acetabulum ORIF    ==============================================================================  TODAY'S ASSESSMENT AND PLAN OF CARE:    Neuro:  -MMP: advance as tolerated   Scheduled: Tylenol, Robaxin, Lido patch, Ibuprofen   PRN: Oxy 2.5/5 q4  -Melatonin    CV:  -No needs    Pulm:  -No needs, wean O2 as tolerated    GI:   -Regular diet  -bowel regimen, magnesium citrate  -Zofran PRN  -Multivit    : JEFFY (resolved), baseline Cr 1.09 ()  -Passed TOV, external bynum for comfort vs none  -DC mIVF given JEFFY    Endo: No needs    ID: completed periop ancef    MSK:   -Calcium/VitD supplements per ortho " recs    Heme/PPX: Baseline Hb 12-13  -Prior SAH, has IVC filter (2020)  -Monitor CBC as appropriate  -SQH TID given JEFFY, patient refusing despite education  -Ensure SCD given hesitancy with pharmacologics    Serum creatinine: 0.85 mg/dL 12/13/24 0853  Estimated creatinine clearance: 53.5 mL/min    Dispo: Pain well controlled, working with PTOT, recs AR vs home with HHC, JEFFY resolved. Dispo pending patient decision after 12/14 PTOT session    Discussed with Dr. Rola Sanderson MD  General Surgery: PGY-1  Trauma Surgery Pager: [phone]    ==============================================================================  CHIEF COMPLAINT / OVERNIGHT EVENTS:   NAEON. Passed TOV. Needs further work on stairs per PT. Continues to decline DVT ppx despite informed discussion. Initially disinterested in AR as believed was similar to SNF but after informed discussion now open to considering. Her friend Radha is coming tomorrow for PT/OT session given her hip precautions, PTOT aware (in the event patient is ultimately discharged with home health care). Radha is able to take patient per d/w TCC/SW. Passing BM, tolerating diet.    MEDICAL HISTORY / ROS:  Admission history and ROS reviewed. Pertinent changes as follows: none      PHYSICAL EXAM:  Heart Rate:  [68-78]   Temp:  [36 °C (96.8 °F)-37 °C (98.6 °F)]   Resp:  [15-16]   BP: ()/(45-68)   SpO2:  [92 %-98 %]   Physical Exam  Constitutional:       General: She is not in acute distress.     Appearance: Normal appearance. She is normal weight. She is not ill-appearing.   HENT:      Head: Normocephalic and atraumatic.      Nose: Nose normal.      Mouth/Throat:      Pharynx: Oropharynx is clear.   Eyes:      General:         Right eye: No discharge.         Left eye: No discharge.   Cardiovascular:      Rate and Rhythm: Normal rate.      Pulses: Normal pulses.   Pulmonary:      Effort: Pulmonary effort is normal. No respiratory distress.      Breath sounds: No  wheezing.   Abdominal:      General: Abdomen is flat. There is no distension.      Tenderness: There is no abdominal tenderness.   Genitourinary:     Comments: No bynum      Musculoskeletal:         General: No swelling or tenderness (appropriate).      Cervical back: Normal range of motion.      Comments: Good R DP, soft, swollen compartments appropriate   Skin:     General: Skin is warm and dry.      Capillary Refill: Capillary refill takes less than 2 seconds.      Coloration: Skin is not jaundiced or pale.   Neurological:      General: No focal deficit present.      Mental Status: She is alert and oriented to person, place, and time. Mental status is at baseline.      Sensory: No sensory deficit.      Motor: Weakness (postoperative, appropriate) present.      Comments: 3/5 strength RLE, sensation intact   Psychiatric:         Mood and Affect: Mood normal.         Behavior: Behavior normal.         IMAGING SUMMARY:  (summary of new imaging findings, not a copy of dictation)  12/12 pelvic XR: no acute abnormalities    LABS:  Results from last 7 days   Lab Units 12/11/24  1724 12/10/24  1841   WBC AUTO x10*3/uL 17.0* 16.2*   HEMOGLOBIN g/dL 12.1 13.2   HEMATOCRIT % 37.5 38.8   PLATELETS AUTO x10*3/uL 179 159   NEUTROS PCT AUTO %  --  84.6   LYMPHS PCT AUTO %  --  9.0   MONOS PCT AUTO %  --  5.6   EOS PCT AUTO %  --  0.2     Results from last 7 days   Lab Units 12/10/24  1841   APTT seconds 28   INR  1.1     Results from last 7 days   Lab Units 12/13/24  0853 12/12/24  1151 12/10/24  1841   SODIUM mmol/L 141 136 137   POTASSIUM mmol/L 4.0 3.9 3.8   CHLORIDE mmol/L 100 96* 103   CO2 mmol/L 30 33* 25   BUN mg/dL 22 27* 16   CREATININE mg/dL 0.85 1.16* 0.73   CALCIUM mg/dL 9.1 9.1 8.7   PROTEIN TOTAL g/dL  --   --  7.0   BILIRUBIN TOTAL mg/dL  --   --  0.5   ALK PHOS U/L  --   --  69   ALT U/L  --   --  17   AST U/L  --   --  25   GLUCOSE mg/dL 92 104* 98     Results from last 7 days   Lab Units 12/10/24  1841    BILIRUBIN TOTAL mg/dL 0.5           I have reviewed all medications, laboratory results, and imaging pertinent for today's encounter.

## 2024-12-14 ENCOUNTER — DOCUMENTATION (OUTPATIENT)
Dept: HOME HEALTH SERVICES | Facility: HOME HEALTH | Age: 74
End: 2024-12-14
Payer: MEDICARE

## 2024-12-14 VITALS
TEMPERATURE: 97.7 F | HEART RATE: 86 BPM | RESPIRATION RATE: 18 BRPM | DIASTOLIC BLOOD PRESSURE: 60 MMHG | HEIGHT: 66 IN | SYSTOLIC BLOOD PRESSURE: 98 MMHG | BODY MASS INDEX: 24.11 KG/M2 | WEIGHT: 150 LBS | OXYGEN SATURATION: 94 %

## 2024-12-14 PROCEDURE — 2500000005 HC RX 250 GENERAL PHARMACY W/O HCPCS

## 2024-12-14 PROCEDURE — 97116 GAIT TRAINING THERAPY: CPT | Mod: GP,CQ

## 2024-12-14 PROCEDURE — 2500000001 HC RX 250 WO HCPCS SELF ADMINISTERED DRUGS (ALT 637 FOR MEDICARE OP)

## 2024-12-14 PROCEDURE — 97530 THERAPEUTIC ACTIVITIES: CPT | Mod: GP,CQ

## 2024-12-14 PROCEDURE — 2500000004 HC RX 250 GENERAL PHARMACY W/ HCPCS (ALT 636 FOR OP/ED)

## 2024-12-14 PROCEDURE — 2500000001 HC RX 250 WO HCPCS SELF ADMINISTERED DRUGS (ALT 637 FOR MEDICARE OP): Performed by: PHYSICIAN ASSISTANT

## 2024-12-14 RX ORDER — OXYCODONE HYDROCHLORIDE 5 MG/1
5 TABLET ORAL EVERY 4 HOURS PRN
Qty: 15 TABLET | Refills: 0 | Status: SHIPPED | OUTPATIENT
Start: 2024-12-14

## 2024-12-14 RX ORDER — METHOCARBAMOL 500 MG/1
500 TABLET, FILM COATED ORAL EVERY 8 HOURS SCHEDULED
Qty: 45 TABLET | Refills: 0 | Status: SHIPPED | OUTPATIENT
Start: 2024-12-14 | End: 2024-12-29

## 2024-12-14 RX ORDER — POLYETHYLENE GLYCOL 3350 17 G/17G
17 POWDER, FOR SOLUTION ORAL 2 TIMES DAILY
Start: 2024-12-14

## 2024-12-14 RX ORDER — AMOXICILLIN 250 MG
2 CAPSULE ORAL 2 TIMES DAILY
Start: 2024-12-14

## 2024-12-14 RX ORDER — ASPIRIN 81 MG/1
81 TABLET ORAL 2 TIMES DAILY
Qty: 60 TABLET | Refills: 0 | Status: SHIPPED | OUTPATIENT
Start: 2024-12-14 | End: 2025-01-13

## 2024-12-14 RX ORDER — LIDOCAINE 560 MG/1
1 PATCH PERCUTANEOUS; TOPICAL; TRANSDERMAL DAILY
Start: 2024-12-15

## 2024-12-14 RX ORDER — IBUPROFEN 200 MG
200 TABLET ORAL EVERY 6 HOURS SCHEDULED
Start: 2024-12-14

## 2024-12-14 RX ORDER — ACETAMINOPHEN 325 MG/1
975 TABLET ORAL EVERY 8 HOURS PRN
Start: 2024-12-14

## 2024-12-14 RX ORDER — TALC
3 POWDER (GRAM) TOPICAL NIGHTLY PRN
Start: 2024-12-14

## 2024-12-14 ASSESSMENT — COGNITIVE AND FUNCTIONAL STATUS - GENERAL
STANDING UP FROM CHAIR USING ARMS: A LITTLE
STANDING UP FROM CHAIR USING ARMS: A LITTLE
WALKING IN HOSPITAL ROOM: A LITTLE
TURNING FROM BACK TO SIDE WHILE IN FLAT BAD: A LITTLE
MOVING FROM LYING ON BACK TO SITTING ON SIDE OF FLAT BED WITH BEDRAILS: A LITTLE
MOVING FROM LYING ON BACK TO SITTING ON SIDE OF FLAT BED WITH BEDRAILS: A LITTLE
WALKING IN HOSPITAL ROOM: A LITTLE
MOBILITY SCORE: 18
MOBILITY SCORE: 18
MOVING TO AND FROM BED TO CHAIR: A LITTLE
MOVING TO AND FROM BED TO CHAIR: A LITTLE
TURNING FROM BACK TO SIDE WHILE IN FLAT BAD: A LITTLE
CLIMB 3 TO 5 STEPS WITH RAILING: A LITTLE
CLIMB 3 TO 5 STEPS WITH RAILING: A LITTLE

## 2024-12-14 ASSESSMENT — PAIN - FUNCTIONAL ASSESSMENT
PAIN_FUNCTIONAL_ASSESSMENT: 0-10
PAIN_FUNCTIONAL_ASSESSMENT: 0-10

## 2024-12-14 ASSESSMENT — PAIN SCALES - GENERAL
PAINLEVEL_OUTOF10: 5 - MODERATE PAIN
PAINLEVEL_OUTOF10: 3
PAINLEVEL_OUTOF10: 4

## 2024-12-14 ASSESSMENT — PAIN DESCRIPTION - DESCRIPTORS
DESCRIPTORS: ACHING
DESCRIPTORS: ACHING

## 2024-12-14 ASSESSMENT — ACTIVITIES OF DAILY LIVING (ADL): EFFECT OF PAIN ON DAILY ACTIVITIES: DID NOT LIMIT PARTICIPATION

## 2024-12-14 NOTE — CARE PLAN
Met with pt and introduced myself as care coordinator with Care Transitions Team for discharge planning. Pt is agreeable to discharge to home with HC.  MD is aware of Pt discharge along with the Nurse. Pt is leaving Via car with Friend. Pt reported no safety concerns at home.  Pt's address, phone number, and contact information was verified.  Discharge Planning: Pt is discharging to home with [facility], SOC 24-48 Hours. Patient will receive HC services at her Friend [facility] at [address] [place] [address]. [facility] aware,

## 2024-12-14 NOTE — NURSING NOTE
Nurse went over discharge paperwork with patient and family at bedside nurse was able to answer all questions or concerns patient had at this time. IV access was removed and patient tolerated well catheter was still intact. Transportation was called to assist patient to the front lobby entrance to family vehicle pt. In stable condition at time of discharge.

## 2024-12-14 NOTE — PROGRESS NOTES
SW spoke to patient to obtain choice of rehab.  Patient stated she now wants to go home and has 24/7 assistance available.  Notified team.

## 2024-12-14 NOTE — DISCHARGE SUMMARY
"Discharge Diagnosis  Closed displaced fracture of right acetabulum (Multi)    Issues Requiring Follow-Up  Orthopedic Surgeon: Postoperative followup with Dr. Benitez  PCP Followup    Test Results Pending At Discharge  Pending Labs       No current pending labs.          Hospital Course  74 year-old female presents as a trauma consult, transferred from [facility], s/p mechanical ground level fall. Patient states she was walking and her \"legs gave out\". She denies dizziness, lightheadedness prior to the fall. She fell, landing on her right side. Patient unable to ambulate after the fall. Imaging obtained at outside hospital shows comminuted fracture of right acetabulum. She denies blood thinner use, head strike, and loss of consciousness. She endorses right hip pain and mild nausea at time of exam. Patient went to the OR with orthopedics for R hip posterior ORIF. Patient FFWB with posterior hip precautions post-operatively. Patient received nilson-operative Ancef. Working with PT/OT, rec'd acute rehab. Able to work with PT and perform stairs appropriately. Has capacity, requesting [facility] and will stay with her friend Radha who confirmed will be able to provide 24/7 care. Patient and Radha educated by PT today for movement restrictions. HHC SOC confirmed.     At the time of discharge, patient's pain was controlled with oral analgesia, patient was urinating, having BMs, sleeping, and tolerating a diet. Based on PT/OT's recommendation, patient was discharged in stable condition with scripts and follow up appointments as appropriate. Discharge plan was discussed with the patient/family and all questions were discussed and answered. Patient/family agreeable to plan. Patient discharged in stable condition.     PCP Followup:  Please follow up with your PCP within 1-2 weeks after discharge regarding your recent hospital admission.      Orthopedic Followup:  If you have not been contacted within 2-3 days of discharge from the " hospital, please call [phone] to schedule your follow up visit within 2 weeks of discharge with orthopedics regarding your recent hospital admission.          Pertinent Physical Exam At Time of Discharge  Physical Exam  Constitutional:       General: She is not in acute distress.     Appearance: Normal appearance. She is normal weight. She is not ill-appearing.   HENT:      Head: Normocephalic and atraumatic.      Nose: Nose normal.      Mouth/Throat:      Pharynx: Oropharynx is clear.   Eyes:      General:         Right eye: No discharge.         Left eye: No discharge.   Cardiovascular:      Rate and Rhythm: Normal rate.      Pulses: Normal pulses.   Pulmonary:      Effort: Pulmonary effort is normal. No respiratory distress.      Breath sounds: No wheezing.   Abdominal:      General: Abdomen is flat. There is no distension.      Tenderness: There is no abdominal tenderness.   Genitourinary:     Comments: No bynum        Musculoskeletal:         General: No swelling or tenderness (appropriate).      Cervical back: Normal range of motion.      Comments: Good R DP, soft, swollen compartments appropriate. No strikethrough  Skin:     General: Skin is warm and dry.      Capillary Refill: Capillary refill takes less than 2 seconds.      Coloration: Skin is not jaundiced or pale.   Neurological:      General: No focal deficit present.      Mental Status: She is alert and oriented to person, place, and time. Mental status is at baseline.      Sensory: No sensory deficit.      Motor: Weakness (postoperative, appropriate) present.      Comments: 3/5 strength RLE, sensation intact   Psychiatric:         Mood and Affect: Mood normal.         Behavior: Behavior normal.           Home Medications     Medication List      START taking these medications     acetaminophen 325 mg tablet; Commonly known as: Tylenol; Take 3 tablets   (975 mg) by mouth every 8 hours if needed for mild pain (1 - 3).   aspirin 81 mg EC tablet;  Take 1 tablet (81 mg) by mouth 2 times a day.   calcium carbonate-vitamin D3 500 mg-5 mcg (200 unit) tablet; Take 1   tablet by mouth 2 times a day.   ibuprofen 200 mg tablet; Take 1 tablet (200 mg) by mouth every 6 hours.   lidocaine 4 % patch; Place 1 patch over 12 hours on the skin once daily.   Remove & discard patch within 12 hours or as directed by MD.; Start taking   on: December 15, 2024   melatonin 3 mg tablet; Take 1 tablet (3 mg) by mouth as needed at   bedtime for sleep.   methocarbamol 500 mg tablet; Commonly known as: Robaxin; Take 1 tablet   (500 mg) by mouth every 8 hours for 15 days.   oxyCODONE 5 mg immediate release tablet; Commonly known as: Roxicodone;   Take 1 tablet (5 mg) by mouth every 4 hours if needed for severe pain (7 -   10) for up to 15 doses.   polyethylene glycol 17 gram packet; Commonly known as: Glycolax,   Miralax; Take 17 g by mouth 2 times a day.   sennosides-docusate sodium 8.6-50 mg tablet; Commonly known as:   Yasmeen-Colace; Take 2 tablets by mouth 2 times a day.     CONTINUE taking these medications     multivitamin with minerals tablet       Outpatient Follow-Up  Future Appointments   Date Time Provider Department Center   5/15/2025  9:30 AM Yuval Gonzalez MD [id number]       Drew Sanderson MD

## 2024-12-14 NOTE — CARE PLAN
The patient's goals for the shift include      The clinical goals for the shift include pt will remain free and safe from falls and injury      Problem: Pain  Goal: Takes deep breaths with improved pain control throughout the shift  Outcome: Progressing  Goal: Turns in bed with improved pain control throughout the shift  Outcome: Progressing  Goal: Walks with improved pain control throughout the shift  Outcome: Progressing  Goal: Performs ADL's with improved pain control throughout shift  Outcome: Progressing  Goal: Participates in PT with improved pain control throughout the shift  Outcome: Progressing  Goal: Free from opioid side effects throughout the shift  Outcome: Progressing  Goal: Free from acute confusion related to pain meds throughout the shift  Outcome: Progressing     Problem: Skin  Goal: Decreased wound size/increased tissue granulation at next dressing change  Outcome: Progressing  Flowsheets (Taken 12/14/2024 1035)  Decreased wound size/increased tissue granulation at next dressing change:   Promote sleep for wound healing   Protective dressings over bony prominences   Utilize specialty bed per algorithm  Goal: Participates in plan/prevention/treatment measures  Outcome: Progressing  Flowsheets (Taken 12/14/2024 1035)  Participates in plan/prevention/treatment measures:   Discuss with provider PT/OT consult   Elevate heels   Increase activity/out of bed for meals  Goal: Prevent/manage excess moisture  Outcome: Progressing  Flowsheets (Taken 12/14/2024 1035)  Prevent/manage excess moisture:   Cleanse incontinence/protect with barrier cream   Monitor for/manage infection if present   Use wicking fabric (obtain order)   Follow provider orders for dressing changes   Moisturize dry skin  Goal: Prevent/minimize sheer/friction injuries  Outcome: Progressing  Flowsheets (Taken 12/14/2024 1035)  Prevent/minimize sheer/friction injuries:   Complete micro-shifts as needed if patient unable. Adjust patient  position to relieve pressure points, not a full turn   Increase activity/out of bed for meals   Use pull sheet   Turn/reposition every 2 hours/use positioning/transfer devices   Utilize specialty bed per algorithm   HOB 30 degrees or less  Goal: Promote/optimize nutrition  Outcome: Progressing  Flowsheets (Taken 12/14/2024 1035)  Promote/optimize nutrition:   Assist with feeding   Monitor/record intake including meals   Offer water/supplements/favorite foods   Consume > 50% meals/supplements   Reassess MST if dietician not consulted  Goal: Promote skin healing  Outcome: Progressing  Flowsheets (Taken 12/14/2024 1035)  Promote skin healing:   Assess skin/pad under line(s)/device(s)   Protective dressings over bony prominences   Turn/reposition every 2 hours/use positioning/transfer devices   Rotate device position/do not position patient on device   Ensure correct size (line/device) and apply per  instructions

## 2024-12-14 NOTE — PROGRESS NOTES
Physical Therapy    Physical Therapy Treatment    Patient Name: Sue Carter  MRN: [id number]  Department: [place] [id number]  Room: [facility]8076-  Today's Date: 12/14/2024  Time Calculation  Start Time: 1118  Stop Time: 1143  Time Calculation (min): 25 min         Assessment/Plan   PT Assessment  End of Session Communication: Bedside nurse  Assessment Comment: pt continues to progress ambulation distances, functional assist levels, and was able to safely complete 3 steps. pt remains appropriate for HIGH intensity pt upon D/C to return to [facility].  End of Session Patient Position: Up in chair, Alarm off, caregiver present  PT Plan  Inpatient/Swing Bed or Outpatient: Inpatient  PT Plan  Treatment/Interventions: Bed mobility, Transfer training, Gait training, Stair training, Balance training, Neuromuscular re-education, Strengthening, Therapeutic exercise, Therapeutic activity, Home exercise program  PT Plan: Ongoing PT  PT Frequency: Daily  PT Discharge Recommendations: High intensity level of continued care  Equipment Recommended upon Discharge: Wheeled walker  PT Recommended Transfer Status: Assistive device, Stand by assist (RW)  PT - OK to Discharge:  (Yes if D/C to high intensity PT, yes for Home with 24/7 assist)      General Visit Information:   PT  Visit  PT Received On: 12/14/24  Response to Previous Treatment: Patient with no complaints from previous session.  General  Reason for Referral: Fall with R PW tab fx, s/p ORIF R acetabulum on 12/11 with Dr. Benitez  Past Medical History Relevant to Rehab: Per chart, PMH includes Cerebral amyloid angiopathy, osteoporosis, bilateral dry macular degeneration, Hx DVT (2020)  Family/Caregiver Present: Yes  Caregiver Feedback: Friend present and supportive.  Prior to Session Communication: Bedside nurse  Patient Position Received: Bed, 3 rail up (Seated EOB)  Preferred Learning Style: auditory, verbal, visual  General Comment: pt pleasant and cooperative    Subjective  "  Precautions:  Precautions  LE Weight Bearing Status: Flat-foot Weight Bearing (RLE)  Medical Precautions: Fall precautions  Post-Surgical Precautions: Right hip precautions (Handout given for education)  Precautions Comment: pt able to maintain precautions throughout session        Objective   Pain:  Pain Assessment  Pain Assessment: 0-10  0-10 (Numeric) Pain Score: 4  Pain Type: Surgical pain  Pain Location: Hip  Pain Orientation: Right  Pain Descriptors: Aching  Pain Frequency: Constant/continuous  Pain Onset: Ongoing  Clinical Progression: Gradually improving  Effect of Pain on Daily Activities: Did not limit participation  Pain Interventions: Medication (See MAR), Repositioned, Ambulation/increased activity  Response to Interventions: Decrease in pain (3/10)  Cognition:  Cognition  Overall Cognitive Status: Within Functional Limits  Orientation Level: Oriented X4   Postural Control:  Postural Control  Postural Control: Within Functional Limits  Static Sitting Balance  Static Sitting-Balance Support: Feet supported  Static Sitting-Level of Assistance: Distant supervision  Dynamic Sitting Balance  Dynamic Sitting-Balance Support: Feet supported  Dynamic Sitting-Level of Assistance: Close supervision  Dynamic Sitting-Balance: Trunk control activities (efren burris)  Static Standing Balance  Static Standing-Balance Support: Bilateral upper extremity supported (FWW)  Static Standing-Level of Assistance: Close supervision  Dynamic Standing Balance  Dynamic Standing-Balance Support: Bilateral upper extremity supported (FWW)  Dynamic Standing-Level of Assistance: Close supervision  Dynamic Standing-Balance: Turning    Activity Tolerance:  Activity Tolerance  Endurance: Tolerates 10 - 20 min exercise with multiple rests  Treatments:  Therapeutic Exercise  Therapeutic Exercise Performed: Yes  Therapeutic Exercise Activity 1: Seated AP, LAQ with 3\" hold: AROM x 10 reps each    Therapeutic Activity  Therapeutic Activity " Performed: Yes  Therapeutic Activity 1: increased time for bed mobility, transfers, pt edu on stair training and friend guarding pt.    Ambulation/Gait Training  Ambulation/Gait Training Performed: Yes  Ambulation/Gait Training 1  Surface 1: Level tile, Carpet  Device 1: Rolling walker  Gait Support Devices: Gait belt  Assistance 1: Close supervision  Quality of Gait 1: Narrow base of support, Decreased step length, Diminished heel strike, Inconsistent stride length  Comments/Distance (ft) 1: 40' x 3 (~2 minute seated rest period between bouts.)  Transfers  Transfer: Yes  Transfer 1  Transfer From 1: Bed to  Transfer to 1: Stand  Technique 1: Sit to stand  Transfer Device 1: Walker  Transfer Level of Assistance 1: Close supervision  Trials/Comments 1: pt able to maintain hip precautions throughout ambulation session  Transfers 2  Transfer From 2: Stand to  Transfer to 2: Chair with arms  Technique 2: Stand to sit  Transfer Device 2: Walker, Gait belt  Transfer Level of Assistance 2: Close supervision, Minimal verbal cues  Trials/Comments 2: x3    Stairs  Stairs: Yes  Stairs  Rails 1: Right  Device 1: Wheeled walker  Support Devices 1: Gait belt  Assistance 1: Contact guard, Moderate verbal cues  Comment/Number of Steps 1: 3 steps ascending backward/descending forward with RW legs adjusted apprioriately for safety on the stairs, step by step VCs for sequencing and to maintain FFWB status on R LE. Assist for RW management. VCs for full upright posture to prevent forward LOB. (Friend/Caregiver present and receptive throughout stair training as she will be providing assistance to patient when returning home.)    Outcome Measures:  [facility] Basic Mobility  Turning from your back to your side while in a flat bed without using bedrails: A little  Moving from lying on your back to sitting on the side of a flat bed without using bedrails: A little  Moving to and from bed to chair (including a wheelchair): A little  Standing up  from a chair using your arms (e.g. wheelchair or bedside chair): A little  To walk in hospital room: A little  Climbing 3-5 steps with railing: A little  Basic Mobility - Total Score: 18    Education Documentation  Precautions, taught by Eamon Estrella PTA at 12/14/2024 12:22 PM.  Learner: Other, Patient  Readiness: Acceptance  Method: Explanation, Demonstration, Handout  Response: Verbalizes Understanding, Demonstrated Understanding  Comment: stair training. pts friend who will provide as caregiver was present during session.    Body Mechanics, taught by Eamon Estrella PTA at 12/14/2024 12:22 PM.  Learner: Other, Patient  Readiness: Acceptance  Method: Explanation, Demonstration, Handout  Response: Verbalizes Understanding, Demonstrated Understanding  Comment: stair training. pts friend who will provide as caregiver was present during session.    Home Exercise Program, taught by Eamon Estrella PTA at 12/14/2024 12:22 PM.  Learner: Other, Patient  Readiness: Acceptance  Method: Explanation, Demonstration, Handout  Response: Verbalizes Understanding, Demonstrated Understanding  Comment: stair training. pts friend who will provide as caregiver was present during session.    Mobility Training, taught by Eamon Estrella PTA at 12/14/2024 12:22 PM.  Learner: Other, Patient  Readiness: Acceptance  Method: Explanation, Demonstration, Handout  Response: Verbalizes Understanding, Demonstrated Understanding  Comment: stair training. pts friend who will provide as caregiver was present during session.    Education Comments  No comments found.           Encounter Problems       Encounter Problems (Active)       Balance       Pt will maintain static/dynamic standing balance x4 minutes with LRAD and modified independence to demonstrate improved balance while maintaining FFWB R LE and R posteiror hip precautions. (Progressing)       Start:  12/12/24    Expected End:  12/26/24               Mobility       Pt will complete bed  mobility independently with HOB flat and no use of bed rails while maintaining FFWB R LE and R posteiror hip precautions. (Progressing)       Start:  12/12/24    Expected End:  12/26/24            Pt will amb >50ft with LRAD and modified independence while maintaining FFWB R LE and R posteiror hip precaution in prep for safe discharge home  (Progressing)       Start:  12/12/24    Expected End:  12/26/24            Pt will a/descend 3 steps with R rail and LRAD with supervision in prep for safe home entry while maintaining FFWB R LE and R posterior hip precautions. (Progressing)       Start:  12/12/24    Expected End:  12/26/24               PT Transfers       Pt will transfer sit to stand with LRAD and modified independence while maintaining FFWB R LE and R posteiror hip precautions in prep for out of bed mobility  (Progressing)       Start:  12/12/24    Expected End:  12/26/24

## 2024-12-14 NOTE — CARE PLAN
The patient's goals for the shift include  pt will remain free from falls and injury     The clinical goals for the shift include pt will remain free and safe from falls and further injury        Problem: Pain  Goal: Takes deep breaths with improved pain control throughout the shift  Outcome: Progressing  Goal: Turns in bed with improved pain control throughout the shift  Outcome: Progressing  Goal: Walks with improved pain control throughout the shift  Outcome: Progressing  Goal: Performs ADL's with improved pain control throughout shift  Outcome: Progressing  Goal: Participates in PT with improved pain control throughout the shift  Outcome: Progressing  Goal: Free from opioid side effects throughout the shift  Outcome: Progressing  Goal: Free from acute confusion related to pain meds throughout the shift  Outcome: Progressing     Problem: Skin  Goal: Decreased wound size/increased tissue granulation at next dressing change  Outcome: Progressing  Goal: Participates in plan/prevention/treatment measures  Outcome: Progressing  Goal: Prevent/manage excess moisture  Outcome: Progressing  Goal: Prevent/minimize sheer/friction injuries  Outcome: Progressing  Goal: Promote/optimize nutrition  Outcome: Progressing  Goal: Promote skin healing  Outcome: Progressing

## 2024-12-14 NOTE — HH CARE COORDINATION
Home Care received a referral for Physical Therapy, Occupational Therapy, and Home Health Aide. Unfortunately, we are unable to accept and process the referral at this time.    Patients, please reach out to the referring provider or your PCP to assist in obtaining an alternative home care agency and/or guidance to meet your needs.    Providers, please reach out to  Home Care with any questions regarding the declined referral.

## 2024-12-15 LAB
ATRIAL RATE: 69 BPM
P AXIS: 51 DEGREES
P OFFSET: 208 MS
P ONSET: 152 MS
PR INTERVAL: 148 MS
Q ONSET: 226 MS
QRS COUNT: 11 BEATS
QRS DURATION: 82 MS
QT INTERVAL: 422 MS
QTC CALCULATION(BAZETT): 452 MS
QTC FREDERICIA: 442 MS
R AXIS: 46 DEGREES
T AXIS: 36 DEGREES
T OFFSET: 437 MS
VENTRICULAR RATE: 69 BPM

## 2024-12-16 ENCOUNTER — PATIENT OUTREACH (OUTPATIENT)
Dept: CARE COORDINATION | Facility: CLINIC | Age: 74
End: 2024-12-16
Payer: MEDICARE

## 2024-12-16 NOTE — PROGRESS NOTES
First attempt made to reach patient for transitions of care outreach and no contact made with patient. Left voicemail with my name and contact information.       Discharge Diagnosis  Closed displaced fracture of right acetabulum (Multi)     Issues Requiring Follow-Up  Orthopedic Surgeon: Postoperative followup with Dr. Benitez  PCP Followup    Thank you,   Violeta Chacon , RN   Nurse Care Manager   [facility] Department   [phone]

## 2024-12-18 ENCOUNTER — PATIENT OUTREACH (OUTPATIENT)
Dept: CARE COORDINATION | Facility: CLINIC | Age: 74
End: 2024-12-18
Payer: MEDICARE

## 2024-12-18 NOTE — PROGRESS NOTES
Second attempt made to reach patient for transitions of care outreach call  and no contact made with patient. Left voicemail message with my name and contact information. Will enroll patient in transtions of care and outreach again to follow up on PCP appointment or primary consult -orthopedic surgeon.     PT is coming out today they are coming out today to share with therapist  DME -has walker , grabber , shower seat, raised toilet seat  Has 24/7 support from friend at home.     Discharge Diagnosis  Closed displaced fracture of right acetabulum (Multi)     Issues Requiring Follow-Up  Orthopedic Surgeon: Postoperative followup with Dr. Benitez  PCP Followup     Medications  Medications reviewed with patient/caregiver?: Yes (12/18/2024 10:39 AM)  Is the patient having any side effects they believe may be caused by any medication additions or changes?: No (12/18/2024 10:39 AM)  Does the patient have all medications ordered at discharge?: Yes (12/18/2024 10:39 AM)  Care Management Interventions: Provided patient education (12/18/2024 10:39 AM)  Is the patient taking all medications as directed (includes completed medication regime)?: Yes (12/18/2024 10:39 AM)  Care Management Interventions: Provided patient education (12/18/2024 10:39 AM)    Appointments  Does the patient have a primary care provider?: Yes (12/18/2024 10:39 AM)  Care Management Interventions: Educated patient on importance of making appointment (12/18/2024 10:39 AM)  Has the patient kept scheduled appointments due by today?: Yes (12/18/2024 10:39 AM)    Self Management  Has home health visited the patient within 72 hours of discharge?: Yes (12/18/2024 10:39 AM)    Patient Teaching  Does the patient have access to their discharge instructions?: Yes (12/18/2024 10:39 AM)  Care Management Interventions: Reviewed instructions with patient (12/18/2024 10:39 AM)  What is the patient's perception of their health status since discharge?: Improving (12/18/2024  10:39 AM)  Is the patient/caregiver able to teach back the hierarchy of who to call/visit for symptoms/problems? PCP, Specialist, Home Health nurse, Urgent Care, ED, 911: Yes (12/18/2024 10:39 AM)  Patient/Caregiver Education Comments: no issues with intake and output. Educated on miralax (12/18/2024 10:39 AM)        Thank you,  Violeta Chacon , RN   Nurse Care Manager   [facility] Department   [phone]

## 2024-12-19 ENCOUNTER — TELEPHONE (OUTPATIENT)
Dept: PRIMARY CARE | Facility: CLINIC | Age: 74
End: 2024-12-19
Payer: MEDICARE

## 2024-12-19 NOTE — TELEPHONE ENCOUNTER
Mercy from [facility] calling requesting to know if PCP will follow with home care for patient. They stated they can be contacted at [phone].

## 2025-01-02 ENCOUNTER — TELEPHONE (OUTPATIENT)
Dept: PRIMARY CARE | Facility: CLINIC | Age: 75
End: 2025-01-02

## 2025-01-02 ENCOUNTER — TELEMEDICINE (OUTPATIENT)
Dept: PRIMARY CARE | Facility: CLINIC | Age: 75
End: 2025-01-02
Payer: MEDICARE

## 2025-01-02 ENCOUNTER — HOSPITAL ENCOUNTER (EMERGENCY)
Facility: HOSPITAL | Age: 75
Discharge: HOME | End: 2025-01-02
Attending: EMERGENCY MEDICINE
Payer: MEDICARE

## 2025-01-02 ENCOUNTER — APPOINTMENT (OUTPATIENT)
Dept: RADIOLOGY | Facility: HOSPITAL | Age: 75
End: 2025-01-02
Payer: MEDICARE

## 2025-01-02 VITALS
DIASTOLIC BLOOD PRESSURE: 69 MMHG | SYSTOLIC BLOOD PRESSURE: 110 MMHG | HEART RATE: 72 BPM | TEMPERATURE: 97.5 F | BODY MASS INDEX: 24.11 KG/M2 | RESPIRATION RATE: 18 BRPM | WEIGHT: 150 LBS | OXYGEN SATURATION: 96 % | HEIGHT: 66 IN

## 2025-01-02 DIAGNOSIS — O22.30 DVT (DEEP VEIN THROMBOSIS) IN PREGNANCY (HHS-HCC): Primary | ICD-10-CM

## 2025-01-02 DIAGNOSIS — I82.411 ACUTE DEEP VEIN THROMBOSIS (DVT) OF FEMORAL VEIN OF RIGHT LOWER EXTREMITY (MULTI): ICD-10-CM

## 2025-01-02 DIAGNOSIS — M79.89 SWELLING OF RIGHT FOOT: ICD-10-CM

## 2025-01-02 DIAGNOSIS — S32.401A CLOSED DISPLACED FRACTURE OF RIGHT ACETABULUM, UNSPECIFIED PORTION OF ACETABULUM, INITIAL ENCOUNTER (MULTI): ICD-10-CM

## 2025-01-02 DIAGNOSIS — I68.0 CEREBRAL AMYLOID ANGIOPATHY (CODE): ICD-10-CM

## 2025-01-02 DIAGNOSIS — I82.451 ACUTE DEEP VEIN THROMBOSIS (DVT) OF RIGHT PERONEAL VEIN (MULTI): Primary | ICD-10-CM

## 2025-01-02 PROCEDURE — 93971 EXTREMITY STUDY: CPT | Mod: FOREIGN READ | Performed by: RADIOLOGY

## 2025-01-02 PROCEDURE — 1111F DSCHRG MED/CURRENT MED MERGE: CPT | Performed by: INTERNAL MEDICINE

## 2025-01-02 PROCEDURE — 99213 OFFICE O/P EST LOW 20 MIN: CPT | Performed by: INTERNAL MEDICINE

## 2025-01-02 PROCEDURE — 93971 EXTREMITY STUDY: CPT

## 2025-01-02 PROCEDURE — 99284 EMERGENCY DEPT VISIT MOD MDM: CPT | Mod: 25 | Performed by: EMERGENCY MEDICINE

## 2025-01-02 RX ORDER — OXYCODONE AND ACETAMINOPHEN 5; 325 MG/1; MG/1
1 TABLET ORAL ONCE
Status: DISCONTINUED | OUTPATIENT
Start: 2025-01-02 | End: 2025-01-02

## 2025-01-02 ASSESSMENT — COLUMBIA-SUICIDE SEVERITY RATING SCALE - C-SSRS
2. HAVE YOU ACTUALLY HAD ANY THOUGHTS OF KILLING YOURSELF?: NO
1. IN THE PAST MONTH, HAVE YOU WISHED YOU WERE DEAD OR WISHED YOU COULD GO TO SLEEP AND NOT WAKE UP?: NO
6. HAVE YOU EVER DONE ANYTHING, STARTED TO DO ANYTHING, OR PREPARED TO DO ANYTHING TO END YOUR LIFE?: NO

## 2025-01-02 ASSESSMENT — PAIN DESCRIPTION - PAIN TYPE: TYPE: ACUTE PAIN

## 2025-01-02 ASSESSMENT — PAIN DESCRIPTION - DESCRIPTORS: DESCRIPTORS: ACHING

## 2025-01-02 ASSESSMENT — PAIN DESCRIPTION - LOCATION: LOCATION: LEG

## 2025-01-02 ASSESSMENT — PAIN DESCRIPTION - ORIENTATION: ORIENTATION: RIGHT

## 2025-01-02 ASSESSMENT — PAIN - FUNCTIONAL ASSESSMENT: PAIN_FUNCTIONAL_ASSESSMENT: 0-10

## 2025-01-02 ASSESSMENT — PAIN DESCRIPTION - FREQUENCY: FREQUENCY: CONSTANT/CONTINUOUS

## 2025-01-02 ASSESSMENT — PAIN SCALES - GENERAL: PAINLEVEL_OUTOF10: 5 - MODERATE PAIN

## 2025-01-02 NOTE — ED PROVIDER NOTES
HPI     CC: Foot Swelling     HPI: Sue Carter is a 74 y.o. female with past medical history of brain bleed, DVT presents with complaints of left lower extremity swelling x 1 week and a half.  She endorses associated numbness.  She reports she had a hip replacement on 12/11/2014 and has been having symptoms ever since.  She is not on a blood thinner reports has a history of a brain bleed.  She does report DVT in her left lower extremity with a filter.  She denies calf pain, erythema,  Warmth. Denies chest pain, sob, palpitations, dyspnea.       ROS: 10-point review of systems was performed and is otherwise negative except as noted in HPI.      Past Medical History: Noncontributory except per HPI     Past Surgical History: Noncontributory except per HPI     Family History: Reviewed and noncontributory     Social History:  Noncontributory except per HPI       No Known Allergies    No past medical history on file.    Home Meds:   Current Outpatient Medications   Medication Instructions    acetaminophen (TYLENOL) 975 mg, oral, Every 8 hours PRN    aspirin 81 mg, oral, 2 times daily    calcium carbonate-vitamin D3 500 mg-5 mcg (200 unit) tablet 1 tablet, oral, 2 times daily    ibuprofen 200 mg, oral, Every 6 hours scheduled    lidocaine 4 % patch 1 patch, transdermal, Daily, Remove & discard patch within 12 hours or as directed by MD.    melatonin 3 mg, oral, Nightly PRN    methocarbamol (ROBAXIN) 500 mg, oral, Every 8 hours scheduled    multivitamin with minerals tablet 1 tablet, oral, Daily    oxyCODONE (ROXICODONE) 5 mg, oral, Every 4 hours PRN    polyethylene glycol (GLYCOLAX, MIRALAX) 17 g, oral, 2 times daily    sennosides-docusate sodium (Yasmeen-Colace) 8.6-50 mg tablet 2 tablets, oral, 2 times daily        ED Triage Vitals [01/02/25 1402]   Temperature Heart Rate Respirations BP   36.4 °C (97.5 °F) 70 16 109/58      Pulse Ox Temp Source Heart Rate Source Patient Position   95 % Temporal -- --      BP Location FiO2  "(%)     -- --         Heart Rate:  [70]   Temperature:  [36.4 °C (97.5 °F)]   Respirations:  [16]   BP: (109)/(58)   Height:  [167.6 cm (5' 6\")]   Weight:  [68 kg (150 lb)]   Pulse Ox:  [95 %]      Physical Exam:  Physical Exam  Vitals and nursing note reviewed.   Constitutional:       General: She is not in acute distress.     Appearance: She is well-developed.   HENT:      Head: Normocephalic and atraumatic.   Eyes:      Conjunctiva/sclera: Conjunctivae normal.   Cardiovascular:      Rate and Rhythm: Normal rate and regular rhythm.      Heart sounds: No murmur heard.  Pulmonary:      Effort: Pulmonary effort is normal. No respiratory distress.      Breath sounds: Normal breath sounds.   Abdominal:      Palpations: Abdomen is soft.      Tenderness: There is no abdominal tenderness.   Musculoskeletal:         General: No swelling.      Cervical back: Neck supple.   Skin:     General: Skin is warm and dry.      Capillary Refill: Capillary refill takes less than 2 seconds.   Neurological:      Mental Status: She is alert.   Psychiatric:         Mood and Affect: Mood normal.          Diagnostic Results        Labs Reviewed - No data to display      Lower extremity venous duplex right    (Results Pending)                 [place] Coma Scale Score: 15                  Procedure  Procedures    ED Course & MDM   Assessment/Plan:     Medications - No data to display     Diagnoses as of 01/02/25 1934   Acute deep vein thrombosis (DVT) of right peroneal vein (Multi)   Acute deep vein thrombosis (DVT) of femoral vein of right lower extremity (Multi)   Swelling of right foot       Medical Decision Making    Sue Carter is a 74 y.o. female independent historian with history of left lower extremity DVT with filter presents with complaints of RLE swelling hip replacement 12/11/2012.  She is not currently on a blood thinner due to history of bleed. She denies calf pain, erythema, warmth.  Denies chest pain, sob, palpitations, " dyspnea.     Differential diagnosis DVT, peripheral edema, heart failure, fracture, dislocation, PVD    On exam she is alert and oriented X3, NAD noted. Afebrile, VSS. Lungs clear throughout. Heart RRR. Abdomen soft non tender, non distended, BSX4. Normal ROM.  No palpable pain noted.  No erythema, warmth, or swelling noted on exam in RLE.  DP 2+.  Sensation intact. No plhlegmnasia noted in RLE.     Given hx of DVT, IVC filter and no use of blood thinners an Ultrasound of right lower extremity obtained and interpreted by me it is positive for clot in common femoral vein and peroneal vein.    I discussed this patient's care with Dr Cee who also evaluated the patient    I discussed findings with patient and they are safe for discharge and outpatient treatment. We spoke in depth about signs to look for increased swelling, erythema, pain in the RLE to return to ED for further evaluation. She is advised to follow up with Dr. Awan in 2-3 days for further evaluation.  Watch for Chest pain, shortness of breath, difficulty breathing, persistent non productive cough  Pain and swelling: Pain and swelling in one or both legs, especially in the calf or thigh   Redness and warmth: The skin may be red, discolored, or feel warm to the touch   Tenderness: The leg may be tender to the touch   Rope-like cord: A rope-like cord may be felt under the skin   Aching: The leg may ache when walking, but feel better when elevated   Low-grade fever: A low-grade fever may occur    Follow up instructions discussed. ED precautions discussed and advised to return to ED if symptoms worsen or persist for follow up.    Counseling: Spoke with the patient and discussed today´s findings, in addition to providing specific details for the plan of care and expected course. Patient was given the opportunity to ask questions     She expressed understanding was agreeable with plan and discharge at this time. Discharged in hemodynamically stable  condition.    Disposition: Home    ED Prescriptions    None         Social Determinants Affecting Care: none     EMILY Chapin    This note was dictated by speech recognition. Minor errors in transcription may be present.     EMILY Chapin  01/02/25 1938

## 2025-01-02 NOTE — ED TRIAGE NOTES
Pt states she had hip surgery on 12/11/24 and started having increased swelling and pain in the right foot over the last week. Pt alert and oriented x 4.

## 2025-01-02 NOTE — PROGRESS NOTES
Subjective   Sue Carter is a 74 y.o. female who presents for a telephone visit.  HPI  74-year-old female with recommended patient.  Cerebral Amyloid angiopathy with related inflammation follows at [facility]  Dr Mendez at [facility] , status post right hip hip surgery for close displaced fracture December 11, 2024, patient complaining of swelling in the right lower extremity foot and hip area.  She is a high risk patient, due to increase risk of bleeding with  cerebral amyloid angiopathy, I believe patient has a [place] filter.  She was sent to the emergency room for further evaluation      Review of Systems  10 system review pertinent as above  Objective   Telephone visit  There were no vitals taken for this visit.       Physical Exam  Telephone visit  Assessment/Plan     Telephone visit more than 11 minutes less than 20 minutes    Status post closed displaced fracture right  Was seen by physical therapy today  Recommended patient call me  Suspect deep vein thrombosis  This is a highly complicated patient  With a history of cerebral amyloid angiopathy  Which she follows at the [facility]  High risk of bleeding.  I believe she does have a [place] filter  Patient was sent to the emergency room for acute evaluation  Of the possibility of vein thrombosis.      History of cerebral amyloid angiopathy  With related inflammation  Diagnosed incidentally in 2020 December  Follows with Dr. Mendez [facility]  Completed steroids SP repeat MRI 05/05/2024  Recently repeated MRI Friday 10/25/2024  Personally reviewed with patient  We will also review with neurosurgery at the [facility]  Patient will see neurosurgery Will review at  with Dr Mendez  Patient is instructed to follow regularly    History of  Fasting blood work BMP    Prevention  Colonoscopy 05/05/2022 next in 2027  Mammogram 02/07/2024  Bone density 2021[facility]    EKG 2019 NSR Nl EKG    CACS score =47 LAD low risk LDL 90 Mg/Dl 02/07/24  In setting of Fhx  CAD  Father age 53 Se Fhx above  No angina with exercise    Cholesterol 2022 October 173, LDL 97    Immunizations  Flu vaccine 12/2023  Pneumonia vaccine 05/2016   Shingles vaccine declined  [place] vaccine 2/2 and 3 boosters  RSV declined      Hx DVT 2020  [place] filter  Was no removed  Will follow with [facility] Dr GÓMEZ Mccoy  Doing well  No asa due to CAA above  Problem List Items Addressed This Visit    None            Yuval Gonzalez MD

## 2025-01-03 NOTE — DISCHARGE INSTRUCTIONS
Follow up with Cardiologist Dr. Liu within two days for further evaluation and management of care    Watch for Chest pain, shortness of breath, difficulty breathing, persistent non productive cough  Pain and swelling: Pain and swelling in one or both legs, especially in the calf or thigh   Redness and warmth: The skin may be red, discolored, or feel warm to the touch   Tenderness: The leg may be tender to the touch   Rope-like cord: A rope-like cord may be felt under the skin   Aching: The leg may ache when walking, but feel better when elevated   Low-grade fever: A low-grade fever may occur    Follow up instructions discussed. ED precautions discussed and advised to return to ED if symptoms worsen or persist for follow up.

## 2025-01-03 NOTE — TELEPHONE ENCOUNTER
Sue would like a call back in regards to her going to the hospital for the blood clot but they was not able to do anything about due to her having brain bleed. Can you contact her about the next steps?

## 2025-01-06 ENCOUNTER — PATIENT OUTREACH (OUTPATIENT)
Dept: CARE COORDINATION | Facility: CLINIC | Age: 75
End: 2025-01-06
Payer: MEDICARE

## 2025-01-16 ENCOUNTER — OFFICE VISIT (OUTPATIENT)
Dept: ORTHOPEDIC SURGERY | Facility: CLINIC | Age: 75
End: 2025-01-16
Payer: MEDICARE

## 2025-01-16 ENCOUNTER — PATIENT OUTREACH (OUTPATIENT)
Dept: CARE COORDINATION | Facility: CLINIC | Age: 75
End: 2025-01-16

## 2025-01-16 ENCOUNTER — HOSPITAL ENCOUNTER (OUTPATIENT)
Dept: RADIOLOGY | Facility: CLINIC | Age: 75
Discharge: HOME | End: 2025-01-16
Payer: MEDICARE

## 2025-01-16 DIAGNOSIS — S32.401A CLOSED DISPLACED FRACTURE OF RIGHT ACETABULUM, UNSPECIFIED PORTION OF ACETABULUM, INITIAL ENCOUNTER (MULTI): ICD-10-CM

## 2025-01-16 PROCEDURE — 1159F MED LIST DOCD IN RCRD: CPT | Performed by: PHYSICIAN ASSISTANT

## 2025-01-16 PROCEDURE — 99211 OFF/OP EST MAY X REQ PHY/QHP: CPT | Performed by: PHYSICIAN ASSISTANT

## 2025-01-16 PROCEDURE — 72190 X-RAY EXAM OF PELVIS: CPT

## 2025-01-16 ASSESSMENT — PAIN - FUNCTIONAL ASSESSMENT: PAIN_FUNCTIONAL_ASSESSMENT: NO/DENIES PAIN

## 2025-01-16 NOTE — PROGRESS NOTES
Patient is a 74 y.o. female who is 5 weeks s/p ORIF R acetabulum-PW/PC .  Date of surgery was 12/11/2024.  Patient continues FFWB RLE 30 lbs at this time with use of walker and denies issues with incision. Patient continues on ASA for DVT ppx. Patient continues with therapy sessions, performing exercise program at home. She is currently staying with a friend who has a 1 level floor plan. Patient denies fever or chills, N/T or calf pain.     General: Alert and oriented x 3, NAD, respirations easy and unlabored with no audible wheezes, skin warm and dry, speech and dress appropriate for noted age, affect euthymic.     Musculoskeletal: RLE/pelvis  incisions c/d/i  mild swelling to lower leg  compartments soft  no calf tenderness  sensation intact to light touch  motor intact including TA/GS/EHL  palpable DP/PT pulses 2+   Hip motion stiff and sore    X-ray: Images of pelvis reviewed personally by me today and reveal maintenance of alignment of R acetabulum PW/PC fractures with hardware in position and no interval change.      IMP:  Problem List Items Addressed This Visit       Closed displaced fracture of right acetabulum (Multi)       PLAN:  She will continue with FFWB 30 lbs using walker. No stairs yet. I did provide her with an acetabular protocol for [facility] PT to follow. Continue with ASA for DVT ppx as ordered. I will see patient back in 4 weeks and I need x-rays AP pelvis with Judet views R acetabulum next visit. All questions were answered today.

## 2025-01-16 NOTE — PROGRESS NOTES
Outreach call to patient to check in 30 days after hospital discharge to support smooth transition of care.  No answer at home phone number.   Patient followed with orthopedic surgery.     PLAN per orthopedic surgery   She will continue with FFWB 30 lbs using walker. No stairs yet. I did provide her with an acetabular protocol for [facility] PT to follow. Continue with ASA for DVT ppx as ordered. I will see patient back in 4 weeks and I need x-rays AP pelvis with Judet views R acetabulum next visit.    Thank you,

## 2025-01-31 DIAGNOSIS — M81.0 AGE-RELATED OSTEOPOROSIS WITHOUT CURRENT PATHOLOGICAL FRACTURE: Primary | ICD-10-CM

## 2025-02-11 NOTE — PROGRESS NOTES
Subjective   Sue Carter is a 74 y.o. female who presents for here for follow-up.  HPI  73-year-old female with recommended patient.  Cerebral Amyloid angiopathy with related inflammation   completed steroid therapy follows at [facility]  Dr Mendez at [facility] ,concerned one episode of chest pain one episodes lasting one hour and gone,  no new episodes reported, patient is active with no major medical denies chest pain shortness of breath fever chill nausea vomiting constipation diarrhea dysuria urgency frequency.  Patient was seen for physical exam and blood work in February 7, 2024 patient is with PT at home.       Review of Systems  10 system review pertinent as above  Objective     There were no vitals taken for this visit.       Physical Exam  HEENT: Atraumatic normocephalic the pupils are equal and round and reactive to light the sclerae nonicteric extraocular motion are intact.  Neck: Is supple without JVD no carotid bruits the trachea is midline there are no masses pulses are equal and bilateral with normal upstroke.  Skin: Normal.  Skin good texture.  Moist.  Good turgor.  No lesions, no rashes.  Lymph: No lymphadenopathy appreciated, no masses, no lesions  Lungs: Are clear to auscultation and percussion, good breath sounds bilaterally, no rhonchi, no wheezing, good diaphragmatic excursion.  Heart: Normal rate and normal rhythm S1, S2, no S3, no gallop, murmur or rub.  Abdomen: Soft, nontender, no organomegaly, good bowel sounds.    Extremities: Full range of motion, good pulses bilateral.  No cyanosis, no clubbing or edema.  Neuro: Cranial nerves II-XII are grossly intact there is no sensory or motor deficits.  Able to move all extremities.      Assessment/Plan         Patient is here for a follow-up    Sp acetabular Fx Rt  Repair Dr Benitez [facility]  Physical therapy per Ortho recommendation    ASA con for now until   Mobility regained  Cont with PT For pain  Take tylenol 500 mg every six hours as needed  PRN Motrin 200 mg  tid for      History of cerebral amyloid angiopathy  With related inflammation  Diagnosed incidentally in 2020 December  Follows with Dr. Mendez [facility]  Completed steroids SP repeat MRI 05/05/2024  Recently repeated MRI Friday 10/25/2024  Personally reviewed with patient  We will also review with neurosurgery at the [facility]  Patient will see neurosurgery Will review at  with Dr Mendez  Patient is instructed to follow regularly    History of  Fasting blood work BMP    Prevention  Colonoscopy 05/05/2022 next in 2027  Mammogram 02/07/2024  Bone density 2021[facility]    EKG 2019 NSR Nl EKG    CACS score =47 LAD low risk LDL 90 Mg/Dl 02/07/24  In setting of Fhx CAD  Father age 53 Se Fhx above  No angina with exercise    Cholesterol 2022 October 173, LDL 97    Immunizations  Flu vaccine 12/2023  Pneumonia vaccine 05/2016   Shingles vaccine declined  [place] vaccine 2/2 and 3 boosters  RSV declined      Hx DVT 2020  [place] filter  Was no removed  Will follow with [facility] Dr GÓMEZ Mccoy  Doing well  No asa due to CAA above    Problem List Items Addressed This Visit    None            Yuval Gonzalez MD

## 2025-02-12 ENCOUNTER — APPOINTMENT (OUTPATIENT)
Dept: PRIMARY CARE | Facility: CLINIC | Age: 75
End: 2025-02-12
Payer: MEDICARE

## 2025-02-12 VITALS — HEIGHT: 66 IN | BODY MASS INDEX: 24.21 KG/M2

## 2025-02-12 DIAGNOSIS — E85.4 ORGAN-LIMITED AMYLOIDOSIS: Primary | ICD-10-CM

## 2025-02-12 DIAGNOSIS — S72.001D CLOSED FRACTURE OF RIGHT HIP WITH ROUTINE HEALING, SUBSEQUENT ENCOUNTER: ICD-10-CM

## 2025-02-12 DIAGNOSIS — M81.0 AGE-RELATED OSTEOPOROSIS WITHOUT CURRENT PATHOLOGICAL FRACTURE: ICD-10-CM

## 2025-02-12 DIAGNOSIS — O22.30 DVT (DEEP VEIN THROMBOSIS) IN PREGNANCY (HHS-HCC): ICD-10-CM

## 2025-02-12 DIAGNOSIS — I68.0 CEREBRAL AMYLOID ANGIOPATHY (CODE): ICD-10-CM

## 2025-02-12 PROCEDURE — 1123F ACP DISCUSS/DSCN MKR DOCD: CPT | Performed by: INTERNAL MEDICINE

## 2025-02-12 PROCEDURE — 1158F ADVNC CARE PLAN TLK DOCD: CPT | Performed by: INTERNAL MEDICINE

## 2025-02-12 PROCEDURE — 1160F RVW MEDS BY RX/DR IN RCRD: CPT | Performed by: INTERNAL MEDICINE

## 2025-02-12 PROCEDURE — 99214 OFFICE O/P EST MOD 30 MIN: CPT | Performed by: INTERNAL MEDICINE

## 2025-02-12 PROCEDURE — 1159F MED LIST DOCD IN RCRD: CPT | Performed by: INTERNAL MEDICINE

## 2025-02-12 PROCEDURE — G2211 COMPLEX E/M VISIT ADD ON: HCPCS | Performed by: INTERNAL MEDICINE

## 2025-02-12 ASSESSMENT — ENCOUNTER SYMPTOMS
DEPRESSION: 0
LOSS OF SENSATION IN FEET: 0
OCCASIONAL FEELINGS OF UNSTEADINESS: 0

## 2025-02-13 ENCOUNTER — APPOINTMENT (OUTPATIENT)
Dept: ORTHOPEDIC SURGERY | Facility: CLINIC | Age: 75
End: 2025-02-13
Payer: MEDICARE

## 2025-02-13 ENCOUNTER — APPOINTMENT (OUTPATIENT)
Dept: RADIOLOGY | Facility: CLINIC | Age: 75
End: 2025-02-13
Payer: MEDICARE

## 2025-02-13 DIAGNOSIS — S32.401A CLOSED DISPLACED FRACTURE OF RIGHT ACETABULUM, UNSPECIFIED PORTION OF ACETABULUM, INITIAL ENCOUNTER (MULTI): ICD-10-CM

## 2025-02-18 ENCOUNTER — APPOINTMENT (OUTPATIENT)
Dept: ORTHOPEDIC SURGERY | Age: 75
End: 2025-02-18
Payer: MEDICARE

## 2025-02-18 VITALS — HEIGHT: 66 IN | BODY MASS INDEX: 24.11 KG/M2 | WEIGHT: 150 LBS

## 2025-02-18 DIAGNOSIS — S32.401A CLOSED DISPLACED FRACTURE OF RIGHT ACETABULUM, UNSPECIFIED PORTION OF ACETABULUM, INITIAL ENCOUNTER (MULTI): Primary | ICD-10-CM

## 2025-02-18 PROCEDURE — 1036F TOBACCO NON-USER: CPT | Performed by: PHYSICIAN ASSISTANT

## 2025-02-18 PROCEDURE — 99024 POSTOP FOLLOW-UP VISIT: CPT | Performed by: PHYSICIAN ASSISTANT

## 2025-02-18 NOTE — PROGRESS NOTES
"Chief Complaint:    Chief Complaint   Patient presents with   • Flank Pain       Vital Signs:   Ht 157.5 cm (62.01\")   Wt 72.6 kg (160 lb)   BMI 29.26 kg/m²   Body mass index is 29.26 kg/m².      HPI:  Olivia Richmond is a 45 y.o. female who presents today for follow up    History of Present Illness  Ms. Richmond presents to the clinic for a follow-up for nephrolithiasis.  He has been seen by Dr. Friedman in [place] in the past for nephrolithiasis and has underwent surgery for stone removals. Patient had a CT scan completed roughly 1 month ago revealed multiple nonobstructing bilateral renal stones.  Most stones were punctate and the largest stone was roughly 5 mm in the right upper to middle renal pole.  CT scan also showed a nodular density on her left breast that she is scheduled to undergo mammogram for.  Patient states that she currently still has significant right-sided flank and back pain with pressure with urination.  Denies fever, chills, vomiting, gross hematuria, or inability urinate.  Starting Flomax today.  Repeat KUB today shows only 5 mm right renal calculus demonstrated on CT scan.  There are no renal stones inside throughout the ureters.  She is unable to urinate in office for us today.      Past Medical History:  Past Medical History:   Diagnosis Date   • Arthritis     back   • Complicated UTI (urinary tract infection)    • Disease of thyroid gland     nodules   • GERD (gastroesophageal reflux disease)    • Insomnia    • Kidney stones    • Migraine    • Migraines    • Pneumonia    • Polycystic kidney disease    • PONV (postoperative nausea and vomiting)    • Strep throat        Current Meds:  Current Outpatient Medications   Medication Sig Dispense Refill   • amitriptyline (ELAVIL) 25 MG tablet Take 25 mg by mouth every night.     • baclofen (LIORESAL) 10 MG tablet Take 10 mg by mouth 3 (Three) Times a Day.     • cefdinir (OMNICEF) 300 MG capsule Take 1 capsule by mouth 2 (Two) Times a Day. 14 capsule 0 " Pt is a 76 yr old Female presenting today for 4 wk follow up of Closed displaced fracture of right acetabulum.  Pt is 10 weeks post op.   Date of surgery 12/11/2024  Pt has been doing home exercises and still using walker.   Pt states that she is doing well. Still taking tylenol for pain. Experiencing some numbness and swelling in her right leg and foot.    Patient is a 74 y.o. female who is 10 weeks s/p ORIF R acetabulum-PW/PC .  Date of surgery was 12/11/2024.  Patient continues FFWB RLE 30 lbs at this time with use of walker and denies issues with incision. Patient completed ASA for DVT ppx. Patient states her [facility] PT sessions ended on Friday and she is eager to continue in PT as outpatient. She is currently staying with a friend who has a 1 level floor plan. Patient denies fever or chills, N/T or calf pain.     General: Alert and oriented x 3, NAD, respirations easy and unlabored with no audible wheezes, skin warm and dry, speech and dress appropriate for noted age, affect euthymic.     Musculoskeletal: RLE/pelvis  incisions well-healed  mild swelling to foot/ankle  compartments soft  no calf tenderness  sensation intact to light touch  motor intact including TA/GS/EHL  palpable DP/PT pulses 2+   Hip motion improving, nontender     X-ray: Images of pelvis reviewed personally by me today and reveal maintenance of alignment of R acetabulum PW/PC fractures with hardware in position and interval callus forming with signs of healing    IMP:  Problem List Items Addressed This Visit       Closed displaced fracture of right acetabulum, unspecified portion of acetabulum, initial encounter (Multi) - Primary    Relevant Orders    XR pelvis 3+ views    Referral to Physical Therapy       PLAN:  She may advance to PWB 50% RLE using walker and I have referred her to outpatient PT. She will maintain the PWB until March 12th and then may advance to WBAT RLE with walker at that time. I did provide her with an updated acetabular    • clonazePAM (KlonoPIN) 0.5 MG tablet Take 0.5 mg by mouth 2 (two) times a day as needed for seizures.     • gabapentin (NEURONTIN) 400 MG capsule Take 400 mg by mouth 4 (Four) Times a Day.     • ketorolac (TORADOL) 10 MG tablet Take 1 tablet by mouth Every 6 (Six) Hours As Needed for Moderate Pain. 16 tablet 2   • ondansetron ODT (ZOFRAN-ODT) 4 MG disintegrating tablet Place 1 tablet on the tongue Every 6 (Six) Hours As Needed for Nausea or Vomiting. 30 tablet 0   • orphenadrine (NORFLEX) 100 MG 12 hr tablet Take 1 tablet by mouth 2 (Two) Times a Day. 20 tablet 0   • phenazopyridine (PYRIDIUM) 200 MG tablet Take 1 tablet by mouth 3 (Three) Times a Day As Needed for Bladder Spasms. 20 tablet 0   • polyethylene glycol (MIRALAX) 17 g packet Take 17 g by mouth Daily. 40 packet 0   • promethazine (PHENERGAN) 25 MG tablet Take 1 tablet by mouth Every 6 (Six) Hours As Needed for Nausea or Vomiting. 21 tablet 2   • tamsulosin (FLOMAX) 0.4 MG capsule 24 hr capsule Take 1 capsule by mouth Daily. 30 capsule 0   • traMADol (ULTRAM) 50 MG tablet Take 1 tablet by mouth Every 6 (Six) Hours As Needed for Moderate Pain . 60 tablet 2     No current facility-administered medications for this visit.        Allergies:   Allergies   Allergen Reactions   • Sulfa Antibiotics Anaphylaxis   • Compazine [Prochlorperazine Edisylate] Hives        Past Surgical History:  Past Surgical History:   Procedure Laterality Date   • ABDOMINAL SURGERY     •  SECTION     • COLONOSCOPY     • CYSTOSCOPY     • CYSTOSCOPY ELECTROHYDRAULIC LITHOTRIPSY     • CYSTOSCOPY URETEROSCOPY LASER LITHOTRIPSY Left 2020    Procedure: CYSTOSCOPY URETEROSCOPY LASER LITHOTRIPSY;  Surgeon: Garth Friedman MD;  Location: [facility];  Service: Urology;  Laterality: Left;   • DENTAL PROCEDURE     • DILATATION AND CURETTAGE     • DILATATION AND CURETTAGE     • ENDOSCOPY     • EXTRACORPOREAL SHOCK WAVE LITHOTRIPSY (ESWL) Left 2018    Procedure:  protocol for PT to follow. I will see patient back in 6 weeks and I need x-rays AP pelvis with Judet views R acetabulum next visit. All questions were answered today.    EXTRACORPOREAL SHOCKWAVE LITHOTRIPSY;  Surgeon: Garth Friedman MD;  Location: [facility] OR;  Service: Urology   • EXTRACORPOREAL SHOCK WAVE LITHOTRIPSY (ESWL) Left 5/15/2020    Procedure: EXTRACORPOREAL SHOCKWAVE LITHOTRIPSY;  Surgeon: Garth Friedman MD;  Location: [facility];  Service: Urology;  Laterality: Left;   • KIDNEY STONE SURGERY     • LITHOTRIPSY         Social History:  Social History     Socioeconomic History   • Marital status:    Tobacco Use   • Smoking status: Former     Packs/day: 0.50     Years: 15.00     Pack years: 7.50     Types: Cigarettes     Quit date: 2018     Years since quittin.8   • Smokeless tobacco: Never   Vaping Use   • Vaping Use: Never used   Substance and Sexual Activity   • Alcohol use: No   • Drug use: No   • Sexual activity: Yes     Partners: Male     Birth control/protection: None       Family History:  Family History   Problem Relation Age of Onset   • No Known Problems Father    • No Known Problems Mother        Review of Systems:  Review of Systems   Constitutional: Negative for chills, fatigue and fever.   Respiratory: Negative for cough, shortness of breath and wheezing.    Cardiovascular: Negative for leg swelling.   Gastrointestinal: Positive for abdominal pain, nausea and vomiting.   Genitourinary: Positive for flank pain. Negative for difficulty urinating, dysuria, hematuria and urgency.   Musculoskeletal: Negative for back pain and joint swelling.   Neurological: Negative for dizziness and headaches.       Physical Exam:  Physical Exam  Constitutional:       General: She is not in acute distress.     Appearance: Normal appearance.   HENT:      Head: Normocephalic and atraumatic.      Nose: Nose normal.      Mouth/Throat:      Mouth: Mucous membranes are moist.   Eyes:      Conjunctiva/sclera: Conjunctivae normal.   Cardiovascular:      Rate and Rhythm: Normal rate and regular rhythm.      Pulses: Normal pulses.      Heart sounds: Normal  heart sounds.   Pulmonary:      Effort: Pulmonary effort is normal.      Breath sounds: Normal breath sounds.   Abdominal:      General: Bowel sounds are normal.      Palpations: Abdomen is soft.   Musculoskeletal:         General: Normal range of motion.      Cervical back: Normal range of motion.   Skin:     General: Skin is warm.   Neurological:      General: No focal deficit present.      Mental Status: She is alert and oriented to person, place, and time.   Psychiatric:         Mood and Affect: Mood normal.         Behavior: Behavior normal.         Thought Content: Thought content normal.         Judgment: Judgment normal.           Recent Image (CT and/or KUB):   CT Abdomen and Pelvis: No results found for this or any previous visit.     CT Stone Protocol: Results for orders placed during the hospital encounter of 10/11/22    CT Abdomen Pelvis Stone Protocol    Narrative  EXAM: CT ABDOMEN PELVIS STONE PROTOCOL-      TECHNIQUE: Multiple axial CT images were obtained from lung bases  through pubic symphysis WITHOUT administration of IV contrast.  Reformatted images in the coronal and/or sagittal plane(s) were  generated from the axial data set to facilitate diagnostic accuracy  and/or surgical planning.  Oral Contrast:NONE.    Radiation dose reduction techniques were utilized per ALARA protocol.  Automated exposure control was initiated through either or CareDose or  DoseRight software packages by  protocol.  DOSE:    Clinical information Flank pain, kidney stone suspected    Comparison 10/25/2021    FINDINGS:    Lower thorax: Vague nodular density in the left breast recommend  correlation with mammography    Abdomen:    Liver: Homogeneous. No focal hepatic mass or ductal dilatation.    Gallbladder: Not visualized    Pancreas: Unremarkable. No mass or ductal dilatation.    Spleen: Homogeneous. No splenomegaly.    Adrenals: No mass.    Kidneys/ureters: Nonobstructing stones are present within both  kidneys.    GI tract: Moderate to large stool burden. There is no evidence of  appendicitis    MESENTERY: No free fluid, walled off fluid collections, mesenteric  stranding, or enlarged lymph nodes      Vasculature: No evidence of aneurysm.    Abdominal wall: No focal hernia or mass.      Bladder: No focal mass or significant wall thickening    Reproductive: Unremarkable as visualized    Bones: No acute bony abnormality.    Impression  1. Nonobstructing stones in both kidneys    2.Nodular density in the left breast recommend correlation with  mammography              This report was finalized on 10/11/2022 4:50 PM by Dr. Dave Will MD.     KUB: Results for orders placed during the hospital encounter of 06/16/22    XR Abdomen KUB    Narrative  EXAM:  XR Abdomen, 1 View    EXAM DATE:  6/16/2022 12:48 PM    CLINICAL HISTORY:  abdominal pain, hx kidney stones    TECHNIQUE:  Frontal supine view of the abdomen/pelvis.    COMPARISON:  02/17/2022    FINDINGS:  Gastrointestinal tract:  Unremarkable.  No dilation.  Organs:  Stones project over the right kidney.  No stones identified  along the expected course of the ureters.  Bones/joints:  Unremarkable.    Impression  1.  Stones project over the right kidney.  2.  No stones identified along the expected course of the ureters.    This report was finalized on 6/16/2022 1:19 PM by Dr. Jeremy Vallejo MD.       Labs:  Brief Urine Lab Results  (Last result in the past 365 days)      Color   Clarity   Blood   Leuk Est   Nitrite   Protein   CREAT   Urine HCG        10/11/22 1543 Yellow   Clear   Moderate (2+)   Small (1+)   Negative   Negative               Admission on 10/11/2022, Discharged on 10/11/2022   Component Date Value Ref Range Status   • Glucose 10/11/2022 72  65 - 99 mg/dL Final   • BUN 10/11/2022 9  6 - 20 mg/dL Final   • Creatinine 10/11/2022 0.57  0.57 - 1.00 mg/dL Final   • Sodium 10/11/2022 140  136 - 145 mmol/L Final   • Potassium 10/11/2022 3.4 (L)  3.5 - 5.2  mmol/L Final    Slight hemolysis detected by analyzer. Results may be affected.   • Chloride 10/11/2022 104  98 - 107 mmol/L Final   • CO2 10/11/2022 22.2  22.0 - 29.0 mmol/L Final   • Calcium 10/11/2022 9.2  8.6 - 10.5 mg/dL Final   • Total Protein 10/11/2022 7.3  6.0 - 8.5 g/dL Final   • Albumin 10/11/2022 4.08  3.50 - 5.20 g/dL Final   • ALT (SGPT) 10/11/2022 10  1 - 33 U/L Final   • AST (SGOT) 10/11/2022 17  1 - 32 U/L Final   • Alkaline Phosphatase 10/11/2022 71  39 - 117 U/L Final   • Total Bilirubin 10/11/2022 0.3  0.0 - 1.2 mg/dL Final   • Globulin 10/11/2022 3.2  gm/dL Final   • A/G Ratio 10/11/2022 1.3  g/dL Final   • BUN/Creatinine Ratio 10/11/2022 15.8  7.0 - 25.0 Final   • Anion Gap 10/11/2022 13.8  5.0 - 15.0 mmol/L Final   • eGFR 10/11/2022 113.7  >60.0 mL/min/1.73 Final    National Kidney Foundation and American Society of Nephrology (ASN) Task Force recommended calculation based on the Chronic Kidney Disease Epidemiology Collaboration (CKD-EPI) equation refit without adjustment for race.   • Color, UA 10/11/2022 Yellow  Yellow, Straw Final   • Appearance, UA 10/11/2022 Clear  Clear Final   • pH, UA 10/11/2022 6.5  5.0 - 8.0 Final   • Specific Gravity, UA 10/11/2022 1.009  1.005 - 1.030 Final   • Glucose, UA 10/11/2022 Negative  Negative Final   • Ketones, UA 10/11/2022 Negative  Negative Final   • Bilirubin, UA 10/11/2022 Negative  Negative Final   • Blood, UA 10/11/2022 Moderate (2+) (A)  Negative Final   • Protein, UA 10/11/2022 Negative  Negative Final   • Leuk Esterase, UA 10/11/2022 Small (1+) (A)  Negative Final   • Nitrite, UA 10/11/2022 Negative  Negative Final   • Urobilinogen, UA 10/11/2022 0.2 E.U./dL  0.2 - 1.0 E.U./dL Final   • HCG Qualitative 10/11/2022 Negative  Negative Final   • WBC 10/11/2022 9.78  3.40 - 10.80 10*3/mm3 Final   • RBC 10/11/2022 4.26  3.77 - 5.28 10*6/mm3 Final   • Hemoglobin 10/11/2022 13.5  12.0 - 15.9 g/dL Final   • Hematocrit 10/11/2022 40.6  34.0 - 46.6 % Final    • MCV 10/11/2022 95.3  79.0 - 97.0 fL Final   • MCH 10/11/2022 31.7  26.6 - 33.0 pg Final   • MCHC 10/11/2022 33.3  31.5 - 35.7 g/dL Final   • RDW 10/11/2022 12.4  12.3 - 15.4 % Final   • RDW-SD 10/11/2022 42.5  37.0 - 54.0 fl Final   • MPV 10/11/2022 9.9  6.0 - 12.0 fL Final   • Platelets 10/11/2022 281  140 - 450 10*3/mm3 Final   • Neutrophil % 10/11/2022 59.9  42.7 - 76.0 % Final   • Lymphocyte % 10/11/2022 32.0  19.6 - 45.3 % Final   • Monocyte % 10/11/2022 5.9  5.0 - 12.0 % Final   • Eosinophil % 10/11/2022 1.6  0.3 - 6.2 % Final   • Basophil % 10/11/2022 0.2  0.0 - 1.5 % Final   • Immature Grans % 10/11/2022 0.4  0.0 - 0.5 % Final   • Neutrophils, Absolute 10/11/2022 5.85  1.70 - 7.00 10*3/mm3 Final   • Lymphocytes, Absolute 10/11/2022 3.13 (H)  0.70 - 3.10 10*3/mm3 Final   • Monocytes, Absolute 10/11/2022 0.58  0.10 - 0.90 10*3/mm3 Final   • Eosinophils, Absolute 10/11/2022 0.16  0.00 - 0.40 10*3/mm3 Final   • Basophils, Absolute 10/11/2022 0.02  0.00 - 0.20 10*3/mm3 Final   • Immature Grans, Absolute 10/11/2022 0.04  0.00 - 0.05 10*3/mm3 Final   • nRBC 10/11/2022 0.0  0.0 - 0.2 /100 WBC Final   • Extra Tube 10/11/2022 Hold for add-ons.   Final    Auto resulted.   • Extra Tube 10/11/2022 hold for add-on   Final    Auto resulted   • Extra Tube 10/11/2022 Hold for add-ons.   Final    Auto resulted   • RBC, UA 10/11/2022 6-12 (A)  None Seen, 0-2 /HPF Final   • WBC, UA 10/11/2022 6-12 (A)  None Seen, 0-2 /HPF Final   • Bacteria, UA 10/11/2022 1+ (A)  None Seen /HPF Final   • Squamous Epithelial Cells, UA 10/11/2022 3-6 (A)  None Seen, 0-2 /HPF Final   • Hyaline Casts, UA 10/11/2022 None Seen  None Seen /LPF Final   • Methodology 10/11/2022 Automated Microscopy   Final   • Urine Culture 10/11/2022 25,000 CFU/mL Mixed Mary Isolated   Final        Procedure: None  Procedures     I have reviewed and agree with the above PMH, PSH, FMH, social history, medications, allergies, and labs.     Assessment/Plan:   Problem  List Items Addressed This Visit        Gastrointestinal Abdominal     Left flank pain    Relevant Medications    ondansetron ODT (ZOFRAN-ODT) 4 MG disintegrating tablet    ketorolac (TORADOL) 10 MG tablet    Left lower quadrant abdominal pain    Overview     Added automatically from request for surgery [id number]         Relevant Medications    ondansetron ODT (ZOFRAN-ODT) 4 MG disintegrating tablet       Genitourinary and Reproductive     History of kidney stones    Relevant Medications    ondansetron ODT (ZOFRAN-ODT) 4 MG disintegrating tablet   Other Visit Diagnoses     Kidney stone    -  Primary    Relevant Medications    ketorolac (TORADOL) 10 MG tablet    Other Relevant Orders    XR Abdomen KUB    Dysuria        Relevant Medications    ondansetron ODT (ZOFRAN-ODT) 4 MG disintegrating tablet          Health Maintenance:   Health Maintenance Due   Topic Date Due   • COLORECTAL CANCER SCREENING  Never done   • ANNUAL PHYSICAL  Never done   • TDAP/TD VACCINES (1 - Tdap) Never done   • HEPATITIS C SCREENING  Never done   • PAP SMEAR  Never done   • COVID-19 Vaccine (3 - Booster for Pfizer series) 10/07/2021   • INFLUENZA VACCINE  Never done        Smoking Counseling: Patient is a former smoker quit in 2018.  She is never used smokeless tobacco.    Urine Incontinence: Patient reports that she is not currently experiencing any symptoms of urinary incontinence.    Patient was given instructions and counseling regarding her condition or for health maintenance advice. Please see specific information pulled into the AVS if appropriate.    Patient Education:   Nephrolithiasis -discussed with the patient the presence of multiple bilateral nonobstructing renal stones with the largest measuring 5 mm in the right upper kidney.  Repeat KUB today shows no evidence of ureteral stones.  Offered a shot of Toradol IM in office however patient states that she would rather have tablets.  I will send in the prescription of Toradol 10 mg  to take as needed every 6 hours for moderate pain.  Advised patient to not take any other NSAIDs with Toradol.  I will send in Zofran 4 mg as needed every 6-8 hours for nausea.  Advised patient to continue taking Flomax once daily.  Advised patient to increase water intake to 2 to 3 L/day.  Informed patient to return to the clinic or go to nearest ER symptoms worsen.  Otherwise, patient wishes to follow-up with me in 1 month for reevaluation.  Patient verbalizes understanding and agrees to plan of care.    Visit Diagnoses:    ICD-10-CM ICD-9-CM   1. Kidney stone  N20.0 592.0   2. Left lower quadrant abdominal pain  R10.32 789.04   3. History of kidney stones  Z87.442 V13.01   4. Left flank pain  R10.9 789.09   5. Dysuria  R30.0 788.1       Meds Ordered During Visit:  New Medications Ordered This Visit   Medications   • ondansetron ODT (ZOFRAN-ODT) 4 MG disintegrating tablet     Sig: Place 1 tablet on the tongue Every 6 (Six) Hours As Needed for Nausea or Vomiting.     Dispense:  30 tablet     Refill:  0   • ketorolac (TORADOL) 10 MG tablet     Sig: Take 1 tablet by mouth Every 6 (Six) Hours As Needed for Moderate Pain.     Dispense:  16 tablet     Refill:  2       Follow Up Appointment: 1 month  No follow-ups on file.      This document has been electronically signed by Randell Cowan PA-C   November 10, 2022 20:05 EST    Part of this note may be an electronic transcription/translation of spoken language to printed text using the Dragon Dictation System.

## 2025-02-26 ENCOUNTER — EVALUATION (OUTPATIENT)
Dept: PHYSICAL THERAPY | Facility: CLINIC | Age: 75
End: 2025-02-26
Payer: MEDICARE

## 2025-02-26 DIAGNOSIS — S32.401A CLOSED DISPLACED FRACTURE OF RIGHT ACETABULUM, UNSPECIFIED PORTION OF ACETABULUM, INITIAL ENCOUNTER (MULTI): ICD-10-CM

## 2025-02-26 DIAGNOSIS — R26.2 DIFFICULTY WALKING: ICD-10-CM

## 2025-02-26 DIAGNOSIS — M25.651 HIP STIFFNESS, RIGHT: Primary | ICD-10-CM

## 2025-02-26 DIAGNOSIS — M25.551 RIGHT HIP PAIN: ICD-10-CM

## 2025-02-26 PROCEDURE — 97110 THERAPEUTIC EXERCISES: CPT | Mod: GP

## 2025-02-26 PROCEDURE — 97161 PT EVAL LOW COMPLEX 20 MIN: CPT | Mod: GP

## 2025-02-26 NOTE — PROGRESS NOTES
Physical Therapy  Physical Therapy Orthopedic Evaluation    Patient Name: Sue Carter  MRN: [id number]  Today's Date: 2/26/2025    Insurance:  Visit [place]: 1 of [place]  Authorization info: No  Insurance Type: Payor: MEDICARE / Plan: MEDICARE PART A AND B / Product Type: *No Product type* /    Current Problem  Problem List Items Addressed This Visit             ICD-10-CM    Closed displaced fracture of right acetabulum, unspecified portion of acetabulum, initial encounter (Multi) S32.401A    Relevant Orders    Follow Up In Physical Therapy    Hip stiffness, right - Primary M25.651    Relevant Orders    Follow Up In Physical Therapy    Right hip pain M25.551    Relevant Orders    Follow Up In Physical Therapy    Difficulty walking R26.2    Relevant Orders    Follow Up In Physical Therapy     General:  General  Reason for Referral: R acetabulum fx (post op)  Referred By: Keyana Garcia PA-C  General Comment: DOS: 12/11/24  Precautions:  Precautions  Precautions Comment: Osteoporosis  Medical History Form: Reviewed (scanned into chart)    Subjective:   MAGNO: Pt reports to evaluation 11 weeks post op of R hip acetabluar fracture surgery. Pt is  PWB 50% RLE using walker. She will maintain the PWB until March 12th and then may advance to WBAT RLE with walker at that time. Pt had home PT and feel like it went well. Pt is currently sleeping in bed and has no trouble getting her leg into her bed. Pt is still having some pain in her hip and will get some calf numbness. Her foot will swell up as well which leads to foot pain.     Previous Med Management: Only surgery    PMH: Nothing regarding her LE     Aggravating Factors: Comes and goes. More at night time     Relieving Factors: Ice and tylenol     Pain/Symptom Scale:  Current: 0/10  Worst: 5/10  Best: 0/10  Location/Nature: Lateral and anterior aspects of the hip and describes as sore and achy   Meds: Tylenol for pain     PLOF: prior to the fall she was having no problems  in her hip   ADL: Independent   Work:  Retired   Athletics: Golf and walking in the park   Recreation/ Hobbies: Golf     Goals: Pt would like to get back to all activities without pain     Language: English      Red Flags: Do you have any of the following? No  Fever/chills, unexplained weight changes, dizziness/fainting, unexplained change in bowel or bladder functions, unexplained malaise or muscle weakness, night pain/sweats, numbness or tingling    Objective   Palpation/Observation   No tenderness to palpation of surrounding R hip musculature.     Hip ROM   Flexion- R: 90 L: 110  Abduction- R: 20 L: 35  ER- R:20 L: 30  IR- R: 15 L: 20    Hip MMT  Flexion- R: 4/5  L: 4+/5  Abduction- R: 4/5 L: 4+/5  Adduction - R: 4/5 L: 4+/5  Extension- R: 4/5 L: 4+/5    Outcome Measures:  Other Measures  Lower Extremity Funtional Score (LEFS): 40/80     Treatments:  Access Code: [email or web address]  URL: [email or web address]  Date: 02/26/2025  Prepared by: Kg Varela    Exercises  - Supine Heel Slide  - 1 x daily - 7 x weekly - 3 sets - 10 reps  - Bent Knee Fallouts  - 1 x daily - 7 x weekly - 3 sets - 10 reps  - Hooklying Isometric Clamshell  - 1 x daily - 7 x weekly - 3 sets - 10 reps  - Supine Hip Adduction Isometric with Ball  - 1 x daily - 7 x weekly - 3 sets - 10 reps  - Sit to Stand with Armchair  - 1 x daily - 7 x weekly - 3 sets - 10 reps    EDUCATION: Home exercise program, plan of care, activity modifications, pain management, and injury pathology       Assessment:     Patient is a 74 year old female that presents to physical therapy evaluation today 11 weeks post op of R hip acetabulum repair . Patient impairments include flexibility deficits of R hip, strength deficits in R hip musculature, and motor control deficits including lack of hip control. Due to these impairments, the patients has the following functional limitations: pain with being on her feet for too long, difficulty walking without  assistive device, and an overall decrease in functional mobility. Skilled therapy recommended in order to to address their impairments and progress towards the associated functional goals. Prognosis is excellent secondary to their current presentation and client understanding. The pt demonstrates a good understanding of their current plan of care, rehab expectations, and prognosis.          Plan:     Planned Interventions include: therapeutic exercise, self-care home management, manual therapy, therapeutic activities, gait training, neuromuscular coordination, vasopneumatic, dry needling, aquatic therapy  Frequency: 1-2 x Week  Duration: 12 Weeks  Goals: Set and discussed today  Active       PT Problem       PT Goals       Start:  03/03/25       1. Pt will increase LEFS with 65/80 or better in order to demo an increase in R hip function  2. Pt will increase R hip ROM to 0-110 in order to demo an increase in knee functional mobility   3. Pt will demo 4+/5 or all hip/knee MMT in order to demo an increase in LE strength   4. Pt will be able to ascend and descend 10 steps with reciprocal gait pattern and proper knee control in order to demo and increase in functional mobility   5. Pt will demo compliance and independence with HEP   6.  Pt will be able to walk for 10 mins with step through gait pattern and non antalgic gait pattern with no AD               Plan of care was developed with input and agreement by the patient  Ambulatory Screenings Summary       Screening  Frequency  Date Last Completed   Spiritual and Cultural Beliefs   Screening  each visit or episode of care 12/11/2024   Falls Risk Screening  every ambulatory visit    Pain Screening  annually at primary care visit  10/29/2024   Domestic Violence screening  annually at primary care visit 5/8/2024   Elder Abuse Screening  annually at primary care visit 5/8/2024   Depression Screening  annually in the primary care setting 12/11/2024   Suicide Risk Screening   annually in the primary care setting 1/2/2025   Nutrition and Food Insecurity   Screening  at least annually at primary care visit  12/12/2024   Key Learner  annually in the primary care setting 12/11/2024   Drug Screen  2/18/2025 11:27 AM   Alcohol Screen  2/18/2025 11:27 AM   Advance Directive  2/12/2025       Time Calculation  Start Time: 1015  Stop Time: 1100  Time Calculation (min): 45 min  PT Evaluation Time Entry  PT Evaluation (Low) Time Entry: 25 PT Therapeutic Procedures Time Entry  Therapeutic Exercise Time Entry: 15

## 2025-03-03 PROBLEM — R26.2 DIFFICULTY WALKING: Status: ACTIVE | Noted: 2025-03-03

## 2025-03-03 PROBLEM — M25.651 HIP STIFFNESS, RIGHT: Status: ACTIVE | Noted: 2025-03-03

## 2025-03-03 PROBLEM — M25.551 RIGHT HIP PAIN: Status: ACTIVE | Noted: 2025-03-03

## 2025-03-05 ENCOUNTER — TREATMENT (OUTPATIENT)
Dept: PHYSICAL THERAPY | Facility: CLINIC | Age: 75
End: 2025-03-05
Payer: MEDICARE

## 2025-03-05 DIAGNOSIS — M25.651 HIP STIFFNESS, RIGHT: ICD-10-CM

## 2025-03-05 DIAGNOSIS — S32.401A CLOSED DISPLACED FRACTURE OF RIGHT ACETABULUM, UNSPECIFIED PORTION OF ACETABULUM, INITIAL ENCOUNTER (MULTI): ICD-10-CM

## 2025-03-05 DIAGNOSIS — R26.2 DIFFICULTY WALKING: ICD-10-CM

## 2025-03-05 DIAGNOSIS — M25.551 RIGHT HIP PAIN: ICD-10-CM

## 2025-03-05 PROCEDURE — 97110 THERAPEUTIC EXERCISES: CPT | Mod: GP

## 2025-03-05 NOTE — PROGRESS NOTES
Physical Therapy  Physical Therapy Treatment Note    Patient Name: Sue Carter  MRN: [id number]  Today's Date: 3/5/2025  Time Calculation  Start Time: 0845  Stop Time: 0930  Time Calculation (min): 45 min  PT Therapeutic Procedures Time Entry  Therapeutic Exercise Time Entry: 40        Insurance:  Visit number: 2 of [place]  Authorization info: No auth   Insurance Type: Payor: MEDICARE / Plan: MEDICARE PART A AND B / Product Type: *No Product type* /   Current Problem  1. Closed displaced fracture of right acetabulum, unspecified portion of acetabulum, initial encounter (Multi)  Follow Up In Physical Therapy      2. Hip stiffness, right  Follow Up In Physical Therapy      3. Difficulty walking  Follow Up In Physical Therapy      4. Right hip pain  Follow Up In Physical Therapy        General  Reason for Referral: R acetabulum fx (post op)  Referred By: Keyana Garcia PA-C  General Comment: DOS: 12/11/24  Precautions  Precautions  Precautions Comment: Osteoporosis  Subjective:     Patient reports that she has continued to have on and off pain in her hip. Currently she is at around a 5/10. She is still ambulating with her walker   Pain     Objective:   Palpation/Observation   No tenderness to palpation of surrounding R hip musculature.      Hip ROM   Flexion- R: 90 L: 110  Abduction- R: 20 L: 35  ER- R:20 L: 30  IR- R: 15 L: 20     Hip MMT  Flexion- R: 4/5  L: 4+/5  Abduction- R: 4/5 L: 4+/5  Adduction - R: 4/5 L: 4+/5  Extension- R: 4/5 L: 4+/5    6 min walk test   478 ft with walker and no rest breaks      Outcome Measures:  Other Measures  Lower Extremity Funtional Score (LEFS): 40/80  Treatments:     THERE EX  PROM of R hip   Supine BKFO 1 x 15   Supine isometric clamshell 3 x 10 (red)  Supine adduction 3 x 10   6 min walk test       MANUAL        Assessment:  Pt tolerated session well. Pt was able to continue to perform pain free exercise today. Pt was able to perform 6 min walk test today with walker and no rest  breaks. Pt continues to progress towards goals established in the POC and should continue with skilled therapy.       Plan: Continue to progress R hip ROM and strengthening. Begin to wean from walker after March 12      Goals:  Active       PT Problem       PT Goals       Start:  03/03/25       1. Pt will increase LEFS with 65/80 or better in order to demo an increase in R hip function  2. Pt will increase R hip ROM to 0-110 in order to demo an increase in knee functional mobility   3. Pt will demo 4+/5 or all hip/knee MMT in order to demo an increase in LE strength   4. Pt will be able to ascend and descend 10 steps with reciprocal gait pattern and proper knee control in order to demo and increase in functional mobility   5. Pt will demo compliance and independence with HEP   6.  Pt will be able to walk for 10 mins with step through gait pattern and non antalgic gait pattern with no AD

## 2025-03-06 ENCOUNTER — APPOINTMENT (OUTPATIENT)
Dept: ORTHOPEDIC SURGERY | Facility: CLINIC | Age: 75
End: 2025-03-06
Payer: MEDICARE

## 2025-03-06 ENCOUNTER — TREATMENT (OUTPATIENT)
Dept: PHYSICAL THERAPY | Facility: CLINIC | Age: 75
End: 2025-03-06
Payer: MEDICARE

## 2025-03-06 DIAGNOSIS — S32.401A CLOSED DISPLACED FRACTURE OF RIGHT ACETABULUM, UNSPECIFIED PORTION OF ACETABULUM, INITIAL ENCOUNTER (MULTI): ICD-10-CM

## 2025-03-06 DIAGNOSIS — M25.551 RIGHT HIP PAIN: ICD-10-CM

## 2025-03-06 DIAGNOSIS — M25.651 HIP STIFFNESS, RIGHT: ICD-10-CM

## 2025-03-06 DIAGNOSIS — R26.2 DIFFICULTY WALKING: ICD-10-CM

## 2025-03-06 PROCEDURE — 97110 THERAPEUTIC EXERCISES: CPT | Mod: GP

## 2025-03-06 NOTE — PROGRESS NOTES
"Physical Therapy  Physical Therapy Treatment Note    Patient Name: Sue Carter  MRN: [id number]  Today's Date: 3/6/2025  Time Calculation  Start Time: 0915  Stop Time: 1000  Time Calculation (min): 45 min  PT Therapeutic Procedures Time Entry  Therapeutic Exercise Time Entry: 40        Insurance:  Visit number: 3 of MN  Authorization info: No auth   Insurance Type: Payor: MEDICARE / Plan: MEDICARE PART A AND B / Product Type: *No Product type* /   Current Problem  1. Closed displaced fracture of right acetabulum, unspecified portion of acetabulum, initial encounter (Multi)  Follow Up In Physical Therapy      2. Hip stiffness, right  Follow Up In Physical Therapy      3. Difficulty walking  Follow Up In Physical Therapy      4. Right hip pain  Follow Up In Physical Therapy        General  Reason for Referral: R acetabulum fx (post op)  Referred By: Keyana Garcia PA-C  General Comment: DOS: 12/11/24  Precautions  Precautions  Precautions Comment: Osteoporosis  Subjective:     Patient reports that she did not have much soreness after yesterday's session. She was able to walk around [facility] but is still getting pain up to a 5/10 at times.   Pain     Objective:   Palpation/Observation   No tenderness to palpation of surrounding R hip musculature.      Hip ROM   Flexion- R: 90 L: 110  Abduction- R: 20 L: 35  ER- R:20 L: 30  IR- R: 15 L: 20     Hip MMT  Flexion- R: 4/5  L: 4+/5  Abduction- R: 4/5 L: 4+/5  Adduction - R: 4/5 L: 4+/5  Extension- R: 4/5 L: 4+/5    6 min walk test   478 ft with walker and no rest breaks      Outcome Measures:  Other Measures  Lower Extremity Funtional Score (LEFS): 40/80  Treatments:     THERE EX  5 min bike for ROM   BL shuttle press 3 x 10   4\" step up 2 x 10   R hip march in // bars 3 x 10   Supine banded clamshell 3 x 10 (red)   Supine adduction 3 x 10         MANUAL        Assessment:  Pt tolerated session well. Pt was able to continue to progress LE strengthening today with a " minimal increase in discomfort. Pt will continue to use walker until March 12 but was able to put more than 50% WB through her R LE today. Pt continues to progress towards goals established in the POC and should continue with skilled therapy.       Plan: Continue to progress R hip ROM and strengthening. Begin to wean from walker after March 12      Goals:  Active       PT Problem       PT Goals       Start:  03/03/25       1. Pt will increase LEFS with 65/80 or better in order to demo an increase in R hip function  2. Pt will increase R hip ROM to 0-110 in order to demo an increase in knee functional mobility   3. Pt will demo 4+/5 or all hip/knee MMT in order to demo an increase in LE strength   4. Pt will be able to ascend and descend 10 steps with reciprocal gait pattern and proper knee control in order to demo and increase in functional mobility   5. Pt will demo compliance and independence with HEP   6.  Pt will be able to walk for 10 mins with step through gait pattern and non antalgic gait pattern with no AD

## 2025-03-10 ENCOUNTER — HOSPITAL ENCOUNTER (OUTPATIENT)
Dept: RADIOLOGY | Facility: CLINIC | Age: 75
Discharge: HOME | End: 2025-03-10
Payer: MEDICARE

## 2025-03-10 DIAGNOSIS — M81.0 AGE-RELATED OSTEOPOROSIS WITHOUT CURRENT PATHOLOGICAL FRACTURE: ICD-10-CM

## 2025-03-10 PROCEDURE — 77080 DXA BONE DENSITY AXIAL: CPT

## 2025-03-10 PROCEDURE — 77080 DXA BONE DENSITY AXIAL: CPT | Performed by: RADIOLOGY

## 2025-03-11 ENCOUNTER — TREATMENT (OUTPATIENT)
Dept: PHYSICAL THERAPY | Facility: CLINIC | Age: 75
End: 2025-03-11
Payer: MEDICARE

## 2025-03-11 DIAGNOSIS — S32.401A CLOSED DISPLACED FRACTURE OF RIGHT ACETABULUM, UNSPECIFIED PORTION OF ACETABULUM, INITIAL ENCOUNTER (MULTI): ICD-10-CM

## 2025-03-11 DIAGNOSIS — M25.651 HIP STIFFNESS, RIGHT: ICD-10-CM

## 2025-03-11 DIAGNOSIS — M25.551 RIGHT HIP PAIN: ICD-10-CM

## 2025-03-11 DIAGNOSIS — R26.2 DIFFICULTY WALKING: ICD-10-CM

## 2025-03-11 DIAGNOSIS — M81.0 OSTEOPOROSIS, UNSPECIFIED OSTEOPOROSIS TYPE, UNSPECIFIED PATHOLOGICAL FRACTURE PRESENCE: Primary | ICD-10-CM

## 2025-03-11 PROCEDURE — 97110 THERAPEUTIC EXERCISES: CPT | Mod: GP

## 2025-03-11 NOTE — PROGRESS NOTES
Physical Therapy  Physical Therapy Treatment Note    Patient Name: Sue Carter  MRN: [id number]  Today's Date: 3/11/2025  Time Calculation  Start Time: 0930  Stop Time: 1015  Time Calculation (min): 45 min  PT Therapeutic Procedures Time Entry  Therapeutic Exercise Time Entry: 40        Insurance:  Visit number: 3 of [place]  Authorization info: No auth   Insurance Type: Payor: MEDICARE / Plan: MEDICARE PART A AND B / Product Type: *No Product type* /   Current Problem  1. Closed displaced fracture of right acetabulum, unspecified portion of acetabulum, initial encounter (Multi)  Follow Up In Physical Therapy      2. Hip stiffness, right  Follow Up In Physical Therapy      3. Difficulty walking  Follow Up In Physical Therapy      4. Right hip pain  Follow Up In Physical Therapy        General   General  Reason for Referral: R acetabulum fx (post op)  Referred By: Keyana Garcia PA-C  General Comment: DOS: 12/11/24  Precautions:  Precautions  Precautions Comment: Osteoporosis  Precautions    50% WB through right hip, until March 12th  After March 12th able to progress WBAT  12 weeks + no more ROM restrictions for flexion   Subjective:     Patient reports that she did not have much soreness after yesterday's session. She was able to walk around [employer] but is still getting pain up to a 5/10 at times.   Pain     Objective:   Palpation/Observation   No tenderness to palpation of surrounding R hip musculature.      Hip ROM   Flexion- R: 90 L: 110  Abduction- R: 20 L: 35  ER- R:20 L: 30  IR- R: 15 L: 20     Hip MMT  Flexion- R: 4/5  L: 4+/5  Abduction- R: 4/5 L: 4+/5  Adduction - R: 4/5 L: 4+/5  Extension- R: 4/5 L: 4+/5    6 min walk test   478 ft with walker and no rest breaks      Outcome Measures:  Other Measures  Lower Extremity Funtional Score (LEFS): 40/80  Treatments:     THERE EX  5.5 min bike for ROM   Review of symptoms in the foot/ankle and right hip symptoms  LAQ x 2.5lbs x 12 reps right and left  Right knee  "Bent knee fall out knee bent, over the small ball, leg straight  Passive knee flexion/hip flexion  A/AROM with strap knee flexion heel slide  Double knee to chest A/AROM x 20 reps  Glute set/quad set review  Gastroc stretch x 2 min right side  Review of PT POC and guidelines     BL shuttle press 3 x 10   4\" step up 2 x 10   R hip march in // bars 3 x 10   Supine banded clamshell 3 x 10 (red)   Supine adduction 3 x 10         MANUAL        Assessment:  Pt tolerated session well. She has some tightness with passive and active hip flexion ROM that limits her from functional activity like putting on her socks and shoes. Advised pt to try compression socks to help with foot/ankle discomfort. Pt continues to progress towards goals established in the POC and should continue with skilled therapy.       Plan: Continue to progress R hip ROM and strengthening. Begin to wean from walker after March 12      Goals:  Active       PT Problem       PT Goals       Start:  03/03/25       1. Pt will increase LEFS with 65/80 or better in order to demo an increase in R hip function  2. Pt will increase R hip ROM to 0-110 in order to demo an increase in knee functional mobility   3. Pt will demo 4+/5 or all hip/knee MMT in order to demo an increase in LE strength   4. Pt will be able to ascend and descend 10 steps with reciprocal gait pattern and proper knee control in order to demo and increase in functional mobility   5. Pt will demo compliance and independence with HEP   6.  Pt will be able to walk for 10 mins with step through gait pattern and non antalgic gait pattern with no AD              "

## 2025-03-13 ENCOUNTER — TREATMENT (OUTPATIENT)
Dept: PHYSICAL THERAPY | Facility: CLINIC | Age: 75
End: 2025-03-13
Payer: MEDICARE

## 2025-03-13 DIAGNOSIS — S32.401A CLOSED DISPLACED FRACTURE OF RIGHT ACETABULUM, UNSPECIFIED PORTION OF ACETABULUM, INITIAL ENCOUNTER (MULTI): ICD-10-CM

## 2025-03-13 DIAGNOSIS — R26.2 DIFFICULTY WALKING: ICD-10-CM

## 2025-03-13 DIAGNOSIS — M25.551 RIGHT HIP PAIN: ICD-10-CM

## 2025-03-13 DIAGNOSIS — M25.651 HIP STIFFNESS, RIGHT: ICD-10-CM

## 2025-03-13 PROCEDURE — 97110 THERAPEUTIC EXERCISES: CPT | Mod: GP

## 2025-03-13 PROCEDURE — 97112 NEUROMUSCULAR REEDUCATION: CPT | Mod: GP

## 2025-03-13 NOTE — PROGRESS NOTES
Physical Therapy  Physical Therapy Treatment Note    Patient Name: Sue Carter  MRN: [id number]  Today's Date: 3/13/2025  Time Calculation  Start Time: 1145  Stop Time: 1230  Time Calculation (min): 45 min  PT Therapeutic Procedures Time Entry  Neuromuscular Re-Education Time Entry: 20  Therapeutic Exercise Time Entry: 25        Insurance:  Visit number: 4 of [place]  Authorization info: No auth   Insurance Type: Payor: MEDICARE / Plan: MEDICARE PART A AND B / Product Type: *No Product type* /   Current Problem  1. Closed displaced fracture of right acetabulum, unspecified portion of acetabulum, initial encounter (Multi)  Follow Up In Physical Therapy      2. Hip stiffness, right  Follow Up In Physical Therapy      3. Difficulty walking  Follow Up In Physical Therapy      4. Right hip pain  Follow Up In Physical Therapy        General   General  Reason for Referral: R acetabulum fx (post op)  Referred By: Keyana Garcia PA-C  General Comment: DOS: 12/11/24  Precautions:  Precautions  Precautions Comment: Osteoporosis  Precautions    50% WB through right hip, until March 12th  After March 12th able to progress WBAT  12 weeks + no more ROM restrictions for flexion   Subjective:     Pt reports that she feels about the same.  No new pain. Today we are able to put full WBAT through the right LE   Pain     Objective:   Palpation/Observation   No tenderness to palpation of surrounding R hip musculature.      Hip ROM   Flexion- R: 90 L: 110  Abduction- R: 20 L: 35  ER- R:20 L: 30  IR- R: 15 L: 20     Hip MMT  Flexion- R: 4/5  L: 4+/5  Abduction- R: 4/5 L: 4+/5  Adduction - R: 4/5 L: 4+/5  Extension- R: 4/5 L: 4+/5    6 min walk test   478 ft with walker and no rest breaks      Outcome Measures:  Other Measures  Lower Extremity Funtional Score (LEFS): 40/80  Treatments:     THERE EX  -5.5 min bike for ROM   -Standing weight shift at mat x 2 min   -Forward and back weight shifts at table top, side to side weights shifts at  counter top, modified tandem weight shifts in walker front to back with each foot forward  -LAQ x 2.5lbs x 12 reps right and left  - seated hamstring curl with right leg yellow band x 15 reps   - forward and backward walking in parallel bars multiple times  - side to side stepping in parallel bars right to left multiple times  - standing heel raises x 15 reps  - standing marches in parallel bars on the right and left side  - Standing small range hip abduction/extension/flexion  ( stance side left LE only ) x 12 reps each direction   Review of PT POC and guidelines           MANUAL        Assessment:  Pt tolerated session well. She was able to decrease the UE support in the parallel bars to one hand only on the left side.  Some verbal cues are needed to improve equal weight shift over the right and let LE.  Pt continues to progress towards goals established in the POC and should continue with skilled therapy.       Plan: Continue to progress R hip ROM and strengthening. Begin to wean from walker after March 12      Goals:  Active       PT Problem       PT Goals       Start:  03/03/25       1. Pt will increase LEFS with 65/80 or better in order to demo an increase in R hip function  2. Pt will increase R hip ROM to 0-110 in order to demo an increase in knee functional mobility   3. Pt will demo 4+/5 or all hip/knee MMT in order to demo an increase in LE strength   4. Pt will be able to ascend and descend 10 steps with reciprocal gait pattern and proper knee control in order to demo and increase in functional mobility   5. Pt will demo compliance and independence with HEP   6.  Pt will be able to walk for 10 mins with step through gait pattern and non antalgic gait pattern with no AD

## 2025-03-18 ENCOUNTER — TREATMENT (OUTPATIENT)
Dept: PHYSICAL THERAPY | Facility: CLINIC | Age: 75
End: 2025-03-18
Payer: MEDICARE

## 2025-03-18 DIAGNOSIS — M25.551 RIGHT HIP PAIN: ICD-10-CM

## 2025-03-18 DIAGNOSIS — R26.2 DIFFICULTY WALKING: ICD-10-CM

## 2025-03-18 DIAGNOSIS — S32.401A CLOSED DISPLACED FRACTURE OF RIGHT ACETABULUM, UNSPECIFIED PORTION OF ACETABULUM, INITIAL ENCOUNTER (MULTI): ICD-10-CM

## 2025-03-18 DIAGNOSIS — M25.651 HIP STIFFNESS, RIGHT: ICD-10-CM

## 2025-03-18 PROCEDURE — 97110 THERAPEUTIC EXERCISES: CPT | Mod: GP

## 2025-03-18 NOTE — PROGRESS NOTES
Physical Therapy  Physical Therapy Treatment Note    Patient Name: Sue Carter  MRN: [id number]  Today's Date: 3/18/2025              Insurance:  Visit number: 5 of [place]  Authorization info: No auth   Insurance Type: Payor: MEDICARE / Plan: MEDICARE PART A AND B / Product Type: *No Product type* /   Current Problem  1. Closed displaced fracture of right acetabulum, unspecified portion of acetabulum, initial encounter (Multi)  Follow Up In Physical Therapy      2. Hip stiffness, right  Follow Up In Physical Therapy      3. Difficulty walking  Follow Up In Physical Therapy      4. Right hip pain  Follow Up In Physical Therapy        General   General  Reason for Referral: R acetabulum fx (post op)  Referred By: Keyana Garcia PA-C  General Comment: DOS: 12/11/24  Precautions:  Precautions  Precautions Comment: Osteoporosis  Precautions    50% WB through right hip, until March 12th  After March 12th able to progress WBAT  12 weeks + no more ROM restrictions for flexion   Subjective:     Pt reports that overall she has around the same pain but putting more weight and near 100% WB through the right leg at home with the walker  Pain     Objective:   Palpation/Observation   No tenderness to palpation of surrounding R hip musculature.      Hip ROM   Flexion- R: 90 L: 110  Abduction- R: 20 L: 35  ER- R:20 L: 30  IR- R: 15 L: 20     Hip MMT  Flexion- R: 4/5  L: 4+/5  Abduction- R: 4/5 L: 4+/5  Adduction - R: 4/5 L: 4+/5  Extension- R: 4/5 L: 4+/5    6 min walk test   478 ft with walker and no rest breaks      Outcome Measures:  Other Measures  Lower Extremity Funtional Score (LEFS): 40/80  Treatments:     THERE EX  -5 min bike for ROM  - gastroc stretch x 2 min   - step up with right 3 in x 12 reps   -LAQ x 2.5lbs x 12 reps right and left  - seated hamstring curl with right leg yellow band x 15 reps   - forward and backward walking in parallel bars multiple times  - side to side stepping in parallel bars right to left  multiple times  - hip abd with GTB x  15 rep  -hip add x 6 secs x 15 reps   - LAQ x 3 lbs x 15 reps   - seated hip marches x 15 reps   - standing hip marches x 15 reps  - ball roll forward x 15 reps   - review of mechanics for putting on socks/shoes  Review of PT POC and guidelines           MANUAL        Assessment:  Pt still has soreness in the posterior buttocks and groin region.  She demonstrates improvement in functional strength in term of being able to perform right step up and gait in the parallel bars.   Pt continues to progress towards goals established in the POC and should continue with skilled therapy.       Plan: Continue to progress R hip ROM and strengthening. Begin to wean from walker after March 12      Goals:  Active       PT Problem       PT Goals       Start:  03/03/25       1. Pt will increase LEFS with 65/80 or better in order to demo an increase in R hip function  2. Pt will increase R hip ROM to 0-110 in order to demo an increase in knee functional mobility   3. Pt will demo 4+/5 or all hip/knee MMT in order to demo an increase in LE strength   4. Pt will be able to ascend and descend 10 steps with reciprocal gait pattern and proper knee control in order to demo and increase in functional mobility   5. Pt will demo compliance and independence with HEP   6.  Pt will be able to walk for 10 mins with step through gait pattern and non antalgic gait pattern with no AD

## 2025-03-20 ENCOUNTER — APPOINTMENT (OUTPATIENT)
Dept: PHYSICAL THERAPY | Facility: CLINIC | Age: 75
End: 2025-03-20
Payer: MEDICARE

## 2025-03-25 ENCOUNTER — APPOINTMENT (OUTPATIENT)
Dept: PHYSICAL THERAPY | Facility: CLINIC | Age: 75
End: 2025-03-25
Payer: MEDICARE

## 2025-03-27 ENCOUNTER — TREATMENT (OUTPATIENT)
Dept: PHYSICAL THERAPY | Facility: CLINIC | Age: 75
End: 2025-03-27
Payer: MEDICARE

## 2025-03-27 DIAGNOSIS — S32.401A CLOSED DISPLACED FRACTURE OF RIGHT ACETABULUM, UNSPECIFIED PORTION OF ACETABULUM, INITIAL ENCOUNTER (MULTI): ICD-10-CM

## 2025-03-27 DIAGNOSIS — M25.551 RIGHT HIP PAIN: ICD-10-CM

## 2025-03-27 DIAGNOSIS — R26.2 DIFFICULTY WALKING: ICD-10-CM

## 2025-03-27 DIAGNOSIS — M25.651 HIP STIFFNESS, RIGHT: ICD-10-CM

## 2025-03-27 PROCEDURE — 97110 THERAPEUTIC EXERCISES: CPT | Mod: GP

## 2025-03-27 NOTE — PROGRESS NOTES
Physical Therapy  Physical Therapy Treatment Note    Patient Name: Sue Carter  MRN: [id number]  Today's Date: 3/27/2025  Time Calculation  Start Time: 0800  Stop Time: 0845  Time Calculation (min): 45 min  PT Therapeutic Procedures Time Entry  Therapeutic Exercise Time Entry: 40        Insurance:  Visit number: 6 of [place]  Authorization info: No auth   Insurance Type: Payor: MEDICARE / Plan: MEDICARE PART A AND B / Product Type: *No Product type* /   Current Problem  1. Closed displaced fracture of right acetabulum, unspecified portion of acetabulum, initial encounter (Multi)  Follow Up In Physical Therapy      2. Hip stiffness, right  Follow Up In Physical Therapy      3. Difficulty walking  Follow Up In Physical Therapy      4. Right hip pain  Follow Up In Physical Therapy        General   General  Reason for Referral: R acetabulum fx (post op)  Referred By: Keyana Garcia PA-C  General Comment: DOS: 12/11/24  Precautions:  Precautions  Precautions Comment: Osteoporosis  Precautions    50% WB through right hip, until March 12th  After March 12th able to progress WBAT  12 weeks + no more ROM restrictions for flexion   Subjective:     Pt reports that she continues to have some pain each day but its never constant.   Pain     Objective:   Palpation/Observation   No tenderness to palpation of surrounding R hip musculature.      Hip ROM   Flexion- R: 90 L: 110  Abduction- R: 20 L: 35  ER- R:20 L: 30  IR- R: 15 L: 20     Hip MMT  Flexion- R: 4/5  L: 4+/5  Abduction- R: 4/5 L: 4+/5  Adduction - R: 4/5 L: 4+/5  Extension- R: 4/5 L: 4+/5    6 min walk test   478 ft with walker and no rest breaks      Outcome Measures:  Other Measures  Lower Extremity Funtional Score (LEFS): 40/80  Treatments:     THERE EX  -5 min bike for ROM  -Ambulation training with cane in L hand working on cane and R LE staying in sync  - Cane walking 3 x 60'   -BL shuttle press (15,31,37#) 3 x 15   - Banded side steps 3 x 3 down/back in // bars  (yellow)       MANUAL        Assessment:  Pt tolerated session well. Pt was able to begin to wean from her walker. She did well with getting in sync with the cane in her L arm and R leg. Pt continues to progress towards goals established in the POC and should continue with skilled therapy.         Plan: Continue to progress R hip ROM and strengthening. Begin to wean from walker after March 12      Goals:  Active       PT Problem       PT Goals       Start:  03/03/25       1. Pt will increase LEFS with 65/80 or better in order to demo an increase in R hip function  2. Pt will increase R hip ROM to 0-110 in order to demo an increase in knee functional mobility   3. Pt will demo 4+/5 or all hip/knee MMT in order to demo an increase in LE strength   4. Pt will be able to ascend and descend 10 steps with reciprocal gait pattern and proper knee control in order to demo and increase in functional mobility   5. Pt will demo compliance and independence with HEP   6.  Pt will be able to walk for 10 mins with step through gait pattern and non antalgic gait pattern with no AD

## 2025-04-01 ENCOUNTER — TREATMENT (OUTPATIENT)
Dept: PHYSICAL THERAPY | Facility: CLINIC | Age: 75
End: 2025-04-01
Payer: MEDICARE

## 2025-04-01 DIAGNOSIS — R26.2 DIFFICULTY WALKING: ICD-10-CM

## 2025-04-01 DIAGNOSIS — M25.551 RIGHT HIP PAIN: ICD-10-CM

## 2025-04-01 DIAGNOSIS — M25.651 HIP STIFFNESS, RIGHT: ICD-10-CM

## 2025-04-01 DIAGNOSIS — S32.401A CLOSED DISPLACED FRACTURE OF RIGHT ACETABULUM, UNSPECIFIED PORTION OF ACETABULUM, INITIAL ENCOUNTER (MULTI): ICD-10-CM

## 2025-04-01 PROCEDURE — 97110 THERAPEUTIC EXERCISES: CPT | Mod: GP

## 2025-04-01 NOTE — PROGRESS NOTES
Physical Therapy  Physical Therapy Treatment Note    Patient Name: Sue Carter  MRN: [id number]  Today's Date: 4/1/2025  Time Calculation  Start Time: 0930  Stop Time: 1015  Time Calculation (min): 45 min  PT Therapeutic Procedures Time Entry  Therapeutic Exercise Time Entry: 41        Insurance:  Visit number: 7 of [place]  Authorization info: No auth   Insurance Type: Payor: MEDICARE / Plan: MEDICARE PART A AND B / Product Type: *No Product type* /   Current Problem  1. Closed displaced fracture of right acetabulum, unspecified portion of acetabulum, initial encounter (Multi)  Follow Up In Physical Therapy      2. Hip stiffness, right  Follow Up In Physical Therapy      3. Difficulty walking  Follow Up In Physical Therapy      4. Right hip pain  Follow Up In Physical Therapy        General  Reason for Referral: R acetabulum fx (post op)  Referred By: Keyana Garcia PA-C  General Comment: DOS: 12/11/24General  Reason for Referral: R acetabulum fx (post op)  Referred By: Keyana Garcia PA-C  General Comment: DOS: 12/11/24  Precautions:  Precautions  Precautions Comment: Osteoporosis  Precautions  Precautions  Precautions Comment: Osteoporosis 50% WB through right hip, until March 12th  After March 12th able to progress WBAT  12 weeks + no more ROM restrictions for flexion   Subjective:     Pt has been having some increased pain in her hip and isn't sure if it is due to weather or because she has been putting more weight through her hip walking with a cane.   Pain     Objective:   Palpation/Observation   No tenderness to palpation of surrounding R hip musculature.      Hip ROM   Flexion- R: 90 L: 110  Abduction- R: 20 L: 35  ER- R:20 L: 30  IR- R: 15 L: 20     Hip MMT  Flexion- R: 4/5  L: 4+/5  Abduction- R: 4/5 L: 4+/5  Adduction - R: 4/5 L: 4+/5  Extension- R: 4/5 L: 4+/5    6 min walk test   478 ft with walker and no rest breaks      Outcome Measures:  Other Measures  Lower Extremity Funtional Score  "(LEFS): 40/80  Treatments:     THERE EX  -5 min bike for ROM  - Supine isometric hip abduction 5 x 30 sec on/off   -Supine adduction 3 x 10   - Supine adduction bridge 2 x 6   - 6\" step up 2 x 10  - Banded chops in golf stance 2 x 10 each way       MANUAL        Assessment:  Pt tolerated session well. Pt was started on isometric hip abduction due to lateral hip soreness over her greater trochanter. She did not have any increase in pain when doing this. Pt was able to begin banded chops in golf stance for balance and core stability. Pt continues to progress towards goals established in the POC and should continue with skilled therapy.           Plan: Continue to progress R hip ROM and strengthening. Begin to wean from walker after March 12      Goals:  Active       PT Problem       PT Goals       Start:  03/03/25       1. Pt will increase LEFS with 65/80 or better in order to demo an increase in R hip function  2. Pt will increase R hip ROM to 0-110 in order to demo an increase in knee functional mobility   3. Pt will demo 4+/5 or all hip/knee MMT in order to demo an increase in LE strength   4. Pt will be able to ascend and descend 10 steps with reciprocal gait pattern and proper knee control in order to demo and increase in functional mobility   5. Pt will demo compliance and independence with HEP   6.  Pt will be able to walk for 10 mins with step through gait pattern and non antalgic gait pattern with no AD              "

## 2025-04-03 ENCOUNTER — HOSPITAL ENCOUNTER (OUTPATIENT)
Dept: RADIOLOGY | Facility: CLINIC | Age: 75
Discharge: HOME | End: 2025-04-03
Payer: MEDICARE

## 2025-04-03 ENCOUNTER — OFFICE VISIT (OUTPATIENT)
Dept: ORTHOPEDIC SURGERY | Facility: CLINIC | Age: 75
End: 2025-04-03
Payer: MEDICARE

## 2025-04-03 DIAGNOSIS — M70.61 GREATER TROCHANTERIC BURSITIS OF RIGHT HIP: Primary | ICD-10-CM

## 2025-04-03 DIAGNOSIS — S32.401A CLOSED DISPLACED FRACTURE OF RIGHT ACETABULUM, UNSPECIFIED PORTION OF ACETABULUM, INITIAL ENCOUNTER (MULTI): ICD-10-CM

## 2025-04-03 PROCEDURE — 1159F MED LIST DOCD IN RCRD: CPT | Performed by: PHYSICIAN ASSISTANT

## 2025-04-03 PROCEDURE — 99214 OFFICE O/P EST MOD 30 MIN: CPT | Mod: 25 | Performed by: PHYSICIAN ASSISTANT

## 2025-04-03 PROCEDURE — 20610 DRAIN/INJ JOINT/BURSA W/O US: CPT | Mod: RT | Performed by: PHYSICIAN ASSISTANT

## 2025-04-03 PROCEDURE — 2500000004 HC RX 250 GENERAL PHARMACY W/ HCPCS (ALT 636 FOR OP/ED): Performed by: PHYSICIAN ASSISTANT

## 2025-04-03 PROCEDURE — 99214 OFFICE O/P EST MOD 30 MIN: CPT | Performed by: PHYSICIAN ASSISTANT

## 2025-04-03 PROCEDURE — 72190 X-RAY EXAM OF PELVIS: CPT

## 2025-04-03 RX ORDER — TRIAMCINOLONE ACETONIDE 40 MG/ML
1 INJECTION, SUSPENSION INTRA-ARTICULAR; INTRAMUSCULAR
Status: COMPLETED | OUTPATIENT
Start: 2025-04-03 | End: 2025-04-03

## 2025-04-03 RX ORDER — LIDOCAINE HYDROCHLORIDE 10 MG/ML
5 INJECTION, SOLUTION INFILTRATION; PERINEURAL
Status: COMPLETED | OUTPATIENT
Start: 2025-04-03 | End: 2025-04-03

## 2025-04-03 RX ADMIN — TRIAMCINOLONE ACETONIDE 1 ML: 400 INJECTION, SUSPENSION INTRA-ARTICULAR; INTRAMUSCULAR at 15:34

## 2025-04-03 RX ADMIN — LIDOCAINE HYDROCHLORIDE 5 ML: 10 INJECTION, SOLUTION INFILTRATION; PERINEURAL at 15:34

## 2025-04-03 NOTE — PROGRESS NOTES
Patient is a 74 y.o. female who is approximately 4 months s/p ORIF R acetabulum-PW/PC .  Date of surgery was 12/11/2024.  Patient advanced to WBAT RLE and using walker when she leaves the home, but using cane or nothing around the home. She has developed increased pain and tenderness along the side of her right hip, she states she cannot roll onto that side or it wakes her up. She continues in therapy sessions. Patient denies fever or chills, N/T or calf pain.     ROS: All other systems have been reviewed and are negative except as previously noted in history of present illness.   Gen: The patient is alert and oriented ×3, is in no acute distress, and appear their stated age and weight.  Psychiatric: Mood and affect are appropriate.  Eyes: Sclera are white, and pupils are round and symmetric.  ENT: Mucous membranes are moist.   Neck: Supple. Thyroid is midline.  Respiratory: Respirations are nonlabored, chest rise is symmetric.  Cardiac: Rate is regular by palpation of distal pulses.   Abdomen: Nondistended.  Integument: No obvious cutaneous lesions are noted. No signs of lymphangitis. No signs of systemic edema.      Musculoskeletal: RLE/pelvis  incisions healed  TTP over right GT bursa  mild swelling to lower leg  compartments soft  no calf tenderness  sensation intact to light touch  motor intact including TA/GS/EHL  palpable DP/PT pulses 2+   Hip motion stiff and sore with antalgic gait    X-ray: Images of pelvis reviewed personally by me today and reveal maintenance of alignment of R acetabulum PW/PC fractures with hardware in position and interval callus is forming with healing noted.      IMP:  Problem List Items Addressed This Visit       Closed displaced fracture of right acetabulum, unspecified portion of acetabulum, initial encounter (Multi)    Greater trochanteric bursitis of right hip - Primary        DISCUSSION: I discussed the conservative treatment options for bursitis including physical therapy,  NSAIDs and corticosteroid injection and I reviewed the importance for adopting a low impact lifestyle and avoidance of joint overloading activities. I explained the risks and benefits of the injection.  Patient verbalized understanding and wishes to proceed with cortisone injection for the R GT bursitis today.     Patient ID: Sue Carter is a 74 y.o. female.    L Inj/Asp: R greater trochanteric bursa on 4/3/2025 3:34 PM  Indications: pain  Details: 21 G needle, lateral approach  Medications: 5 mL lidocaine 10 mg/mL (1 %); 1 mL triamcinolone acetonide 40 mg/mL  Procedure, treatment alternatives, risks and benefits explained, specific risks discussed. Consent was given by the patient. Immediately prior to procedure a time out was called to verify the correct patient, procedure, equipment, support staff and site/side marked as required. Patient was prepped and draped in the usual sterile fashion.         PLAN:  Patient should observe for signs of infection and/or adverse reactions (redness, significant increase in pain, fever, nausea or vomiting) after receiving an injection today. If you develop any of the above symptoms, please contact my office. Patient should see the maximum effect in 3 to 5 days and can receive another cortisone injection no sooner than 3 months from today. Patient will continue with activities as tolerated, continue to work on right leg strengthening to improve gait. Mobilize to prevent stiffness. I will see patient back in 4 months and I need x-rays AP pelvis with Judet views R acetabulum next visit. All questions were answered today.

## 2025-04-09 ENCOUNTER — TREATMENT (OUTPATIENT)
Dept: PHYSICAL THERAPY | Facility: CLINIC | Age: 75
End: 2025-04-09
Payer: MEDICARE

## 2025-04-09 DIAGNOSIS — S32.401A CLOSED DISPLACED FRACTURE OF RIGHT ACETABULUM, UNSPECIFIED PORTION OF ACETABULUM, INITIAL ENCOUNTER (MULTI): ICD-10-CM

## 2025-04-09 DIAGNOSIS — R26.2 DIFFICULTY WALKING: ICD-10-CM

## 2025-04-09 DIAGNOSIS — M25.551 RIGHT HIP PAIN: ICD-10-CM

## 2025-04-09 DIAGNOSIS — M25.651 HIP STIFFNESS, RIGHT: ICD-10-CM

## 2025-04-09 PROCEDURE — 97110 THERAPEUTIC EXERCISES: CPT | Mod: GP

## 2025-04-09 NOTE — PROGRESS NOTES
Physical Therapy  Physical Therapy Treatment Note    Patient Name: Sue Carter  MRN: [id number]  Today's Date: 4/9/2025  Time Calculation  Start Time: 0715  Stop Time: 0800  Time Calculation (min): 45 min  PT Therapeutic Procedures Time Entry  Therapeutic Exercise Time Entry: 40        Insurance:  Visit number: 8 of [place]  Authorization info: No auth   Insurance Type: Payor: MEDICARE / Plan: MEDICARE PART A AND B / Product Type: *No Product type* /   Current Problem  1. Hip stiffness, right  Follow Up In Physical Therapy      2. Difficulty walking  Follow Up In Physical Therapy      3. Right hip pain  Follow Up In Physical Therapy      4. Closed displaced fracture of right acetabulum, unspecified portion of acetabulum, initial encounter (Multi)  Follow Up In Physical Therapy        General   General  Reason for Referral: R acetabulum fx (post op)  Referred By: Keyana Garcia PA-C  General Comment: DOS: 12/11/24  Precautions:  Precautions  Precautions Comment: Osteoporosis  Precautions    50% WB through right hip, until March 12th  After March 12th able to progress WBAT  12 weeks + no more ROM restrictions for flexion   Subjective:     Pt met with her ortho and he said that he does not need to see her for 4 months and that she is healing well. Pt is hoping to drive soon.   Pain     Objective:   Palpation/Observation   No tenderness to palpation of surrounding R hip musculature.      Hip ROM   Flexion- R: 90 L: 110  Abduction- R: 20 L: 35  ER- R:20 L: 30  IR- R: 15 L: 20     Hip MMT  Flexion- R: 4/5  L: 4+/5  Abduction- R: 4/5 L: 4+/5  Adduction - R: 4/5 L: 4+/5  Extension- R: 4/5 L: 4+/5    6 min walk test   478 ft with walker and no rest breaks      Outcome Measures:  Other Measures  Lower Extremity Funtional Score (LEFS): 40/80  Treatments:     THERE EX  -5 min bike for ROM  -BL shuttle press (31#) 3 x 10   -SL shuttle press (12#) 3 x 10   - Side stepping in // bars 2 x 5 down/back   - Sitting driving practice 1  x 20 foot taps  - 6 min walk test (709' with cane)       MANUAL        Assessment:  Pt tolerated session well. Pt was able to continue to progress LE strengthening without any increase in hip pain. Pt was able to show a 300' increase in distance for her 6 min walk test while using a cane instead of walker. Pt continues to progress towards goals established in the POC and should continue with skilled therapy.             Plan: Continue to progress R hip ROM and strengthening. Begin to wean from walker after March 12      Goals:  Active       PT Problem       PT Goals       Start:  03/03/25       1. Pt will increase LEFS with 65/80 or better in order to demo an increase in R hip function  2. Pt will increase R hip ROM to 0-110 in order to demo an increase in knee functional mobility   3. Pt will demo 4+/5 or all hip/knee MMT in order to demo an increase in LE strength   4. Pt will be able to ascend and descend 10 steps with reciprocal gait pattern and proper knee control in order to demo and increase in functional mobility   5. Pt will demo compliance and independence with HEP   6.  Pt will be able to walk for 10 mins with step through gait pattern and non antalgic gait pattern with no AD

## 2025-04-23 ENCOUNTER — TREATMENT (OUTPATIENT)
Dept: PHYSICAL THERAPY | Facility: CLINIC | Age: 75
End: 2025-04-23
Payer: MEDICARE

## 2025-04-23 DIAGNOSIS — M25.551 RIGHT HIP PAIN: ICD-10-CM

## 2025-04-23 DIAGNOSIS — R26.2 DIFFICULTY WALKING: ICD-10-CM

## 2025-04-23 DIAGNOSIS — S32.401A CLOSED DISPLACED FRACTURE OF RIGHT ACETABULUM, UNSPECIFIED PORTION OF ACETABULUM, INITIAL ENCOUNTER (MULTI): ICD-10-CM

## 2025-04-23 DIAGNOSIS — M25.651 HIP STIFFNESS, RIGHT: ICD-10-CM

## 2025-04-23 PROCEDURE — 97032 APPL MODALITY 1+ESTIM EA 15: CPT | Mod: GP,CQ

## 2025-04-23 PROCEDURE — 97140 MANUAL THERAPY 1/> REGIONS: CPT | Mod: GP,CQ

## 2025-04-23 NOTE — PROGRESS NOTES
Physical Therapy  Physical Therapy Treatment Note    Patient Name: Sue Carter  MRN: [id number]  Today's Date: 4/23/2025  Time Calculation  Start Time: 0415  Stop Time: 0503  Time Calculation (min): 48 min  PT Therapeutic Procedures Time Entry  Manual Therapy Time Entry: 30  PT Modalities Time Entry  E-Stim (Attended) Time Entry: 10     Insurance:  Visit number: 9 of [place]  Authorization info: No auth   Insurance Type: Payor: MEDICARE / Plan: MEDICARE PART A AND B / Product Type: *No Product type* /   Current Problem  1. Hip stiffness, right  Follow Up In Physical Therapy      2. Difficulty walking  Follow Up In Physical Therapy      3. Right hip pain  Follow Up In Physical Therapy      4. Closed displaced fracture of right acetabulum, unspecified portion of acetabulum, initial encounter (Multi)  Follow Up In Physical Therapy        General   General  Reason for Referral: R acetabulum fx (post op)  Referred By: Keyana Garcia PA-C  General Comment: DOS: 12/11/24  Precautions:  Precautions  Precautions Comment: Osteoporosis  Precautions    50% WB through right hip, until March 12th  After March 12th able to progress WBAT  12 weeks + no more ROM restrictions for flexion   Subjective:     Patient reports that she is sore today and thinks that she has a blood clot back.  Not worried about it because she has a filter.  She has had In the past.  Pain     Objective:   Palpation/Observation   No tenderness to palpation of surrounding R hip musculature.      Hip ROM   Flexion- R: 90 L: 110  Abduction- R: 20 L: 35  ER- R:20 L: 30  IR- R: 15 L: 20     Hip MMT  Flexion- R: 4/5  L: 4+/5  Abduction- R: 4/5 L: 4+/5  Adduction - R: 4/5 L: 4+/5  Extension- R: 4/5 L: 4+/5    6 min walk test   478 ft with walker and no rest breaks      Outcome Measures:  Other Measures  Lower Extremity Funtional Score (LEFS): 40/80  Treatments:     THERE EX  (bike only on 4/23/25)  Exercises NOT done today  -6min bike for ROM  -BL shuttle press  (31#) 3 x 10   -SL shuttle press (12#) 3 x 10   - Side stepping in // bars 2 x 5 down/back   - Sitting driving practice 1 x 20 foot taps  - 6 min walk test (709' with cane)       MANUAL  STM to R glut  PROM to R hip    Thermaprobe to R glut x 10  min with 5 bar heating  Amb after thermaprobe    Assessment:  Pt tolerated session well.  She had less pain and tightness following treatment today.  Pt continues to progress towards goals established in the POC and should continue with skilled therapy.             Plan: Continue to progress R hip ROM and strengthening.  Resume active strengthening as able.     Goals:  Active       PT Problem       PT Goals       Start:  03/03/25       1. Pt will increase LEFS with 65/80 or better in order to demo an increase in R hip function  2. Pt will increase R hip ROM to 0-110 in order to demo an increase in knee functional mobility   3. Pt will demo 4+/5 or all hip/knee MMT in order to demo an increase in LE strength   4. Pt will be able to ascend and descend 10 steps with reciprocal gait pattern and proper knee control in order to demo and increase in functional mobility   5. Pt will demo compliance and independence with HEP   6.  Pt will be able to walk for 10 mins with step through gait pattern and non antalgic gait pattern with no AD

## 2025-04-24 ENCOUNTER — TREATMENT (OUTPATIENT)
Dept: PHYSICAL THERAPY | Facility: CLINIC | Age: 75
End: 2025-04-24
Payer: MEDICARE

## 2025-04-24 DIAGNOSIS — M25.551 RIGHT HIP PAIN: ICD-10-CM

## 2025-04-24 DIAGNOSIS — S32.401A CLOSED DISPLACED FRACTURE OF RIGHT ACETABULUM, UNSPECIFIED PORTION OF ACETABULUM, INITIAL ENCOUNTER (MULTI): ICD-10-CM

## 2025-04-24 DIAGNOSIS — R26.2 DIFFICULTY WALKING: ICD-10-CM

## 2025-04-24 DIAGNOSIS — M25.651 HIP STIFFNESS, RIGHT: ICD-10-CM

## 2025-04-24 PROCEDURE — 97110 THERAPEUTIC EXERCISES: CPT | Mod: GP

## 2025-04-24 NOTE — PROGRESS NOTES
Physical Therapy  Physical Therapy Treatment Note    Patient Name: Sue Carter  MRN: [id number]  Today's Date: 4/24/2025  Time Calculation  Start Time: 0845  Stop Time: 0930  Time Calculation (min): 45 min  PT Therapeutic Procedures Time Entry  Therapeutic Exercise Time Entry: 40        Insurance:  Visit number: 10 of [place]  Authorization info: No auth   Insurance Type: Payor: MEDICARE / Plan: MEDICARE PART A AND B / Product Type: *No Product type* /   Current Problem  1. Hip stiffness, right  Follow Up In Physical Therapy      2. Difficulty walking  Follow Up In Physical Therapy      3. Right hip pain  Follow Up In Physical Therapy      4. Closed displaced fracture of right acetabulum, unspecified portion of acetabulum, initial encounter (Multi)  Follow Up In Physical Therapy        General   General  Reason for Referral: R acetabulum fx (post op)  Referred By: Keyana Garcia PA-C  General Comment: DOS: 12/11/24  Precautions:  Precautions  Precautions Comment: Osteoporosis  Precautions    50% WB through right hip, until March 12th  After March 12th able to progress WBAT  12 weeks + no more ROM restrictions for flexion   Subjective:     Pt reports that she has been sore in her right calf and outer right hip.  She is using the cane all the time.  Not sure if her pain is more with WB positions or all the time  Pain     Objective:   Palpation/Observation   No tenderness to palpation of surrounding R hip musculature.      Hip ROM   Flexion- R: 90 L: 110  Abduction- R: 20 L: 35  ER- R:20 L: 30  IR- R: 15 L: 20     Hip MMT  Flexion- R: 4/5  L: 4+/5  Abduction- R: 4/5 L: 4+/5  Adduction - R: 4/5 L: 4+/5  Extension- R: 4/5 L: 4+/5    6 min walk test   478 ft with walker and no rest breaks      Outcome Measures:  Other Measures  Lower Extremity Funtional Score (LEFS): 40/80  Treatments:     THERE EX  (bike only on 4/23/25)  Exercises NOT done today  -7min bike for ROM  -PROM right hip, flexion, ER,   - BKFO x 12 reps  -  mini glute lift x 6 secs x 12 reps ( over bolster)  - hip adduction x 6 secs x 12 reps   - hip abduction isometric 6 secs x 12 rep   - SAQ x 2.5 lbs x 12 R and L side  - Side stepping in // bars 2 x 5 down/back   - standing weight shift x side to side x no hands x 2 min  - forward step up and over small brooks x leading with the right  - mini quarter squat at door x 12 reps     TE x 3    MANUAL ( not today)  STM to R glut  PROM to R hip    Thermaprobe to R glut x 10  min with 5 bar heating  Amb after thermaprobe    Assessment:  Pt tolerated session well.  She had less pain and tightness following treatment today.  Pt educated on the benefit for aquatic therapy to decrease stress/strain on the right LE and decrease the load in the weight bearing position.  Pt continues to progress towards goals established in the POC and should continue with skilled therapy.             Plan: Continue to progress R hip ROM and strengthening.  Resume active strengthening as able.     Goals:  Active       PT Problem       PT Goals       Start:  03/03/25       1. Pt will increase LEFS with 65/80 or better in order to demo an increase in R hip function  2. Pt will increase R hip ROM to 0-110 in order to demo an increase in knee functional mobility   3. Pt will demo 4+/5 or all hip/knee MMT in order to demo an increase in LE strength   4. Pt will be able to ascend and descend 10 steps with reciprocal gait pattern and proper knee control in order to demo and increase in functional mobility   5. Pt will demo compliance and independence with HEP   6.  Pt will be able to walk for 10 mins with step through gait pattern and non antalgic gait pattern with no AD

## 2025-05-01 ENCOUNTER — APPOINTMENT (OUTPATIENT)
Dept: PRIMARY CARE | Facility: CLINIC | Age: 75
End: 2025-05-01
Payer: MEDICARE

## 2025-05-01 ENCOUNTER — TREATMENT (OUTPATIENT)
Dept: PHYSICAL THERAPY | Facility: CLINIC | Age: 75
End: 2025-05-01
Payer: MEDICARE

## 2025-05-01 DIAGNOSIS — M25.551 RIGHT HIP PAIN: ICD-10-CM

## 2025-05-01 DIAGNOSIS — S32.401A CLOSED DISPLACED FRACTURE OF RIGHT ACETABULUM, UNSPECIFIED PORTION OF ACETABULUM, INITIAL ENCOUNTER (MULTI): ICD-10-CM

## 2025-05-01 DIAGNOSIS — R26.2 DIFFICULTY WALKING: ICD-10-CM

## 2025-05-01 DIAGNOSIS — M25.651 HIP STIFFNESS, RIGHT: ICD-10-CM

## 2025-05-01 PROCEDURE — 97140 MANUAL THERAPY 1/> REGIONS: CPT | Mod: GP

## 2025-05-01 PROCEDURE — 97110 THERAPEUTIC EXERCISES: CPT | Mod: GP

## 2025-05-01 NOTE — PROGRESS NOTES
Physical Therapy  Physical Therapy Treatment Note    Patient Name: Sue Carter  MRN: [id number]  Today's Date: 5/1/2025  Time Calculation  Start Time: 0915  Stop Time: 1000  Time Calculation (min): 45 min  PT Therapeutic Procedures Time Entry  Manual Therapy Time Entry: 13  Therapeutic Exercise Time Entry: 27        Insurance:  Visit number: 11 of [place]  Authorization info: No auth   Insurance Type: Payor: MEDICARE / Plan: MEDICARE PART A AND B / Product Type: *No Product type* /   Current Problem  1. Hip stiffness, right  Follow Up In Physical Therapy      2. Difficulty walking  Follow Up In Physical Therapy      3. Right hip pain  Follow Up In Physical Therapy      4. Closed displaced fracture of right acetabulum, unspecified portion of acetabulum, initial encounter (Multi)  Follow Up In Physical Therapy        General  Reason for Referral: R acetabulum fx (post op)  Referred By: Keyana Garcia PA-C  General Comment: DOS: 12/11/24General  Reason for Referral: R acetabulum fx (post op)  Referred By: Keyana Garcia PA-C  General Comment: DOS: 12/11/24  Precautions:  Precautions  Precautions Comment: Osteoporosis  Precautions  Precautions  Precautions Comment: Osteoporosis 50% WB through right hip, until March 12th  After March 12th able to progress WBAT  12 weeks + no more ROM restrictions for flexion   Subjective:     Pt has continued to have 5/10 pain from time to time throughout the day in her R lateral hip.   Pain     Objective:   Palpation/Observation   No tenderness to palpation of surrounding R hip musculature.      Hip ROM   Flexion- R: 90 L: 110  Abduction- R: 20 L: 35  ER- R:20 L: 30  IR- R: 15 L: 20     Hip MMT  Flexion- R: 4/5  L: 4+/5  Abduction- R: 4/5 L: 4+/5  Adduction - R: 4/5 L: 4+/5  Extension- R: 4/5 L: 4+/5    6 min walk test   478 ft with walker and no rest breaks      Outcome Measures:  Other Measures  Lower Extremity Funtional Score (LEFS): 40/80  Treatments:     THERE EX    -7min  bike for ROM  -PROM right hip, flexion, ER,   - Supine SAQ (0,2,3#) 3 x 10   - Supine hip flexion isometric 4 x 30 sec on/off   - Lateral lunge to foam 2 x 6 each way   - Banded golf swing 3 x 10       MANUAL   STM to R glut          Assessment:  Pt tolerated session well.  Pt did very well with balance exercises. She was able to perform a mock golf swing with banded resistance while maintaining good balance. She was educated that she can chip and putt as long as she is feeling steady on her feet and is at a tolerable pain level. Pt continues to progress towards goals established in the POC and should continue with skilled therapy.               Plan: Continue to progress R hip ROM and strengthening.  Resume active strengthening as able.     Goals:  Active       PT Problem       PT Goals       Start:  03/03/25       1. Pt will increase LEFS with 65/80 or better in order to demo an increase in R hip function  2. Pt will increase R hip ROM to 0-110 in order to demo an increase in knee functional mobility   3. Pt will demo 4+/5 or all hip/knee MMT in order to demo an increase in LE strength   4. Pt will be able to ascend and descend 10 steps with reciprocal gait pattern and proper knee control in order to demo and increase in functional mobility   5. Pt will demo compliance and independence with HEP   6.  Pt will be able to walk for 10 mins with step through gait pattern and non antalgic gait pattern with no AD

## 2025-05-06 ENCOUNTER — TREATMENT (OUTPATIENT)
Dept: PHYSICAL THERAPY | Facility: CLINIC | Age: 75
End: 2025-05-06
Payer: MEDICARE

## 2025-05-06 DIAGNOSIS — R26.2 DIFFICULTY WALKING: ICD-10-CM

## 2025-05-06 DIAGNOSIS — S32.401A CLOSED DISPLACED FRACTURE OF RIGHT ACETABULUM, UNSPECIFIED PORTION OF ACETABULUM, INITIAL ENCOUNTER (MULTI): ICD-10-CM

## 2025-05-06 DIAGNOSIS — M25.651 HIP STIFFNESS, RIGHT: ICD-10-CM

## 2025-05-06 DIAGNOSIS — M25.551 RIGHT HIP PAIN: ICD-10-CM

## 2025-05-06 PROCEDURE — 97110 THERAPEUTIC EXERCISES: CPT | Mod: GP

## 2025-05-06 NOTE — PROGRESS NOTES
Physical Therapy  Physical Therapy Treatment Note    Patient Name: Sue Carter  MRN: [id number]  Today's Date: 5/6/2025  Time Calculation  Start Time: 1345  Stop Time: 1430  Time Calculation (min): 45 min  PT Therapeutic Procedures Time Entry  Therapeutic Exercise Time Entry: 40        Insurance:  Visit number: 12 of [place]  Authorization info: No auth   Insurance Type: Payor: MEDICARE / Plan: MEDICARE PART A AND B / Product Type: *No Product type* /   Current Problem  1. Hip stiffness, right  Follow Up In Physical Therapy      2. Difficulty walking  Follow Up In Physical Therapy      3. Right hip pain  Follow Up In Physical Therapy      4. Closed displaced fracture of right acetabulum, unspecified portion of acetabulum, initial encounter (Multi)  Follow Up In Physical Therapy        General   General  Reason for Referral: R acetabulum fx (post op)  Referred By: Keyana Garcia PA-C  General Comment: DOS: 12/11/24  Precautions:  Precautions  Precautions Comment: Osteoporosis  Precautions    50% WB through right hip, until March 12th  After March 12th able to progress WBAT  12 weeks + no more ROM restrictions for flexion   Subjective:     Pt feels like her pain has not gotten any worse. She has still been walking with a cane. She is hoping to golf next week and only putt.  Pain     Objective:   Palpation/Observation   No tenderness to palpation of surrounding R hip musculature.      Hip ROM   Flexion- R: 90 L: 110  Abduction- R: 20 L: 35  ER- R:20 L: 30  IR- R: 15 L: 20     Hip MMT  Flexion- R: 4/5  L: 4+/5  Abduction- R: 4/5 L: 4+/5  Adduction - R: 4/5 L: 4+/5  Extension- R: 4/5 L: 4+/5    6 min walk test   478 ft with walker and no rest breaks      Outcome Measures:  Other Measures  Lower Extremity Funtional Score (LEFS): 40/80  Treatments:     THERE EX    -7min bike for ROM  - BL shuttle press (37#) 3 x 15   - palof side steps 2 x 10 (yellow)   - Tandem stance banded perturbation 2 x 30 sec each way   - Banded  rotation in golf stance 2 x 10   - Mock 1/4 golf swing 3 x 10       MANUAL             Assessment:  Pt tolerated session well.  Pt was able to continue to work on balance for golf activities and did very well. She was educated that we will begin to try and get her weaned off of her cane starting next visit. Pt continues to progress towards goals established in the POC and should continue with skilled therapy.               Plan: Continue to progress R hip ROM and strengthening.  Resume active strengthening as able.     Goals:  Active       PT Problem       PT Goals       Start:  03/03/25       1. Pt will increase LEFS with 65/80 or better in order to demo an increase in R hip function  2. Pt will increase R hip ROM to 0-110 in order to demo an increase in knee functional mobility   3. Pt will demo 4+/5 or all hip/knee MMT in order to demo an increase in LE strength   4. Pt will be able to ascend and descend 10 steps with reciprocal gait pattern and proper knee control in order to demo and increase in functional mobility   5. Pt will demo compliance and independence with HEP   6.  Pt will be able to walk for 10 mins with step through gait pattern and non antalgic gait pattern with no AD

## 2025-05-08 ENCOUNTER — TREATMENT (OUTPATIENT)
Dept: PHYSICAL THERAPY | Facility: CLINIC | Age: 75
End: 2025-05-08
Payer: MEDICARE

## 2025-05-08 DIAGNOSIS — R26.2 DIFFICULTY WALKING: ICD-10-CM

## 2025-05-08 DIAGNOSIS — M25.551 RIGHT HIP PAIN: ICD-10-CM

## 2025-05-08 DIAGNOSIS — S32.401A CLOSED DISPLACED FRACTURE OF RIGHT ACETABULUM, UNSPECIFIED PORTION OF ACETABULUM, INITIAL ENCOUNTER (MULTI): ICD-10-CM

## 2025-05-08 DIAGNOSIS — M25.651 HIP STIFFNESS, RIGHT: ICD-10-CM

## 2025-05-08 PROCEDURE — 97110 THERAPEUTIC EXERCISES: CPT | Mod: GP

## 2025-05-08 NOTE — PROGRESS NOTES
"Physical Therapy  Physical Therapy Treatment Note    Patient Name: Sue Carter  MRN: [id number]  Today's Date: 5/8/2025  Time Calculation  Start Time: 1000  Stop Time: 1045  Time Calculation (min): 45 min  PT Therapeutic Procedures Time Entry  Therapeutic Exercise Time Entry: 40        Insurance:  Visit number: 13 of [place]  Authorization info: No auth   Insurance Type: Payor: MEDICARE / Plan: MEDICARE PART A AND B / Product Type: *No Product type* /   Current Problem  1. Hip stiffness, right  Follow Up In Physical Therapy      2. Difficulty walking  Follow Up In Physical Therapy      3. Right hip pain  Follow Up In Physical Therapy      4. Closed displaced fracture of right acetabulum, unspecified portion of acetabulum, initial encounter (Multi)  Follow Up In Physical Therapy        General   General  Reason for Referral: R acetabulum fx (post op)  Referred By: Keyana Garcia PA-C  General Comment: DOS: 12/11/24  Precautions:  Precautions  Precautions Comment: Osteoporosis  Precautions    50% WB through right hip, until March 12th  After March 12th able to progress WBAT  12 weeks + no more ROM restrictions for flexion   Subjective:     Pt has been having increased pain in her hip and low back but feel it is due to to weather. Pt verbalized that she has been struggling to lift her hip up too get her socks and shoes on.     Pain     Objective:   Palpation/Observation   No tenderness to palpation of surrounding R hip musculature.      Hip ROM   Flexion- R: 90 L: 110  Abduction- R: 20 L: 35  ER- R:20 L: 30  IR- R: 15 L: 20     Hip MMT  Flexion- R: 4/5  L: 4+/5  Abduction- R: 4/5 L: 4+/5  Adduction - R: 4/5 L: 4+/5  Extension- R: 4/5 L: 4+/5    6 min walk test   478 ft with walker and no rest breaks      Outcome Measures:  Other Measures  Lower Extremity Funtional Score (LEFS): 40/80  Treatments:     THERE EX    -7 min bike for ROM  - 6\" step up 2 x 10   - lateral lunge to foam 2 x 10   - Supine PROM hip flexion 2 x 10 "   - Supine banded hip flexion (yellow) 2 x 10   - Supine clamshell (green) 3 x 10     MANUAL             Assessment:  Pt tolerated session well.  Pt was able to continue to work on balance without any UE support. Pt was educated to continue to work on hip AAROM and hip flexor strengthening to help with getting her socks and shoes on. Pt continues to progress towards goals established in the POC and should continue with skilled therapy.           Plan: Continue to progress R hip ROM and strengthening.  Resume active strengthening as able.     Goals:  Active       PT Problem       PT Goals       Start:  03/03/25       1. Pt will increase LEFS with 65/80 or better in order to demo an increase in R hip function  2. Pt will increase R hip ROM to 0-110 in order to demo an increase in knee functional mobility   3. Pt will demo 4+/5 or all hip/knee MMT in order to demo an increase in LE strength   4. Pt will be able to ascend and descend 10 steps with reciprocal gait pattern and proper knee control in order to demo and increase in functional mobility   5. Pt will demo compliance and independence with HEP   6.  Pt will be able to walk for 10 mins with step through gait pattern and non antalgic gait pattern with no AD

## 2025-05-12 NOTE — PROGRESS NOTES
Subjective   Sue Carter is a 74 y.o. female who presents for here for follow-up.  HPI  73-year-old female with recommended patient.  Cerebral Amyloid angiopathy with related inflammation   completed steroid therapy follows at [facility]  Dr Mendez at [facility] ,concerned one episode of chest pain one episodes lasting one hour and gone,  no new episodes reported, patient is active with no major medical denies chest pain shortness of breath fever chill nausea vomiting constipation diarrhea dysuria urgency frequency.  Patient was seen for physical exam and blood work in February 7, 2024 patient is with PT at home.  Complaining of rt hip pain worsening, was given IA kenalog April 2025 with some help.       Review of Systems  10 system review pertinent as above  Objective     Visit Vitals  Temp 36.1 °C (97 °F) (Temporal)          Physical Exam  HEENT: Atraumatic normocephalic the pupils are equal and round and reactive to light the sclerae nonicteric extraocular motion are intact.  Neck: Is supple without JVD no carotid bruits the trachea is midline there are no masses pulses are equal and bilateral with normal upstroke.  Skin: Normal.  Skin good texture.  Moist.  Good turgor.  No lesions, no rashes.  Lymph: No lymphadenopathy appreciated, no masses, no lesions  Lungs: Are clear to auscultation and percussion, good breath sounds bilaterally, no rhonchi, no wheezing, good diaphragmatic excursion.  Heart: Normal rate and normal rhythm S1, S2, no S3, no gallop, murmur or rub.  Abdomen: Soft, nontender, no organomegaly, good bowel sounds.    Extremities: Full range of motion, good pulses bilateral.  No cyanosis, no clubbing or edema.  Neuro: Cranial nerves II-XII are grossly intact there is no sensory or motor deficits.  Able to move all extremities.      Assessment/Plan         Patient is here for a follow-up    Sp acetabular Fx Rt  Repair Dr Benitez [facility]  Physical therapy per Ortho recommendation  Painful when walking      History of  cerebral amyloid angiopathy  With related inflammation  Diagnosed incidentally in 2020 December  Follows with Dr. Mendez [facility]  Completed steroids SP repeat MRI 05/05/2024  Recently repeated MRI Friday 10/25/2024  Personally reviewed with patient  We will also review with neurosurgery at the [facility]  Patient will see neurosurgery Will review at  with Dr Mendez  Patient is instructed to follow regularly    History of  Fasting blood work BMP    Prevention  Colonoscopy 05/05/2022 next in 2027  Mammogram 02/07/2024  Bone density 2021[facility]    EKG 2019 NSR Nl EKG    CACS score =47 LAD low risk LDL 90 Mg/Dl 02/07/24  In setting of Fhx CAD  Father age 53 Se Fhx above  No angina with exercise    Cholesterol 2022 October 173, LDL 97    Immunizations  Flu vaccine 12/2023  Pneumonia vaccine 05/2016   Shingles vaccine declined  [place] vaccine 2/2 and 3 boosters  RSV declined      Hx DVT 2020  And Jan 02/2025 (non occlusive rt common femoral )  Yoana filter (07/01/2020) [employer]ite   Ref [id number]  Lot [id number]  [facility] Dr GÓMEZ Mccoy  Doing well  No asa due to CAA above  On tyle Motrin     Problem List Items Addressed This Visit       Acute deep vein thrombosis (DVT) of left lower extremity    Relevant Orders    CBC w/5 Part Differential, [facility]    Basic Metabolic Panel    Autoimmune disorder (Multi)    Cerebral amyloid angiopathy (CODE)    Closed fracture of right hip with routine healing     Other Visit Diagnoses         Routine general medical examination at health care facility    -  Primary    Relevant Orders    1 Year Follow Up In Primary Care - Wellness Exam      Dyslipidemia        Relevant Orders    Lipid Panel    AST    ALT      Vitamin D insufficiency        Relevant Orders    Vitamin D 25-Hydroxy,Total (for eval of Vitamin D levels)                  Yuval Gonzalez MD

## 2025-05-13 ENCOUNTER — TREATMENT (OUTPATIENT)
Dept: PHYSICAL THERAPY | Facility: CLINIC | Age: 75
End: 2025-05-13
Payer: MEDICARE

## 2025-05-13 DIAGNOSIS — R26.2 DIFFICULTY WALKING: ICD-10-CM

## 2025-05-13 DIAGNOSIS — M25.551 RIGHT HIP PAIN: ICD-10-CM

## 2025-05-13 DIAGNOSIS — M25.651 HIP STIFFNESS, RIGHT: ICD-10-CM

## 2025-05-13 DIAGNOSIS — S32.401A CLOSED DISPLACED FRACTURE OF RIGHT ACETABULUM, UNSPECIFIED PORTION OF ACETABULUM, INITIAL ENCOUNTER (MULTI): ICD-10-CM

## 2025-05-13 PROCEDURE — 97140 MANUAL THERAPY 1/> REGIONS: CPT | Mod: GP

## 2025-05-13 PROCEDURE — 97110 THERAPEUTIC EXERCISES: CPT | Mod: GP

## 2025-05-13 NOTE — PROGRESS NOTES
Physical Therapy  Physical Therapy Treatment Note    Patient Name: Sue Carter  MRN: [id number]  Today's Date: 5/13/2025  Time Calculation  Start Time: 1345  Stop Time: 1430  Time Calculation (min): 45 min  PT Therapeutic Procedures Time Entry  Manual Therapy Time Entry: 12  Therapeutic Exercise Time Entry: 26        Insurance:  Visit number: 14 of [place]  Authorization info: No auth   Insurance Type: Payor: MEDICARE / Plan: MEDICARE PART A AND B / Product Type: *No Product type* /   Current Problem  1. Hip stiffness, right  Follow Up In Physical Therapy      2. Difficulty walking  Follow Up In Physical Therapy      3. Right hip pain  Follow Up In Physical Therapy      4. Closed displaced fracture of right acetabulum, unspecified portion of acetabulum, initial encounter (Multi)  Follow Up In Physical Therapy        General   General  Reason for Referral: R acetabulum fx (post op)  Referred By: Keyana Garcia PA-C  General Comment: DOS: 12/11/24  Precautions:  Precautions  Precautions Comment: Osteoporosis  Precautions    50% WB through right hip, until March 12th  After March 12th able to progress WBAT  12 weeks + no more ROM restrictions for flexion   Subjective:     Pt has continued to have increased pain over her R hip bursa. She has been having tenderness to the touch and is not able to lay on that side.    Pain     Objective:   Palpation/Observation   No tenderness to palpation of surrounding R hip musculature.      Hip ROM   Flexion- R: 90 L: 110  Abduction- R: 20 L: 35  ER- R:20 L: 30  IR- R: 15 L: 20     Hip MMT  Flexion- R: 4/5  L: 4+/5  Abduction- R: 4/5 L: 4+/5  Adduction - R: 4/5 L: 4+/5  Extension- R: 4/5 L: 4+/5    6 min walk test   478 ft with walker and no rest breaks      Outcome Measures:  Other Measures  Lower Extremity Funtional Score (LEFS): 40/80  Treatments:     THERE EX    -7 min bike for ROM  - Supine isometric clamshell 5 x 30 sec on/off   - Supine adduction 3 x 10   - Supine AAROM hip  "flexion 2 x 10   - 4\" step up 2 x 10      MANUAL     STM to lateral glute         Assessment:  Pt tolerated session well.  Pt tolerated isometrics for her bursitis pain well. She was educated to not perform any exercises that cause her lateral hip pain over the next couple of weeks to see if her bursitis pain decreases. Pt continues to progress towards goals established in the POC and should continue with skilled therapy.       Plan: Continue to progress R hip ROM and strengthening.  Resume active strengthening as able.     Goals:  Active       PT Problem       PT Goals       Start:  03/03/25       1. Pt will increase LEFS with 65/80 or better in order to demo an increase in R hip function  2. Pt will increase R hip ROM to 0-110 in order to demo an increase in knee functional mobility   3. Pt will demo 4+/5 or all hip/knee MMT in order to demo an increase in LE strength   4. Pt will be able to ascend and descend 10 steps with reciprocal gait pattern and proper knee control in order to demo and increase in functional mobility   5. Pt will demo compliance and independence with HEP   6.  Pt will be able to walk for 10 mins with step through gait pattern and non antalgic gait pattern with no AD              "

## 2025-05-15 ENCOUNTER — TREATMENT (OUTPATIENT)
Dept: PHYSICAL THERAPY | Facility: CLINIC | Age: 75
End: 2025-05-15
Payer: MEDICARE

## 2025-05-15 ENCOUNTER — APPOINTMENT (OUTPATIENT)
Dept: PRIMARY CARE | Facility: CLINIC | Age: 75
End: 2025-05-15
Payer: MEDICARE

## 2025-05-15 VITALS — TEMPERATURE: 97 F

## 2025-05-15 DIAGNOSIS — Z00.00 ROUTINE GENERAL MEDICAL EXAMINATION AT HEALTH CARE FACILITY: Primary | ICD-10-CM

## 2025-05-15 DIAGNOSIS — I68.0 CEREBRAL AMYLOID ANGIOPATHY (CODE): ICD-10-CM

## 2025-05-15 DIAGNOSIS — M25.551 RIGHT HIP PAIN: ICD-10-CM

## 2025-05-15 DIAGNOSIS — S32.401A CLOSED DISPLACED FRACTURE OF RIGHT ACETABULUM, UNSPECIFIED PORTION OF ACETABULUM, INITIAL ENCOUNTER (MULTI): ICD-10-CM

## 2025-05-15 DIAGNOSIS — J43.2 CENTRILOBULAR EMPHYSEMA (MULTI): ICD-10-CM

## 2025-05-15 DIAGNOSIS — E78.5 DYSLIPIDEMIA: ICD-10-CM

## 2025-05-15 DIAGNOSIS — M25.651 HIP STIFFNESS, RIGHT: ICD-10-CM

## 2025-05-15 DIAGNOSIS — S72.001D CLOSED FRACTURE OF RIGHT HIP WITH ROUTINE HEALING: ICD-10-CM

## 2025-05-15 DIAGNOSIS — D89.89 AUTOIMMUNE DISORDER (MULTI): ICD-10-CM

## 2025-05-15 DIAGNOSIS — I82.4Y2 ACUTE DEEP VEIN THROMBOSIS (DVT) OF PROXIMAL VEIN OF LEFT LOWER EXTREMITY: ICD-10-CM

## 2025-05-15 DIAGNOSIS — R26.2 DIFFICULTY WALKING: ICD-10-CM

## 2025-05-15 DIAGNOSIS — E55.9 VITAMIN D INSUFFICIENCY: ICD-10-CM

## 2025-05-15 LAB
25(OH)D3 SERPL-MCNC: 46 NG/ML (ref 30–100)
ALT SERPL W P-5'-P-CCNC: 19 U/L (ref 16–63)
ANION GAP SERPL CALC-SCNC: 10 MMOL/L (ref 10–20)
AST SERPL W P-5'-P-CCNC: 23 U/L (ref 15–37)
BASOPHILS # BLD AUTO: 0.05 X10*3/UL (ref 0.1–1.6)
BASOPHILS NFR BLD AUTO: 0.62 % (ref 0–0.3)
BUN SERPL-MCNC: 19 MG/DL (ref 7–18)
CALCIUM SERPL-MCNC: 9.1 MG/DL (ref 8.5–10.1)
CHLORIDE SERPL-SCNC: 102 MMOL/L (ref 98–107)
CHOLEST SERPL-MCNC: 183 MG/DL (ref 0–199)
CHOLESTEROL/HDL RATIO: 2.4 (ref 4.2–7)
CO2 SERPL-SCNC: 32 MMOL/L (ref 21–32)
CREAT SERPL-MCNC: 0.74 MG/DL (ref 0.6–1.1)
EGFRCR SERPLBLD CKD-EPI 2021: 84 ML/MIN/1.73M*2
EOSINOPHIL # BLD AUTO: 0.3 X10*3/UL (ref 0.04–0.5)
EOSINOPHIL NFR BLD AUTO: 3.65 % (ref 0.7–7)
ERYTHROCYTE [DISTWIDTH] IN BLOOD BY AUTOMATED COUNT: 14.8 % (ref 11.5–14.5)
GLUCOSE SERPL-MCNC: 88 MG/DL (ref 74–100)
HCT VFR BLD AUTO: 38 % (ref 36.6–46.6)
HDLC SERPL-MCNC: 75 MG/DL (ref 40–59)
HGB BLD-MCNC: 13.04 G/DL (ref 12–15.4)
IS PATIENT FASTING: YES
LDLC SERPL DIRECT ASSAY-MCNC: 88 MG/DL (ref 0–100)
LYMPHOCYTES # BLD AUTO: 2.09 X10*3/UL (ref 0–6)
LYMPHOCYTES NFR BLD AUTO: 25.32 % (ref 20.5–51.1)
MCH RBC QN AUTO: 30.1 PG (ref 26–32)
MCHC RBC AUTO-ENTMCNC: 34.3 G/DL (ref 31–38)
MCV RBC AUTO: 87.8 FL (ref 80–96)
MONOCYTES # BLD AUTO: 0.67 X10*3/UL (ref 1.6–24.9)
MONOCYTES NFR BLD AUTO: 8.17 % (ref 1.7–9.3)
NEUTROPHILS # BLD AUTO: 5.14 X10*3/UL (ref 1.4–6.5)
NEUTROPHILS NFR BLD AUTO: 62.24 % (ref 42.2–75.2)
PLATELET # BLD AUTO: 265.8 X10*3/UL (ref 150–450)
PMV BLD AUTO: 9.37 FL (ref 7.8–11)
POTASSIUM SERPL-SCNC: 4.7 MMOL/L (ref 3.5–5.1)
RBC # BLD AUTO: 4.33 X10*6/UL (ref 3.9–5.3)
SODIUM SERPL-SCNC: 139 MMOL/L (ref 136–145)
TRIGL SERPL-MCNC: 59 MG/DL
WBC # BLD AUTO: 8.25 X10*3/UL (ref 4.5–10.5)

## 2025-05-15 PROCEDURE — 97110 THERAPEUTIC EXERCISES: CPT | Mod: GP

## 2025-05-15 PROCEDURE — 97140 MANUAL THERAPY 1/> REGIONS: CPT | Mod: GP

## 2025-05-15 ASSESSMENT — ACTIVITIES OF DAILY LIVING (ADL)
JUDGMENT_ADEQUATE_SAFELY_COMPLETE_DAILY_ACTIVITIES: YES
WALKS IN HOME: INDEPENDENT
GROOMING: INDEPENDENT
HEARING - LEFT EAR: FUNCTIONAL
MANAGING FINANCES: INDEPENDENT
HEARING - RIGHT EAR: FUNCTIONAL
TAKING MEDICATION: INDEPENDENT
ADEQUATE_TO_COMPLETE_ADL: YES
TOILETING: INDEPENDENT
JUDGMENT_ADEQUATE_SAFELY_COMPLETE_DAILY_ACTIVITIES: YES
STIL DRIVING: YES
DOING HOUSEWORK: INDEPENDENT
EATING: INDEPENDENT
PILL BOX USED: NO
FEEDING: INDEPENDENT
FEEDING YOURSELF: INDEPENDENT
USING TELEPHONE: INDEPENDENT
ADEQUATE_TO_COMPLETE_ADL: YES
PATIENT'S MEMORY ADEQUATE TO SAFELY COMPLETE DAILY ACTIVITIES?: YES
BATHING: INDEPENDENT
BATHING: INDEPENDENT
PREPARING MEALS: INDEPENDENT
TOILETING: INDEPENDENT
USING TRANSPORTATION: INDEPENDENT
DRESSING: INDEPENDENT
NEEDS ASSISTANCE WITH FOOD: INDEPENDENT
DRESSING YOURSELF: INDEPENDENT
GROCERY SHOPPING: INDEPENDENT

## 2025-05-15 ASSESSMENT — ANXIETY QUESTIONNAIRES
5. BEING SO RESTLESS THAT IT IS HARD TO SIT STILL: NOT AT ALL
1. FEELING NERVOUS, ANXIOUS, OR ON EDGE: NOT AT ALL
4. TROUBLE RELAXING: NOT AT ALL
7. FEELING AFRAID AS IF SOMETHING AWFUL MIGHT HAPPEN: NOT AT ALL
IF YOU CHECKED OFF ANY PROBLEMS ON THIS QUESTIONNAIRE, HOW DIFFICULT HAVE THESE PROBLEMS MADE IT FOR YOU TO DO YOUR WORK, TAKE CARE OF THINGS AT HOME, OR GET ALONG WITH OTHER PEOPLE: NOT DIFFICULT AT ALL
2. NOT BEING ABLE TO STOP OR CONTROL WORRYING: NOT AT ALL
GAD7 TOTAL SCORE: 0
6. BECOMING EASILY ANNOYED OR IRRITABLE: NOT AT ALL
3. WORRYING TOO MUCH ABOUT DIFFERENT THINGS: NOT AT ALL

## 2025-05-15 ASSESSMENT — COGNITIVE AND FUNCTIONAL STATUS - GENERAL
VERBAL FLUENCY - ANIMAL NAMES (0 TO 25): 25
TRAIL MAKING TEST: PATIENT COMPLETES TRAIL MAKING TEST PROPERLY.

## 2025-05-15 ASSESSMENT — PATIENT HEALTH QUESTIONNAIRE - PHQ9
7. TROUBLE CONCENTRATING ON THINGS, SUCH AS READING THE NEWSPAPER OR WATCHING TELEVISION: NOT AT ALL
6. FEELING BAD ABOUT YOURSELF - OR THAT YOU ARE A FAILURE OR HAVE LET YOURSELF OR YOUR FAMILY DOWN: NOT AT ALL
4. FEELING TIRED OR HAVING LITTLE ENERGY: NOT AT ALL
SUM OF ALL RESPONSES TO PHQ QUESTIONS 1-9: 3
8. MOVING OR SPEAKING SO SLOWLY THAT OTHER PEOPLE COULD HAVE NOTICED. OR THE OPPOSITE, BEING SO FIGETY OR RESTLESS THAT YOU HAVE BEEN MOVING AROUND A LOT MORE THAN USUAL: NOT AT ALL
9. THOUGHTS THAT YOU WOULD BE BETTER OFF DEAD, OR OF HURTING YOURSELF: NOT AT ALL
5. POOR APPETITE OR OVEREATING: NOT AT ALL
2. FEELING DOWN, DEPRESSED OR HOPELESS: NOT AT ALL
1. LITTLE INTEREST OR PLEASURE IN DOING THINGS: NEARLY EVERY DAY
10. IF YOU CHECKED OFF ANY PROBLEMS, HOW DIFFICULT HAVE THESE PROBLEMS MADE IT FOR YOU TO DO YOUR WORK, TAKE CARE OF THINGS AT HOME, OR GET ALONG WITH OTHER PEOPLE: SOMEWHAT DIFFICULT
SUM OF ALL RESPONSES TO PHQ9 QUESTIONS 1 AND 2: 3
3. TROUBLE FALLING OR STAYING ASLEEP OR SLEEPING TOO MUCH: NOT AT ALL

## 2025-05-15 ASSESSMENT — ENCOUNTER SYMPTOMS
OCCASIONAL FEELINGS OF UNSTEADINESS: 0
DEPRESSION: 0
LOSS OF SENSATION IN FEET: 0

## 2025-05-15 ASSESSMENT — GERIATRIC MINI NUTRITIONAL ASSESSMENT (MNA)
D HAS SUFFERED PSYCHOLOGICAL STRESS OR ACUTE DISEASE IN THE PAST 3 MONTHS?: NO
C GENERAL MOBILITY: GOES OUT
E NEUROPSYCHOLOGICAL PROBLEMS: NO PSYCHOLOGICAL PROBLEMS
A HAS FOOD INTAKE DECLINED OVER THE PAST 3 MONTHS DUE TO LOSS OF APPETITE, DIGESTIVE PROBLEMS, CHEWING OR SWALLOWING DIFFICULTIES?: NO DECREASE IN FOOD INTAKE
B WEIGHT LOSS DURING THE LAST 3 MONTHS: NO WEIGHT LOSS

## 2025-05-15 ASSESSMENT — PAIN SCALES - GENERAL: PAINLEVEL_OUTOF10: 8

## 2025-05-15 NOTE — PROGRESS NOTES
Physical Therapy  Physical Therapy Treatment Note    Patient Name: Sue Carter  MRN: [id number]  Today's Date: 5/15/2025  Time Calculation  Start Time: 1045  Stop Time: 1125  Time Calculation (min): 40 min  PT Therapeutic Procedures Time Entry  Manual Therapy Time Entry: 15  Therapeutic Exercise Time Entry: 23        Insurance:  Visit number: 14 of [place]  Authorization info: No auth   Insurance Type: Payor: MEDICARE / Plan: MEDICARE PART A AND B / Product Type: *No Product type* /   Current Problem  1. Hip stiffness, right  Follow Up In Physical Therapy      2. Difficulty walking  Follow Up In Physical Therapy      3. Right hip pain  Follow Up In Physical Therapy      4. Closed displaced fracture of right acetabulum, unspecified portion of acetabulum, initial encounter (Multi)  Follow Up In Physical Therapy        General   General  Reason for Referral: R acetabulum fx (post op)  Referred By: Keyana Garcia PA-C  General Comment: DOS: 12/11/24  Precautions:  Precautions  Precautions Comment: Osteoporosis  Precautions    50% WB through right hip, until March 12th  After March 12th able to progress WBAT  12 weeks + no more ROM restrictions for flexion   Subjective:     Pt has continued to have increased pain over her R hip bursa. She has been having tenderness to the touch and is not able to lay on that side.    Pain     Objective:   Palpation/Observation   No tenderness to palpation of surrounding R hip musculature.      Hip ROM   Flexion- R: 90 L: 110  Abduction- R: 20 L: 35  ER- R:20 L: 30  IR- R: 15 L: 20     Hip MMT  Flexion- R: 4/5  L: 4+/5  Abduction- R: 4/5 L: 4+/5  Adduction - R: 4/5 L: 4+/5  Extension- R: 4/5 L: 4+/5    6 min walk test   478 ft with walker and no rest breaks      Outcome Measures:  Other Measures  Lower Extremity Funtional Score (LEFS): 40/80  Treatments:     THERE EX    -7 min bike for ROM  - Supine isometric clamshell 5 x 30 sec on/off   - Supine adduction 3 x 10   - Pt education on only  performing isometrics until lateral hip pain improves     MANUAL   Trigger point release to lateral glute           Assessment:  Pt tolerated session well.  Pt tolerated trigger point release to lateral glute very well without any increase in hip pain. Pt was educated to only perform hip abduction and adduction isometrics until the pain she is having in her lateral hip improves. Pt continues to progress towards goals established in the POC and should continue with skilled therapy.         Plan: Continue to progress R hip ROM and strengthening.  Resume active strengthening as able.     Goals:  Active       PT Problem       PT Goals       Start:  03/03/25       1. Pt will increase LEFS with 65/80 or better in order to demo an increase in R hip function  2. Pt will increase R hip ROM to 0-110 in order to demo an increase in knee functional mobility   3. Pt will demo 4+/5 or all hip/knee MMT in order to demo an increase in LE strength   4. Pt will be able to ascend and descend 10 steps with reciprocal gait pattern and proper knee control in order to demo and increase in functional mobility   5. Pt will demo compliance and independence with HEP   6.  Pt will be able to walk for 10 mins with step through gait pattern and non antalgic gait pattern with no AD

## 2025-05-15 NOTE — PROGRESS NOTES
Subjective   Reason for Visit: Sue Carter is an 74 y.o. female here for a Medicare Wellness visit.   Reviewed all medications by prescribing practitioner or clinical pharmacist (such as prescriptions, OTCs, herbal therapies and supplements) and documented in the medical record.    HPI    Patient Care Team:  Yuval Gonzalez MD as PCP - General (Internal Medicine)  Yuval Gonzalez MD as PCP - [facility]     Review of Systems    Objective   Vitals:  Temp 36.1 °C (97 °F) (Temporal)       Physical Exam    Assessment & Plan  Routine general medical examination at health care facility    Orders:    1 Year Follow Up In Primary Care - Wellness Exam; Future

## 2025-05-20 ENCOUNTER — TREATMENT (OUTPATIENT)
Dept: PHYSICAL THERAPY | Facility: CLINIC | Age: 75
End: 2025-05-20
Payer: MEDICARE

## 2025-05-20 DIAGNOSIS — R26.2 DIFFICULTY WALKING: ICD-10-CM

## 2025-05-20 DIAGNOSIS — M25.651 HIP STIFFNESS, RIGHT: ICD-10-CM

## 2025-05-20 DIAGNOSIS — S32.401A CLOSED DISPLACED FRACTURE OF RIGHT ACETABULUM, UNSPECIFIED PORTION OF ACETABULUM, INITIAL ENCOUNTER (MULTI): ICD-10-CM

## 2025-05-20 DIAGNOSIS — M25.551 RIGHT HIP PAIN: ICD-10-CM

## 2025-05-20 PROCEDURE — 97032 APPL MODALITY 1+ESTIM EA 15: CPT | Mod: GP

## 2025-05-20 PROCEDURE — 97110 THERAPEUTIC EXERCISES: CPT | Mod: GP

## 2025-05-20 NOTE — PROGRESS NOTES
Physical Therapy  Physical Therapy Treatment Note    Patient Name: Sue Carter  MRN: [id number]  Today's Date: 5/20/2025  Time Calculation  Start Time: 0945  Stop Time: 1030  Time Calculation (min): 45 min  PT Therapeutic Procedures Time Entry  Therapeutic Exercise Time Entry: 28  PT Modalities Time Entry  E-Stim (Attended) Time Entry: 10     Insurance:  Visit number: 15 of [place]  Authorization info: No auth   Insurance Type: Payor: MEDICARE / Plan: MEDICARE PART A AND B / Product Type: *No Product type* /   Current Problem  1. Hip stiffness, right  Follow Up In Physical Therapy      2. Difficulty walking  Follow Up In Physical Therapy      3. Right hip pain  Follow Up In Physical Therapy      4. Closed displaced fracture of right acetabulum, unspecified portion of acetabulum, initial encounter (Multi)  Follow Up In Physical Therapy        General  Reason for Referral: R acetabulum fx (post op)  Referred By: Keyana Garica PA-C  General Comment: DOS: 12/11/24General  Reason for Referral: R acetabulum fx (post op)  Referred By: Keyana Garcia PA-C  General Comment: DOS: 12/11/24  Precautions:  Precautions  Precautions Comment: Osteoporosis  Precautions  Precautions  Precautions Comment: Osteoporosis 50% WB through right hip, until March 12th  After March 12th able to progress WBAT  12 weeks + no more ROM restrictions for flexion   Subjective:     Pt continues to have bursitis pain in her lateral R hip. It continues to come and go but when pressure is put on it there is an increase in pain.     Pain     Objective:   Palpation/Observation   No tenderness to palpation of surrounding R hip musculature.      Hip ROM   Flexion- R: 90 L: 110  Abduction- R: 20 L: 35  ER- R:20 L: 30  IR- R: 15 L: 20     Hip MMT  Flexion- R: 4/5  L: 4+/5  Abduction- R: 4/5 L: 4+/5  Adduction - R: 4/5 L: 4+/5  Extension- R: 4/5 L: 4+/5    6 min walk test   478 ft with walker and no rest breaks      Outcome Measures:  Other  Measures  Lower Extremity Funtional Score (LEFS): 40/80  Treatments:     THERE EX    -7 min bike for ROM  - Supine isometric clamshell 5 x 30 sec on/off   - Seated LAQ (4#) 2 x 10   - Pt education on HEP    MANUAL       Attended estim   FDN performed to lateral glute surrounding bursa with .[id number] needles x 10 mins with stim           Assessment:  Pt tolerated session well.  Pt continued to get some relief with FDN with attended estim. She was able to continue to work on isometrics and quad strengthening without aggravating the hip. Pt continues to progress towards goals established in the POC and should continue with skilled therapy.         Plan: Continue to progress R hip ROM and strengthening.  Resume active strengthening as able.     Goals:  Active       PT Problem       PT Goals       Start:  03/03/25       1. Pt will increase LEFS with 65/80 or better in order to demo an increase in R hip function  2. Pt will increase R hip ROM to 0-110 in order to demo an increase in knee functional mobility   3. Pt will demo 4+/5 or all hip/knee MMT in order to demo an increase in LE strength   4. Pt will be able to ascend and descend 10 steps with reciprocal gait pattern and proper knee control in order to demo and increase in functional mobility   5. Pt will demo compliance and independence with HEP   6.  Pt will be able to walk for 10 mins with step through gait pattern and non antalgic gait pattern with no AD

## 2025-05-21 ENCOUNTER — APPOINTMENT (OUTPATIENT)
Dept: PHYSICAL THERAPY | Facility: CLINIC | Age: 75
End: 2025-05-21
Payer: MEDICARE

## 2025-05-21 DIAGNOSIS — R26.2 DIFFICULTY WALKING: ICD-10-CM

## 2025-05-21 DIAGNOSIS — M25.551 RIGHT HIP PAIN: ICD-10-CM

## 2025-05-21 DIAGNOSIS — M25.651 HIP STIFFNESS, RIGHT: ICD-10-CM

## 2025-05-22 ENCOUNTER — APPOINTMENT (OUTPATIENT)
Dept: PHYSICAL THERAPY | Facility: CLINIC | Age: 75
End: 2025-05-22
Payer: MEDICARE

## 2025-05-22 DIAGNOSIS — M25.651 HIP STIFFNESS, RIGHT: ICD-10-CM

## 2025-05-22 DIAGNOSIS — M25.551 RIGHT HIP PAIN: ICD-10-CM

## 2025-05-22 DIAGNOSIS — R26.2 DIFFICULTY WALKING: ICD-10-CM

## 2025-05-27 ENCOUNTER — TREATMENT (OUTPATIENT)
Dept: PHYSICAL THERAPY | Facility: CLINIC | Age: 75
End: 2025-05-27
Payer: MEDICARE

## 2025-05-27 DIAGNOSIS — R26.2 DIFFICULTY WALKING: ICD-10-CM

## 2025-05-27 DIAGNOSIS — S32.401A CLOSED DISPLACED FRACTURE OF RIGHT ACETABULUM, UNSPECIFIED PORTION OF ACETABULUM, INITIAL ENCOUNTER (MULTI): ICD-10-CM

## 2025-05-27 DIAGNOSIS — M25.551 RIGHT HIP PAIN: ICD-10-CM

## 2025-05-27 DIAGNOSIS — M25.651 HIP STIFFNESS, RIGHT: ICD-10-CM

## 2025-05-27 PROCEDURE — 97032 APPL MODALITY 1+ESTIM EA 15: CPT | Mod: GP

## 2025-05-27 PROCEDURE — 97110 THERAPEUTIC EXERCISES: CPT | Mod: GP

## 2025-05-27 NOTE — PROGRESS NOTES
Physical Therapy  Physical Therapy Treatment Note    Patient Name: Sue Carter  MRN: [id number]  Today's Date: 5/27/2025  Time Calculation  Start Time: 1345  Stop Time: 1430  Time Calculation (min): 45 min  PT Therapeutic Procedures Time Entry  Therapeutic Exercise Time Entry: 28  PT Modalities Time Entry  E-Stim (Attended) Time Entry: 10     Insurance:  Visit [place]: [place]  Authorization info: No auth   Insurance Type: Payor: MEDICARE / Plan: MEDICARE PART A AND B / Product Type: *No Product type* /   Current Problem  1. Hip stiffness, right  Follow Up In Physical Therapy      2. Difficulty walking  Follow Up In Physical Therapy      3. Right hip pain  Follow Up In Physical Therapy      4. Closed displaced fracture of right acetabulum, unspecified portion of acetabulum, initial encounter (Multi)  Follow Up In Physical Therapy        General   General  Reason for Referral: R acetabulum fx (post op)  Referred By: Keyana Garcia PA-C  General Comment: DOS: 12/11/24  Precautions:  Precautions  Precautions Comment: Osteoporosis  Precautions    50% WB through right hip, until March 12th  After March 12th able to progress WBAT  12 weeks + no more ROM restrictions for flexion   Subjective:     Pt feels like she had a few days after last visit with less bursitis pain but it has come back. She continues to feel it most when walking     Pain     Objective:   Palpation/Observation   No tenderness to palpation of surrounding R hip musculature.      Hip ROM   Flexion- R: 90 L: 110  Abduction- R: 20 L: 35  ER- R:20 L: 30  IR- R: 15 L: 20     Hip MMT  Flexion- R: 4/5  L: 4+/5  Abduction- R: 4/5 L: 4+/5  Adduction - R: 4/5 L: 4+/5  Extension- R: 4/5 L: 4+/5    6 min walk test   478 ft with walker and no rest breaks      Outcome Measures:  Other Measures  Lower Extremity Funtional Score (LEFS): 40/80  Treatments:     THERE EX    -7 min bike for ROM  - Supine isometric clamshell (L LE pushing out only) 3 x 10   - Seated  piriformis stretch 2 x 30 sec       MANUAL       Attended estim   FDN performed to lateral glute surrounding bursa with .[id number] needles x 10 mins with stim           Assessment:  Pt tolerated session well.  Pt continued to get some releif from FDN. She was able to progress to banded isometric resistance for gluteal strengthening without any increase in pain. Pt continues to progress towards goals established in the POC and should continue with skilled therapy.         Plan: Continue to progress R hip ROM and strengthening.  Resume active strengthening as able.     Goals:  Active       PT Problem       PT Goals       Start:  03/03/25       1. Pt will increase LEFS with 65/80 or better in order to demo an increase in R hip function  2. Pt will increase R hip ROM to 0-110 in order to demo an increase in knee functional mobility   3. Pt will demo 4+/5 or all hip/knee MMT in order to demo an increase in LE strength   4. Pt will be able to ascend and descend 10 steps with reciprocal gait pattern and proper knee control in order to demo and increase in functional mobility   5. Pt will demo compliance and independence with HEP   6.  Pt will be able to walk for 10 mins with step through gait pattern and non antalgic gait pattern with no AD

## 2025-05-29 ENCOUNTER — TREATMENT (OUTPATIENT)
Dept: PHYSICAL THERAPY | Facility: CLINIC | Age: 75
End: 2025-05-29
Payer: MEDICARE

## 2025-05-29 DIAGNOSIS — M25.551 RIGHT HIP PAIN: ICD-10-CM

## 2025-05-29 DIAGNOSIS — M25.651 HIP STIFFNESS, RIGHT: ICD-10-CM

## 2025-05-29 DIAGNOSIS — S32.401A CLOSED DISPLACED FRACTURE OF RIGHT ACETABULUM, UNSPECIFIED PORTION OF ACETABULUM, INITIAL ENCOUNTER (MULTI): ICD-10-CM

## 2025-05-29 DIAGNOSIS — R26.2 DIFFICULTY WALKING: ICD-10-CM

## 2025-05-29 PROCEDURE — 97110 THERAPEUTIC EXERCISES: CPT | Mod: GP

## 2025-05-29 PROCEDURE — 97140 MANUAL THERAPY 1/> REGIONS: CPT | Mod: GP

## 2025-05-29 NOTE — PROGRESS NOTES
Physical Therapy  Physical Therapy Treatment Note    Patient Name: Sue Carter  MRN: [id number]  Today's Date: 5/29/2025  Time Calculation  Start Time: 1030  Stop Time: 1115  Time Calculation (min): 45 min  PT Therapeutic Procedures Time Entry  Manual Therapy Time Entry: 25  Therapeutic Exercise Time Entry: 15        Insurance:  Visit number: 17 [place]  Authorization info: No auth   Insurance Type: Payor: MEDICARE / Plan: MEDICARE PART A AND B / Product Type: *No Product type* /   Current Problem  1. Hip stiffness, right  Follow Up In Physical Therapy      2. Difficulty walking  Follow Up In Physical Therapy      3. Right hip pain  Follow Up In Physical Therapy      4. Closed displaced fracture of right acetabulum, unspecified portion of acetabulum, initial encounter (Multi)  Follow Up In Physical Therapy        General   General  Reason for Referral: R acetabulum fx (post op)  Referred By: Keyana Garcia PA-C  General Comment: DOS: 12/11/24  Precautions:  Precautions  Precautions Comment: Osteoporosis  Precautions    50% WB through right hip, until March 12th  After March 12th able to progress WBAT  12 weeks + no more ROM restrictions for flexion   Subjective:     Pt feels like she had a few days after last visit with less bursitis pain but it has come back. She continues to feel it most when walking     Pt reports have not noticed that much difference after the last FDN    Pain     Objective:   Palpation/Observation   No tenderness to palpation of surrounding R hip musculature.      Hip ROM   Flexion- R: 90 L: 110  Abduction- R: 20 L: 35  ER- R:20 L: 30  IR- R: 15 L: 20     Hip MMT  Flexion- R: 4/5  L: 4+/5  Abduction- R: 4/5 L: 4+/5  Adduction - R: 4/5 L: 4+/5  Extension- R: 4/5 L: 4+/5    6 min walk test   478 ft with walker and no rest breaks      Outcome Measures:  Other Measures  Lower Extremity Funtional Score (LEFS): 40/80  Treatments:     THERE EX    -6 min bike for ROM  - LAQ x 2 lbs x 15 reps right  and left side  - standing weight shift to the right side and hold 10 secs x 10 reps   - standing weight shift on top of the airex x 3 min   -PROM of the right hip  TE x 1    MANUAL   - STM over the anterior thigh, adductor, lateral thigh focus on the anterior quad,   - K tape star burst over the greater trochanter ( hands on 25 min)  - ( ice massage x 3 min over the greater trochanter)   Manual x 2  Attended estim ( not today)  FDN performed to lateral glute surrounding bursa with .[id number] needles x 10 mins with stim           Assessment:  Pt tolerated session well.  Pt reports good tolerance of the treatment today.  Pt going to increase her ice massage over the right hip 2 x a day for the next 4 days and use the can to help decrease overall irritability in the right hip/leg. Pt continues to progress towards goals established in the POC and should continue with skilled therapy.         Plan: Continue to progress R hip ROM and strengthening.  Resume active strengthening as able.     Goals:  Active       PT Problem       PT Goals       Start:  03/03/25       1. Pt will increase LEFS with 65/80 or better in order to demo an increase in R hip function  2. Pt will increase R hip ROM to 0-110 in order to demo an increase in knee functional mobility   3. Pt will demo 4+/5 or all hip/knee MMT in order to demo an increase in LE strength   4. Pt will be able to ascend and descend 10 steps with reciprocal gait pattern and proper knee control in order to demo and increase in functional mobility   5. Pt will demo compliance and independence with HEP   6.  Pt will be able to walk for 10 mins with step through gait pattern and non antalgic gait pattern with no AD

## 2025-06-03 ENCOUNTER — TREATMENT (OUTPATIENT)
Dept: PHYSICAL THERAPY | Facility: CLINIC | Age: 75
End: 2025-06-03
Payer: MEDICARE

## 2025-06-03 DIAGNOSIS — S32.401A CLOSED DISPLACED FRACTURE OF RIGHT ACETABULUM, UNSPECIFIED PORTION OF ACETABULUM, INITIAL ENCOUNTER (MULTI): ICD-10-CM

## 2025-06-03 DIAGNOSIS — R26.2 DIFFICULTY WALKING: ICD-10-CM

## 2025-06-03 DIAGNOSIS — M25.551 RIGHT HIP PAIN: ICD-10-CM

## 2025-06-03 DIAGNOSIS — M25.651 HIP STIFFNESS, RIGHT: ICD-10-CM

## 2025-06-03 PROCEDURE — 97112 NEUROMUSCULAR REEDUCATION: CPT | Mod: GP

## 2025-06-03 PROCEDURE — 97110 THERAPEUTIC EXERCISES: CPT | Mod: GP

## 2025-06-03 NOTE — PROGRESS NOTES
Physical Therapy  Physical Therapy Treatment Note    Patient Name: Sue Carter  MRN: [id number]  Today's Date: 6/3/2025  Time Calculation  Start Time: 1430  Stop Time: 1515  Time Calculation (min): 45 min  PT Therapeutic Procedures Time Entry  Neuromuscular Re-Education Time Entry: 20  Therapeutic Exercise Time Entry: 23        Insurance:  Visit number: 17 [place]  Authorization info: No auth   Insurance Type: Payor: MEDICARE / Plan: MEDICARE PART A AND B / Product Type: *No Product type* /   Current Problem  1. Hip stiffness, right  Follow Up In Physical Therapy      2. Difficulty walking  Follow Up In Physical Therapy      3. Right hip pain  Follow Up In Physical Therapy      4. Closed displaced fracture of right acetabulum, unspecified portion of acetabulum, initial encounter (Multi)  Follow Up In Physical Therapy        General   General  Reason for Referral: R acetabulum fx (post op)  Referred By: Keyana Garcia PA-C  General Comment: DOS: 12/11/24  Precautions:  Precautions  Precautions Comment: Osteoporosis  Precautions    50% WB through right hip, until March 12th  After March 12th able to progress WBAT  12 weeks + no more ROM restrictions for flexion   Subjective:   Pt reports  that walking on grass on the course was the most difficult     Pain     Objective:   Palpation/Observation   No tenderness to palpation of surrounding R hip musculature.      Hip ROM   Flexion- R: 90 L: 110  Abduction- R: 20 L: 35  ER- R:20 L: 30  IR- R: 15 L: 20     Hip MMT  Flexion- R: 4/5  L: 4+/5  Abduction- R: 4/5 L: 4+/5  Adduction - R: 4/5 L: 4+/5  Extension- R: 4/5 L: 4+/5    6 min walk test   478 ft with walker and no rest breaks      Outcome Measures:  Other Measures  Lower Extremity Funtional Score (LEFS): 40/80  Treatments:     THERE EX    -6 min bike for ROM  - walk on mat with SPC and without SPC  - walk around the clinic no cane  - 4 point stepping in squares CW and CCW   - lateral side stepping back and forth on  the mat x multiple times   - diagonal stepping right and left side   - multi color steps ( color and height ) call out, single, triple call out, forward and backward recalls   - seated hip abduction with RTB x 15 reps   - seated hip adduction x 15 reps   - balze pods 4 pods reaction timing with distraction 3 sets, best set 20 reps   - review of balance program, walking program,   - LAQ x 2 lbs x 15 reps right and left side  - standing weight shift to the right side and hold 10 secs x 10 reps   - standing weight shift on top of the airex x 3 min   -PROM of the right hip  TE x 2  NM x 1     MANUAL ( not today)  - STM over the anterior thigh, adductor, lateral thigh focus on the anterior quad,   - K tape star burst over the greater trochanter ( hands on 25 min)  - ( ice massage x 3 min over the greater trochanter)     Attended estim ( not today)  FDN performed to lateral glute surrounding bursa with .[id number] needles x 10 mins with stim           Assessment:  Pt tolerated session well.  Pt reports good tolerance of the treatment today.  Pt was fatigued by the end of the session but no new pain.  Needed only contact guard for the walking exercises/balance programPt going to increase her ice massage over the right hip 2 x a day for the next 4 days and use the can to help decrease overall irritability in the right hip/leg. Pt continues to progress towards goals established in the POC and should continue with skilled therapy.         Plan: Continue to progress R hip ROM and strengthening.  Resume active strengthening as able.     Goals:  Active       PT Problem       PT Goals       Start:  03/03/25       1. Pt will increase LEFS with 65/80 or better in order to demo an increase in R hip function  2. Pt will increase R hip ROM to 0-110 in order to demo an increase in knee functional mobility   3. Pt will demo 4+/5 or all hip/knee MMT in order to demo an increase in LE strength   4. Pt will be able to ascend and descend 10  steps with reciprocal gait pattern and proper knee control in order to demo and increase in functional mobility   5. Pt will demo compliance and independence with HEP   6.  Pt will be able to walk for 10 mins with step through gait pattern and non antalgic gait pattern with no AD

## 2025-06-10 ENCOUNTER — TREATMENT (OUTPATIENT)
Dept: PHYSICAL THERAPY | Facility: CLINIC | Age: 75
End: 2025-06-10
Payer: MEDICARE

## 2025-06-10 DIAGNOSIS — M25.651 HIP STIFFNESS, RIGHT: ICD-10-CM

## 2025-06-10 DIAGNOSIS — S32.401A CLOSED DISPLACED FRACTURE OF RIGHT ACETABULUM, UNSPECIFIED PORTION OF ACETABULUM, INITIAL ENCOUNTER (MULTI): ICD-10-CM

## 2025-06-10 DIAGNOSIS — M25.551 RIGHT HIP PAIN: ICD-10-CM

## 2025-06-10 DIAGNOSIS — R26.2 DIFFICULTY WALKING: ICD-10-CM

## 2025-06-10 PROCEDURE — 97112 NEUROMUSCULAR REEDUCATION: CPT | Mod: GP

## 2025-06-10 PROCEDURE — 97110 THERAPEUTIC EXERCISES: CPT | Mod: GP

## 2025-06-10 NOTE — PROGRESS NOTES
Physical Therapy  Physical Therapy Treatment Note    Patient Name: Sue Carter  MRN: [id number]  Today's Date: 6/10/2025              Insurance:  Visit number: 18 of [place]  Authorization info: No auth   Insurance Type: Payor: MEDICARE / Plan: MEDICARE PART A AND B / Product Type: *No Product type* /   Current Problem  1. Hip stiffness, right  Follow Up In Physical Therapy      2. Difficulty walking  Follow Up In Physical Therapy      3. Right hip pain  Follow Up In Physical Therapy      4. Closed displaced fracture of right acetabulum, unspecified portion of acetabulum, initial encounter (Multi)  Follow Up In Physical Therapy        General   General  Reason for Referral: R acetabulum fx (post op)  Referred By: Keyana Garcia PA-C  General Comment: DOS: 12/11/24  Precautions:  Precautions  Precautions Comment: Osteoporosis  Precautions    50% WB through right hip, until March 12th  After March 12th able to progress WBAT  12 weeks + no more ROM restrictions for flexion   Subjective:   Pt reports that she felt ok after the last session.  No new pain. A little more discomfort in the front of the thigh    Pain     Objective:   Palpation/Observation   No tenderness to palpation of surrounding R hip musculature.      Hip ROM   Flexion- R: 90 L: 110  Abduction- R: 20 L: 35  ER- R:20 L: 30  IR- R: 15 L: 20     Hip MMT  Flexion- R: 4/5  L: 4+/5  Abduction- R: 4/5 L: 4+/5  Adduction - R: 4/5 L: 4+/5  Extension- R: 4/5 L: 4+/5    6 min walk test   478 ft with walker and no rest breaks      Outcome Measures:  Other Measures  Lower Extremity Funtional Score (LEFS): 40/80  Treatments:     THERE EX    -6 min bike for ROM  - seated LAQ x 3 lbs x 15 reps right and left  - seated march 3lbs x 15 reps each side on airex pad  - seated hip abduction with band x 15 reps  - walk around the clinic without a device and forward, backward, lap around the clinic  - cone walk through, forward and backward, side to side step walk through,  diagonal forward and backward, cone   - seated hip adduction x 3 secs x 12 reps   - step up with the 3 in step x 15 reps  - review of HEP and unloaded program ( aquatics)  - discussion regarding hardware      TE x 2  NM x 1     MANUAL ( not today)  - STM over the anterior thigh, adductor, lateral thigh focus on the anterior quad,   - K tape star burst over the greater trochanter ( hands on 25 min)  - ( ice massage x 3 min over the greater trochanter)     Attended estim ( not today)  FDN performed to lateral glute surrounding bursa with .[id number] needles x 10 mins with stim           Assessment:  Pt tolerated session well. Pt reports no increase in reactivity with the program, but continues to have moderate irritability in the front hip.  Pt reports good tolerance of the treatment today.  Pt was fatigued by the end of the session but no new pain.   Pt continues to progress towards goals established in the POC and should continue with skilled therapy.         Plan: Continue to progress R hip ROM and strengthening.  Resume active strengthening as able.     Goals:  Active       PT Problem       PT Goals       Start:  03/03/25       1. Pt will increase LEFS with 65/80 or better in order to demo an increase in R hip function  2. Pt will increase R hip ROM to 0-110 in order to demo an increase in knee functional mobility   3. Pt will demo 4+/5 or all hip/knee MMT in order to demo an increase in LE strength   4. Pt will be able to ascend and descend 10 steps with reciprocal gait pattern and proper knee control in order to demo and increase in functional mobility   5. Pt will demo compliance and independence with HEP   6.  Pt will be able to walk for 10 mins with step through gait pattern and non antalgic gait pattern with no AD

## 2025-06-16 ASSESSMENT — RHEUMATOLOGY NEW PATIENT QUESTIONNAIRE
COUGH: N
PAIN OR BURNING ON URINATION: N
SORES IN MOUTH OR NOSE: N
EASILY LOSING TEMPER: N
DIFFICULTY BREATHING LYING DOWN: N
MEMORY LOSS: N
BEHAVIORAL CHANGES: N
EYE REDNESS: N
SKIN REDNESS: N
JAUNDICE: N
SWOLLEN OR TENDER GLANDS: N
JOINT SWELLING: N
DEPRESSION: N
FAINTING: N
HEARTBURN OR REFLUX: N
DRYNESS OF MOUTH: N
HOARSE VOICE: N
DIFFICULTY SWALLOWING: N
BLOOD IN STOOLS: N
EYE PAIN: N
VAGINAL DRYNESS: N
MORNING STIFFNESS: N
COLOR CHANGES OF HANDS OR FEET IN THE COLD: N
DIFFICULTY FALLING ASLEEP: N
UNUSUALLY RAPID OR SLOWED HEART RATE: N
SKIN TIGHTNESS: N
EYE DRYNESS: Y
VOMITING OF BLOOD OR COFFEE GROUND CONSISTENCY MATERIAL: N
LOSS OF CONSCIOUSNESS: N
RASH: N
STOMACH PAIN: N
UNEXPLAINED WEIGHT CHANGE: N
ANXIETY: N
RASH OR ULCERS: N
ANEMIA: N
UNUSUAL FATIGUE: N
EXCESSIVE HAIR LOSS (MORE THAN YOUR NORM): Y
DOUBLE OR BLURRED VISION: N
SHORTNESS OF BREATH: N
JOINT PAIN: Y
CHEST PAIN: N
UNEXPLAINED HEARING LOSS: N
UNUSUAL BLEEDING: N
NUMBNESS OR TINGLING IN HANDS OR FEET: Y
INCREASED SUSCEPTIBILITY TO INFECTION: N
HEADACHES: N
PERSISTENT DIARRHEA: N
NODULES/BUMPS: N
LOSS OF VISION: N
DIFFICULTY STAYING ASLEEP: N
SEIZURES: N
MUSCLE WEAKNESS: Y
AGITATION: N
NIGHT SWEATS: N
NAUSEA: N
ABNORMAL URINE: N
EASY BRUISING: N
BLACK STOOLS: N
SUN SENSITIVE (SUN ALLERGY): N
FEVER: N
SWOLLEN LEGS OR FEET: N

## 2025-06-17 ENCOUNTER — TREATMENT (OUTPATIENT)
Dept: PHYSICAL THERAPY | Facility: CLINIC | Age: 75
End: 2025-06-17
Payer: MEDICARE

## 2025-06-17 DIAGNOSIS — M25.551 RIGHT HIP PAIN: ICD-10-CM

## 2025-06-17 DIAGNOSIS — R26.2 DIFFICULTY WALKING: ICD-10-CM

## 2025-06-17 DIAGNOSIS — M25.651 HIP STIFFNESS, RIGHT: ICD-10-CM

## 2025-06-17 PROCEDURE — 97014 ELECTRIC STIMULATION THERAPY: CPT | Mod: GP

## 2025-06-17 PROCEDURE — 97110 THERAPEUTIC EXERCISES: CPT | Mod: GP

## 2025-06-17 NOTE — PROGRESS NOTES
Physical Therapy  Physical Therapy Treatment Note    Patient Name: Sue Carter  MRN: [id number]  Today's Date: 6/17/2025  Time Calculation  Start Time: 0933  Stop Time: 1020  Time Calculation (min): 47 min  PT Therapeutic Procedures Time Entry  Therapeutic Exercise Time Entry: 35  PT Modalities Time Entry  E-Stim (Unattended) Time Entry: 10     Insurance:  Visit [place]: [place]  Authorization info: No auth   Insurance Type: Payor: MEDICARE / Plan: MEDICARE PART A AND B / Product Type: *No Product type* /   Current Problem  1. Hip stiffness, right        2. Difficulty walking        3. Right hip pain          General   General  Reason for Referral: R acetabulum fx (post op)  Referred By: Keyana Garcia PA-C  General Comment: DOS: 12/11/24  Precautions:  Precautions  Precautions Comment: Osteoporosis  Precautions    50% WB through right hip, until March 12th  After March 12th able to progress WBAT  12 weeks + no more ROM restrictions for flexion   Subjective:   Pt reports that she felt ok after the last session.  Pt was able to play 6 holes of golf, got tired after the 6.    Pain     Objective:   Palpation/Observation   No tenderness to palpation of surrounding R hip musculature.      Hip ROM   Flexion- R: 90 L: 110  Abduction- R: 20 L: 35  ER- R:20 L: 30  IR- R: 15 L: 20     Hip MMT  Flexion- R: 4/5  L: 4+/5  Abduction- R: 4/5 L: 4+/5  Adduction - R: 4/5 L: 4+/5  Extension- R: 4/5 L: 4+/5    6 min walk test   478 ft with walker and no rest breaks      Outcome Measures:  Other Measures  Lower Extremity Funtional Score (LEFS): 40/80  Treatments:     THERE EX    -6 min bike for ROM  - SAQ x 2.5 lbs x 12 reps right and left side  - mini glute lifts x 12 reps  - mini hip marches x 15 reps right and left side   - supine hip adduction isometric 6 secs x 12 reps  - seated march 3lbs x 15 reps each side on airex pad  - seated hip abduction with band x 15 reps  - review of joint guidelines, soreness rule, aquatic  review  -    TENS unit education: placement, when to use, on right out hip for 10 min      - walk around the clinic without a device and forward, backward, lap around the clinic  - cone walk through, forward and backward, side to side step walk through, diagonal forward and backward, cone   - seated hip adduction x 3 secs x 12 reps   - step up with the 3 in step x 15 reps  - review of HEP and unloaded program ( aquatics)  - discussion regarding hardware      TE x 2  Un attended E stim x 1    MANUAL ( not today)  - STM over the anterior thigh, adductor, lateral thigh focus on the anterior quad,   - K tape star burst over the greater trochanter ( hands on 25 min)  - ( ice massage x 3 min over the greater trochanter)     Attended estim ( not today)  FDN performed to lateral glute surrounding bursa with .[id number] needles x 10 mins with stim           Assessment:  Pt tolerated session well. Pt felt good with the TENS unit.  Educated  on pain science and the use of TENs unit. Pt feels comfortable with HEP and reports feeling comfortable with her current program. Pt continues to have moderate irritability in the front hip. Pt her next follow up with primary PT to discuss PT POC and potential discharge.    Pt continues to progress towards goals established in the POC and should continue with skilled therapy.         Plan: Continue to progress R hip ROM and strengthening.  Resume active strengthening as able.     Goals:  Active       PT Problem       PT Goals       Start:  03/03/25       1. Pt will increase LEFS with 65/80 or better in order to demo an increase in R hip function  2. Pt will increase R hip ROM to 0-110 in order to demo an increase in knee functional mobility   3. Pt will demo 4+/5 or all hip/knee MMT in order to demo an increase in LE strength   4. Pt will be able to ascend and descend 10 steps with reciprocal gait pattern and proper knee control in order to demo and increase in functional mobility   5.  Pt will demo compliance and independence with HEP   6.  Pt will be able to walk for 10 mins with step through gait pattern and non antalgic gait pattern with no AD

## 2025-06-18 ENCOUNTER — APPOINTMENT (OUTPATIENT)
Dept: RHEUMATOLOGY | Facility: CLINIC | Age: 75
End: 2025-06-18
Payer: MEDICARE

## 2025-06-18 ENCOUNTER — TELEPHONE (OUTPATIENT)
Dept: RHEUMATOLOGY | Facility: CLINIC | Age: 75
End: 2025-06-18

## 2025-06-18 VITALS
OXYGEN SATURATION: 96 % | BODY MASS INDEX: 24.11 KG/M2 | SYSTOLIC BLOOD PRESSURE: 120 MMHG | WEIGHT: 150 LBS | HEIGHT: 66 IN | DIASTOLIC BLOOD PRESSURE: 70 MMHG | HEART RATE: 77 BPM

## 2025-06-18 DIAGNOSIS — M81.0 OSTEOPOROSIS, UNSPECIFIED OSTEOPOROSIS TYPE, UNSPECIFIED PATHOLOGICAL FRACTURE PRESENCE: ICD-10-CM

## 2025-06-18 PROCEDURE — 99205 OFFICE O/P NEW HI 60 MIN: CPT | Performed by: STUDENT IN AN ORGANIZED HEALTH CARE EDUCATION/TRAINING PROGRAM

## 2025-06-18 PROCEDURE — 1036F TOBACCO NON-USER: CPT | Performed by: STUDENT IN AN ORGANIZED HEALTH CARE EDUCATION/TRAINING PROGRAM

## 2025-06-18 PROCEDURE — 1159F MED LIST DOCD IN RCRD: CPT | Performed by: STUDENT IN AN ORGANIZED HEALTH CARE EDUCATION/TRAINING PROGRAM

## 2025-06-18 RX ORDER — BUTALB/ACETAMINOPHEN/CAFFEINE 50-325-40
1 TABLET ORAL DAILY
COMMUNITY

## 2025-06-18 NOTE — PROGRESS NOTES
Subjective   Patient ID: Sue Carter is a 74 y.o. female who presents for New Patient Visit (Bone density/Referred by Dr. Gonzalez).  HPI:    Female with cerebral amyloid angiopathy with related inflammation completed steroid therapy around 2021 and follows with [facility] .  Right hip pain status post intra-articular Kenalog April 2025, has had right acetabular fracture Dec 10 2024 status postrepair at [facility], here for osteoporosis  Patient previously followed at [facility] rheumatology, notes from 2021 reviewed; at time, [facility] recommended Reclast due to prior issues with Fosamax.    Patient had sacral insufficiency fractures while on chronic steroids for cerebral amyloid, she has been off of prednisone since 2021, she also had a right hip acetabular fracture    3/10.2025 dexa at  [facility]     FINDINGS:  SPINE L1-L4  Bone Mineral Density: 1.047  T-Score -1.1  Z-Score 0.7      LEFT FEMUR -TOTAL  Bone Mineral Density: 0.615  T-Score -3.1   Z-Score  -1.4  LEFT FEMUR -NECK  Bone Mineral Density: 0.767  T-Score -1.9  Z-Score 0.0      Personal or family history of fragility fracture?            foot, hand, collarbone as a baby, sacral insuffiency fractures, right hip fracture 12/2024 after fall in room, mother had OP                                                   Hx of hypothyroidism/hyperthyroidism/hyperparathyroidism/hypoparathyroidism?no  Hx of GI , renal , liver, collagen or malignancy issues?              -no           Accelerated bone loss meds ?    anti-seizure, immunosuppressants , anti- aromatase inhibitors, Thyroid replacement therapy, anticoagulants?   steroids?    Long term prednisone for 49 weeks,                       Anti-resorptive meds? Bisphosphonates, estrogen, Calcitonin- distant hx of fosamax, does not know how long, and does not remember side effect  dental hygiene?no  dental work planned?no  Dairy (Y)  Ca/VitD (Y) ETOH (Y) Tobacco (history of smoking)    Menopause? Distant   Heart attack or stroke?no  History of  "Kidney Stones?no  Secondary labs: PTH normal, Vit D normal, TSH normal  Objective   /70 (BP Location: Left arm, Patient Position: Sitting, BP Cuff Size: Adult)   Pulse 77   Ht 1.676 m (5' 6\")   Wt 68 kg (150 lb)   SpO2 96%   BMI 24.21 kg/m²       Physical Exam  Constitutional: Alert and in no acute distress. Well developed, well nourished    Lab Results   Component Value Date    WBC 8.25 05/15/2025    HGB 13.04 05/15/2025    HCT 38.0 05/15/2025    .8 05/15/2025    ALT 19 05/15/2025    AST 23 05/15/2025    CREATININE 0.74 05/15/2025          No results found for: \"ANANP\", \"ANATITERADD\", \"ANACO\", \"ANAPATTRN\", \"ANPA2\", \"FANAP\", \"ANATITER\", \"ANAT2\", \"MARIELOS\", \"CDCANA\", \"DSDN\", \"EMPSMRNP\", \"SCLN\", \"SCLABQ\", \"SCIB\", \"CTIB\", \"ALAAZT83\", \"JFRXTZ07\", \"ASSB\", \"SSBB\", \"C3\", \"C4\", \"UAMICCOMM\", \"UTPCR\", \"ANTIRIBO\", \"ACEN\", \"SEDRATE\", \"NONUHFIRE\", \"POCESR\", \"CRP\", \"RF\", \"CCPIGGQUAL\"\\            There is currently no information documented on the homunculus. Go to the Rheumatology activity and complete the homunculus joint exam.    Assessment/Plan:  #Osteoporosis with multiple fractures- foot, hand, collarbone as a baby, sacral insuffiency fractures, right hip fracture 12/2024  -Risk factors: Major one is prednisone for 49 weeks (off since 2021 though), age, postmenopausal, fam hx, hx of smoking-  -Patient counseled on osteoporosis diagnosis. Patient counseled on necessary vitamin D and calcium supplementation.  Patient counseled on importance of stability and balance.  Weightbearing exercises, such as walking, encouraged.  Patient handout given.  -Secondary workup negative: Vitamin D, TSH, PTH normal  - Would recommend Evenity versus Forteo ; patient does not feel that she can inject resultingly, so we will do Evenity.  -1 year Evenity ordered June 2025, patient counseled to get CMP and fasting C telopeptide within 30 days before first injection  - Risks of romosozumab discussed including but not limited to " arthralgias, injection site reactions, rare risk of osteonecrosis of jaw or atypical fracture.  Blackbox risk warning discussed of MI, stroke, and cardiovascular death, and discussed it should not be initiated in patients who have had an MI or stroke within the previous year  - Then would recommend transition to Prolia; can order at next visit  -Risks of denosumab discussed including but not limited to back pain, hand pain, feet  pain, , low calcium, jaw osteonecrosis, atypical femur fracture, allergies, risk of rebound bone loss if patient is not completely adherent with injection schedule. Patient understanding and aware of risks  -repeat bone densitiy due 3/10/2027 at  [facility]-can order at next visit    Patient counseled to seek medical care if any new or worsening symptoms, urgently if needed.      Note will be sent to primary care doctor.    Return to clinic in1 year    Total time on this day of visit includes record and documentation review before and after visit including documentation and time not explicitly included on EMR time stamp.     Dragon dictation software was used to dictate this note. Errors may have occurred during dictation that was not intended by the user.      [id number] based off of severe osteoporosis with life limiting and body function threatening fractures, ordering and review of greater than 3 individual tests, independent review and interpretation of bone density consistent with severe osteoporosis

## 2025-06-18 NOTE — PATIENT INSTRUCTIONS
Take atleast 500 mg calcium a day (1200 is fine if that's what you're on)    Take atleast 2000 international units of Vitamin D3 a day    I would recommend evenity for 12 months, (monthly injection) followed by Prolia lifelong (injection every 6 months)    You can see your dentist first to make sure they are not planning an extraction; if they are not, you should be good to go    We can followup yearly  (in person or telemedicine per your preference, you can let the  know when you schedule), and we will repeat a bone density every two years at [facility]

## 2025-06-19 ENCOUNTER — SPECIALTY PHARMACY (OUTPATIENT)
Dept: PHARMACY | Facility: CLINIC | Age: 75
End: 2025-06-19

## 2025-06-19 ENCOUNTER — DOCUMENTATION (OUTPATIENT)
Dept: INFUSION THERAPY | Facility: CLINIC | Age: 75
End: 2025-06-19
Payer: MEDICARE

## 2025-06-19 DIAGNOSIS — M81.0 AGE-RELATED OSTEOPOROSIS WITHOUT CURRENT PATHOLOGICAL FRACTURE: Primary | ICD-10-CM

## 2025-06-19 RX ORDER — ALBUTEROL SULFATE 0.83 MG/ML
3 SOLUTION RESPIRATORY (INHALATION) AS NEEDED
OUTPATIENT
Start: 2025-06-20

## 2025-06-19 RX ORDER — DIPHENHYDRAMINE HYDROCHLORIDE 50 MG/ML
50 INJECTION, SOLUTION INTRAMUSCULAR; INTRAVENOUS AS NEEDED
OUTPATIENT
Start: 2025-06-20

## 2025-06-19 RX ORDER — FAMOTIDINE 10 MG/ML
20 INJECTION, SOLUTION INTRAVENOUS ONCE AS NEEDED
OUTPATIENT
Start: 2025-06-20

## 2025-06-19 RX ORDER — EPINEPHRINE 0.3 MG/.3ML
0.3 INJECTION SUBCUTANEOUS EVERY 5 MIN PRN
OUTPATIENT
Start: 2025-06-20

## 2025-06-19 NOTE — PROGRESS NOTES
"CLINICAL CLEARANCE FOR OUTPATIENT INJECTION      Patient to be scheduled for New Start of Evenity injections.    For Diagnosis: Osteoporosis     Labs required prior to first treatment (please order if not ordered / completed):    Lab Results   Component Value Date    CALCIUM 9.1 05/15/2025    PHOS 2.7 12/13/2024    No results found for: \"CAION\"   Lab Results   Component Value Date    ALBUMIN 3.6 12/13/2024      (Serum or Corrected Calcium must be >8.6mg/dl. OR Ionized Calcium must be >1.1 mmol/L or >4.7 mg/dL (depending on resulting agency).  If below WNL - Contact prescribing provider. Labs should be drawn within 28 days of first treatment.)    (Hypocalcemia must be corrected prior to initiation.)    Calcium and Vitamin D on medication list: Yes  (if no nurse to encourage discussion of supplementation at visit)  Current Outpatient Medications   Medication Instructions    acetaminophen (TYLENOL) 975 mg, oral, Every 8 hours PRN    calcium citrate-vitamin D3 (Citracal+D) 315 mg-5 mcg (200 unit) tablet 1 tablet, Daily    ibuprofen 200 mg, oral, Every 6 hours scheduled    lidocaine 4 % patch 1 patch, transdermal, Daily, Remove & discard patch within 12 hours or as directed by MD.    multivitamin with minerals tablet 1 tablet, Daily    polyethylene glycol (GLYCOLAX, MIRALAX) 17 g, oral, 2 times daily    romosozumab (EVENITY) 210 mg, subcutaneous, Every 28 days    sennosides-docusate sodium (Yasmeen-Colace) 8.6-50 mg tablet 2 tablets, oral, 2 times daily        Any history of MI or stroke within the last year? No  (If YES contact prescribing provider prior to proceeding. Evenity is not recommended in pts who have had an MI or stroke within the preceding year)  Problem List[1]   Medical History[2]     No noted dental work in the past 4 weeks per chart review. Nurse to confirm at visit.    Urine Hcg test ordered prior to first injection? Not applicable (If female pt <60 years of age and with reproductive capability assure " order in place)    Start date: anytime - insurance has approved. Patient will be called to schedule the appt.    Okay to schedule for treatment as ordered by prescribing provider pending labs. Please inform patient of need to have labs drawn prior to scheduled treatment. Active order for labs in chart.        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

## 2025-06-19 NOTE — PROGRESS NOTES
Per Dr. Fulton:   Can you please order Evenity for 1 year for this patient? She would like to go to [facility]. She has severe osteoporosis based off of T-score, and multiple fragility fractures. She has failed Fosamax.       Orders sent to [facility] for auth/clearance/ scheduling.

## 2025-06-20 DIAGNOSIS — M81.0 AGE-RELATED OSTEOPOROSIS WITHOUT CURRENT PATHOLOGICAL FRACTURE: ICD-10-CM

## 2025-06-23 ENCOUNTER — TREATMENT (OUTPATIENT)
Dept: PHYSICAL THERAPY | Facility: CLINIC | Age: 75
End: 2025-06-23
Payer: MEDICARE

## 2025-06-23 DIAGNOSIS — M25.651 HIP STIFFNESS, RIGHT: ICD-10-CM

## 2025-06-23 DIAGNOSIS — R26.2 DIFFICULTY WALKING: ICD-10-CM

## 2025-06-23 DIAGNOSIS — M25.551 RIGHT HIP PAIN: Primary | ICD-10-CM

## 2025-06-23 PROCEDURE — 97110 THERAPEUTIC EXERCISES: CPT | Mod: GP

## 2025-06-23 PROCEDURE — 97140 MANUAL THERAPY 1/> REGIONS: CPT | Mod: GP

## 2025-06-23 NOTE — PROGRESS NOTES
Physical Therapy  Physical Therapy Treatment Note    Patient Name: Sue Carter  MRN: [id number]  Today's Date: 6/23/2025  Time Calculation  Start Time: 1445  Stop Time: 1530  Time Calculation (min): 45 min  PT Therapeutic Procedures Time Entry  Therapeutic Exercise Time Entry: 25  Manual Therapy Time Entry: 15        Insurance:  Visit number: 20 of [place]  Authorization info: No auth   Insurance Type: Payor: MEDICARE / Plan: MEDICARE PART A AND B / Product Type: *No Product type* /   Current Problem  1. Hip stiffness, right        2. Difficulty walking        3. Right hip pain          General   General  Reason for Referral: R acetabulum fx (post op)  Referred By: Keyana Garcia PA-C  General Comment: DOS: 12/11/24  Precautions:  Precautions  Precautions Comment: Osteoporosis  Precautions    50% WB through right hip, until March 12th  After March 12th able to progress WBAT  12 weeks + no more ROM restrictions for flexion   Subjective:   Pt reports that she has had a big increase in discomfort over the last several days. She does not feel like she has done anything different     Pain     Objective:   Palpation/Observation   No tenderness to palpation of surrounding R hip musculature.      Hip ROM   Flexion- R: 90 L: 110  Abduction- R: 20 L: 35  ER- R:20 L: 30  IR- R: 15 L: 20     Hip MMT  Flexion- R: 4/5  L: 4+/5  Abduction- R: 4/5 L: 4+/5  Adduction - R: 4/5 L: 4+/5  Extension- R: 4/5 L: 4+/5    6 min walk test   478 ft with walker and no rest breaks      Outcome Measures:  Other Measures  Lower Extremity Funtional Score (LEFS): 40/80  Treatments:     THERE EX    -6 min bike for ROM  - Supine clamshell 3 x 10 (blue)   - Adduction bridge 2 x 10   - Seated LAQ (3#) 2 x 10   - Seated hamstring curl (red) 3 x 10           - walk around the clinic without a device and forward, backward, lap around the clinic  - cone walk through, forward and backward, side to side step walk through, diagonal forward and backward, cone    - seated hip adduction x 3 secs x 12 reps   - step up with the 3 in step x 15 reps  - review of HEP and unloaded program ( aquatics)  - discussion regarding hardware        MANUAL   - STM over the anterior thigh, adductor, lateral thigh focus on the anterior quad,       Attended estim             Assessment:  Pt tolerated session well. Due to increased pain, pt's exercises were kept in sitting or supine position. Pt had no increase in pain when performing LE strengthening. Pt is going to try and see her surgeon before setting up any more PT appts. Pt continues to progress towards goals established in the POC and should continue with skilled therapy.           Plan: Continue to progress R hip ROM and strengthening.  Resume active strengthening as able.     Goals:  Active       PT Problem       PT Goals       Start:  03/03/25       1. Pt will increase LEFS with 65/80 or better in order to demo an increase in R hip function  2. Pt will increase R hip ROM to 0-110 in order to demo an increase in knee functional mobility   3. Pt will demo 4+/5 or all hip/knee MMT in order to demo an increase in LE strength   4. Pt will be able to ascend and descend 10 steps with reciprocal gait pattern and proper knee control in order to demo and increase in functional mobility   5. Pt will demo compliance and independence with HEP   6.  Pt will be able to walk for 10 mins with step through gait pattern and non antalgic gait pattern with no AD

## 2025-08-07 ENCOUNTER — APPOINTMENT (OUTPATIENT)
Dept: ORTHOPEDIC SURGERY | Facility: CLINIC | Age: 75
End: 2025-08-07
Payer: MEDICARE

## 2025-08-14 ENCOUNTER — HOSPITAL ENCOUNTER (OUTPATIENT)
Dept: RADIOLOGY | Facility: CLINIC | Age: 75
Discharge: HOME | End: 2025-08-14
Payer: MEDICARE

## 2025-08-14 ENCOUNTER — OFFICE VISIT (OUTPATIENT)
Dept: ORTHOPEDIC SURGERY | Facility: CLINIC | Age: 75
End: 2025-08-14
Payer: MEDICARE

## 2025-08-14 DIAGNOSIS — S32.401A CLOSED DISPLACED FRACTURE OF RIGHT ACETABULUM, UNSPECIFIED PORTION OF ACETABULUM, INITIAL ENCOUNTER (MULTI): ICD-10-CM

## 2025-08-14 DIAGNOSIS — M54.31 SCIATIC NERVE PAIN, RIGHT: ICD-10-CM

## 2025-08-14 PROCEDURE — 99215 OFFICE O/P EST HI 40 MIN: CPT | Performed by: ORTHOPAEDIC SURGERY

## 2025-08-14 PROCEDURE — 72170 X-RAY EXAM OF PELVIS: CPT | Performed by: RADIOLOGY

## 2025-08-14 PROCEDURE — 72190 X-RAY EXAM OF PELVIS: CPT

## 2025-08-14 PROCEDURE — 99212 OFFICE O/P EST SF 10 MIN: CPT | Performed by: ORTHOPAEDIC SURGERY

## 2025-08-15 ENCOUNTER — HOSPITAL ENCOUNTER (OUTPATIENT)
Dept: RADIOLOGY | Facility: CLINIC | Age: 75
End: 2025-08-15
Payer: MEDICARE

## 2025-08-19 ENCOUNTER — HOSPITAL ENCOUNTER (OUTPATIENT)
Dept: RADIOLOGY | Facility: HOSPITAL | Age: 75
Discharge: HOME | End: 2025-08-19
Payer: MEDICARE

## 2025-08-19 DIAGNOSIS — S32.401A CLOSED DISPLACED FRACTURE OF RIGHT ACETABULUM, UNSPECIFIED PORTION OF ACETABULUM, INITIAL ENCOUNTER (MULTI): ICD-10-CM

## 2025-08-19 DIAGNOSIS — M54.31 SCIATIC NERVE PAIN, RIGHT: ICD-10-CM

## 2025-08-19 PROCEDURE — 72192 CT PELVIS W/O DYE: CPT

## 2025-08-19 PROCEDURE — 72192 CT PELVIS W/O DYE: CPT | Performed by: STUDENT IN AN ORGANIZED HEALTH CARE EDUCATION/TRAINING PROGRAM

## 2025-08-21 ENCOUNTER — HOSPITAL ENCOUNTER (OUTPATIENT)
Dept: NEUROLOGY | Facility: CLINIC | Age: 75
Discharge: HOME | End: 2025-08-21
Payer: MEDICARE

## 2025-08-21 DIAGNOSIS — S32.401A CLOSED DISPLACED FRACTURE OF RIGHT ACETABULUM, UNSPECIFIED PORTION OF ACETABULUM, INITIAL ENCOUNTER (MULTI): ICD-10-CM

## 2025-08-21 DIAGNOSIS — M54.31 SCIATIC NERVE PAIN, RIGHT: ICD-10-CM

## 2025-08-21 PROCEDURE — 95911 NRV CNDJ TEST 9-10 STUDIES: CPT | Performed by: PSYCHIATRY & NEUROLOGY

## 2025-08-21 PROCEDURE — 95886 MUSC TEST DONE W/N TEST COMP: CPT | Performed by: PSYCHIATRY & NEUROLOGY

## 2025-08-28 ENCOUNTER — OFFICE VISIT (OUTPATIENT)
Dept: ORTHOPEDIC SURGERY | Facility: CLINIC | Age: 75
End: 2025-08-28
Payer: MEDICARE

## 2025-08-28 DIAGNOSIS — M12.551 TRAUMATIC ARTHRITIS OF RIGHT HIP: ICD-10-CM

## 2025-08-28 DIAGNOSIS — M54.31 SCIATIC NERVE PAIN, RIGHT: ICD-10-CM

## 2025-08-28 DIAGNOSIS — S32.401A CLOSED DISPLACED FRACTURE OF RIGHT ACETABULUM, UNSPECIFIED PORTION OF ACETABULUM, INITIAL ENCOUNTER (MULTI): Primary | ICD-10-CM

## 2025-08-28 PROCEDURE — 99214 OFFICE O/P EST MOD 30 MIN: CPT | Performed by: ORTHOPAEDIC SURGERY

## 2025-08-28 PROCEDURE — 99212 OFFICE O/P EST SF 10 MIN: CPT

## 2025-11-18 ENCOUNTER — APPOINTMENT (OUTPATIENT)
Dept: PRIMARY CARE | Facility: CLINIC | Age: 75
End: 2025-11-18
Payer: MEDICARE

## 2026-05-18 ENCOUNTER — APPOINTMENT (OUTPATIENT)
Dept: PRIMARY CARE | Facility: CLINIC | Age: 76
End: 2026-05-18
Payer: MEDICARE

## 2026-06-17 ENCOUNTER — APPOINTMENT (OUTPATIENT)
Dept: RHEUMATOLOGY | Facility: CLINIC | Age: 76
End: 2026-06-17
Payer: MEDICARE

## (undated) DEVICE — SUTURE, VICRYL, 2-0, 27 IN, FSL, UNDYED

## (undated) DEVICE — GOWN, ASTOUND, XL

## (undated) DEVICE — COVER, CART, 45 X 27 X 48 IN, CLEAR

## (undated) DEVICE — TOWEL, SURGICAL, NEURO, O/R, 16 X 26, BLUE, STERILE

## (undated) DEVICE — Device

## (undated) DEVICE — SUTURE, VICRYL, 1, 27 IN, CT-1, VIOLET

## (undated) DEVICE — STAPLER, SKIN PROXIMATE, 35 WIDE

## (undated) DEVICE — TIP, SUCTION, YANKAUER, FLEXIBLE

## (undated) DEVICE — DRESSING, MEPILEX BORDER, POST-OP AG, 4 X 10 IN

## (undated) DEVICE — DRAPE COVER, C ARM, FLOUROSCAN IMAGING SYS

## (undated) DEVICE — DRAPE, PAD, PREP, W/ 9 IN CUFF, 24 X 41, LF, NS

## (undated) DEVICE — TUBING, SUCTION, CONNECTING, STERILE 0.25 X 120 IN., LF

## (undated) DEVICE — NEEDLE, SAFETY, 18 G X 1.5 IN

## (undated) DEVICE — SPONGE, LAP, XRAY DECT, 18IN X 18IN, W/LOOP, STERILE

## (undated) DEVICE — CLAMP, DRAPE/TUBE, DISPOSABLE, NS

## (undated) DEVICE — DRILL, 2.5MM 180MM/155MM QC

## (undated) DEVICE — MARKER, SKIN, RULER AND LABEL PACK, CUSTOM

## (undated) DEVICE — PREP TRAY, SKIN, DRY, W/GLOVES

## (undated) DEVICE — MAYO TRAY, LARGE

## (undated) DEVICE — COVER, C-ARM W/CLIPS, OEC GE

## (undated) DEVICE — DRAPE, LARGE TOWEL, NON- FENESTRATED, 17X23, CLEAR, N/S

## (undated) DEVICE — STOCKINETTE, IMPERVIOUS, 12 X 48 IN, LF, STERILE

## (undated) DEVICE — APPLICATOR, CHLORAPREP, W/ORANGE TINT, 26ML

## (undated) DEVICE — COVER, BACK TABLE, 65 X 90, HVY REINFORCED

## (undated) DEVICE — CLIPPER, SURGICAL BLADE ASSEMBLY, GENERAL PURPOSE, SINGLE USE

## (undated) DEVICE — WAX, BONE, 2.5 GM

## (undated) DEVICE — ELECTRODE, ELECTROSURGICAL, BLADE, EXTENDED, 6.5 IN, STAINLESS STEEL

## (undated) DEVICE — SYRINGE, 35 CC, LUER LOCK, MONOJECT, W/O CAP, LF

## (undated) DEVICE — HOOD, SURGICAL, FLYTE HYBRID

## (undated) DEVICE — BOLSTER, HIP

## (undated) DEVICE — MANIFOLD, 4 PORT NEPTUNE STANDARD

## (undated) DEVICE — DRESSING, NON-ADHERENT, OIL EMULSION, CURITY, 3 X 8 IN, STERILE

## (undated) DEVICE — DRAPE, INCISE, ANTIMICROBIAL, IOBAN 2, STERI DRAPE, 23 X 33 IN, DISPOSABLE, STERILE

## (undated) DEVICE — STRIP, SKIN CLOSURE, STERI STRIP, REINFORCED, 0.5 X 4 IN

## (undated) DEVICE — SUTURE, MONOCRYL, 4-0, 18 IN, PS2, UNDYED

## (undated) DEVICE — DRAPE, INCISE, ANTIMICROBIAL, IOBAN 2, LARGE, 17 X 23 IN, DISPOSABLE, STERILE

## (undated) DEVICE — DRAPE, SHEET, THREE QUARTER, FAN FOLD, 57 X 77 IN

## (undated) DEVICE — SUTURE, ETHIBOND EXCEL 1, TAPER POINT CT-1 GREEN 30 INCH

## (undated) DEVICE — DRAPE, TOWEL, STERI DRAPE, 17 X 11 IN, PLASTIC, STERILE

## (undated) DEVICE — PROTECTOR, NERVE, ULNAR, PINK

## (undated) DEVICE — DRAPE, SHEET, U, STERI DRAPE, 47 X 51 IN, DISPOSABLE, STERILE

## (undated) DEVICE — BANDAGE, COFLEX, 6 X 5 YDS, FOAM TAN, STERILE, LF

## (undated) DEVICE — GOWN, SURGICAL, SMARTGOWN, XLARGE, STERILE

## (undated) DEVICE — TIP, SUCTION, FRAZIER, W/CONTROL VENT, 12 FR